# Patient Record
Sex: FEMALE | Race: WHITE | NOT HISPANIC OR LATINO | Employment: OTHER | URBAN - METROPOLITAN AREA
[De-identification: names, ages, dates, MRNs, and addresses within clinical notes are randomized per-mention and may not be internally consistent; named-entity substitution may affect disease eponyms.]

---

## 2017-03-03 ENCOUNTER — HOSPITAL ENCOUNTER (OUTPATIENT)
Dept: GENERAL RADIOLOGY | Age: 62
Discharge: HOME | End: 2017-03-03
Attending: PAIN MEDICINE

## 2017-03-03 ENCOUNTER — HOSPITAL ENCOUNTER (OUTPATIENT)
Dept: PAIN MANAGEMENT | Age: 62
Discharge: HOME | End: 2017-03-03
Attending: PAIN MEDICINE

## 2017-03-03 VITALS
OXYGEN SATURATION: 99 % | RESPIRATION RATE: 16 BRPM | SYSTOLIC BLOOD PRESSURE: 144 MMHG | HEART RATE: 67 BPM | DIASTOLIC BLOOD PRESSURE: 68 MMHG

## 2017-03-03 DIAGNOSIS — M47.812 CERVICAL SPONDYLOSIS WITHOUT MYELOPATHY: Primary | ICD-10-CM

## 2017-03-03 DIAGNOSIS — M54.2 CERVICALGIA: ICD-10-CM

## 2017-03-03 PROCEDURE — 10002805 HB CONTRAST AGENT: Performed by: PAIN MEDICINE

## 2017-03-03 PROCEDURE — 10004187 HB COUNTER-VISIT CENSUS, OUTPATIENT

## 2017-03-03 PROCEDURE — 10005281 FL PACS RECORD NR

## 2017-03-03 PROCEDURE — 10002800 HB RX 250 W HCPCS: Performed by: PAIN MEDICINE

## 2017-03-03 PROCEDURE — 10000258 HB EPIDURAL INJ 1ST LEVEL W/ IMAGING

## 2017-03-03 RX ORDER — IOPAMIDOL 408 MG/ML
3 INJECTION, SOLUTION INTRATHECAL ONCE
Status: COMPLETED | OUTPATIENT
Start: 2017-03-03 | End: 2017-03-03

## 2017-03-03 RX ORDER — DEXAMETHASONE SODIUM PHOSPHATE 10 MG/ML
20 INJECTION, SOLUTION INTRAMUSCULAR; INTRAVENOUS ONCE
Status: COMPLETED | OUTPATIENT
Start: 2017-03-03 | End: 2017-03-03

## 2017-03-03 RX ADMIN — IOPAMIDOL 3 ML: 408 INJECTION, SOLUTION INTRATHECAL at 08:50

## 2017-03-03 RX ADMIN — DEXAMETHASONE SODIUM PHOSPHATE 20 MG: 10 INJECTION, SOLUTION INTRAMUSCULAR; INTRAVENOUS at 08:50

## 2017-03-03 ASSESSMENT — PAIN SCALES - GENERAL
PAIN_LEVEL_WITH_ACTIVITY: 10
PAIN_LEVEL_AT_REST: 10

## 2017-03-27 ENCOUNTER — HOSPITAL ENCOUNTER (OUTPATIENT)
Dept: GENERAL RADIOLOGY | Age: 62
Discharge: HOME | End: 2017-03-27
Attending: PAIN MEDICINE

## 2017-03-27 ENCOUNTER — HOSPITAL ENCOUNTER (OUTPATIENT)
Dept: PAIN MANAGEMENT | Age: 62
Discharge: HOME | End: 2017-03-27
Attending: PAIN MEDICINE

## 2017-03-27 VITALS
RESPIRATION RATE: 16 BRPM | OXYGEN SATURATION: 100 % | SYSTOLIC BLOOD PRESSURE: 112 MMHG | DIASTOLIC BLOOD PRESSURE: 58 MMHG | HEART RATE: 69 BPM

## 2017-03-27 DIAGNOSIS — M47.812 CERVICAL SPONDYLOSIS WITHOUT MYELOPATHY: Primary | ICD-10-CM

## 2017-03-27 DIAGNOSIS — M54.2 CERVICALGIA: ICD-10-CM

## 2017-03-27 PROCEDURE — 10005281 FL PACS RECORD NR

## 2017-03-27 PROCEDURE — 10002800 HB RX 250 W HCPCS: Performed by: PAIN MEDICINE

## 2017-03-27 PROCEDURE — 10004187 HB COUNTER-VISIT CENSUS, OUTPATIENT

## 2017-03-27 PROCEDURE — 10000258 HB EPIDURAL INJ 1ST LEVEL W/ IMAGING

## 2017-03-27 PROCEDURE — 10002805 HB CONTRAST AGENT: Performed by: PAIN MEDICINE

## 2017-03-27 RX ORDER — DEXAMETHASONE SODIUM PHOSPHATE 10 MG/ML
20 INJECTION, SOLUTION INTRAMUSCULAR; INTRAVENOUS ONCE
Status: COMPLETED | OUTPATIENT
Start: 2017-03-27 | End: 2017-03-27

## 2017-03-27 RX ORDER — IOPAMIDOL 408 MG/ML
3 INJECTION, SOLUTION INTRATHECAL ONCE
Status: COMPLETED | OUTPATIENT
Start: 2017-03-27 | End: 2017-03-27

## 2017-03-27 RX ADMIN — DEXAMETHASONE SODIUM PHOSPHATE 20 MG: 10 INJECTION, SOLUTION INTRAMUSCULAR; INTRAVENOUS at 09:38

## 2017-03-27 RX ADMIN — IOPAMIDOL 3 ML: 408 INJECTION, SOLUTION INTRATHECAL at 09:38

## 2017-03-27 ASSESSMENT — PAIN SCALES - GENERAL
PAIN_LEVEL_AT_REST: 4
PAIN_LEVEL_WITH_ACTIVITY: 10

## 2017-04-17 ENCOUNTER — HOSPITAL ENCOUNTER (OUTPATIENT)
Dept: PAIN MANAGEMENT | Age: 62
Discharge: HOME | End: 2017-04-17
Attending: PAIN MEDICINE

## 2017-04-17 ENCOUNTER — HOSPITAL ENCOUNTER (OUTPATIENT)
Dept: GENERAL RADIOLOGY | Age: 62
Discharge: HOME | End: 2017-04-17
Attending: PAIN MEDICINE

## 2017-04-17 VITALS
SYSTOLIC BLOOD PRESSURE: 142 MMHG | DIASTOLIC BLOOD PRESSURE: 59 MMHG | OXYGEN SATURATION: 100 % | HEART RATE: 76 BPM | RESPIRATION RATE: 16 BRPM

## 2017-04-17 DIAGNOSIS — M47.812 CERVICAL SPONDYLOSIS WITHOUT MYELOPATHY: Primary | ICD-10-CM

## 2017-04-17 DIAGNOSIS — M54.12 RADICULOPATHY, CERVICAL: ICD-10-CM

## 2017-04-17 DIAGNOSIS — M54.2 CERVICALGIA: ICD-10-CM

## 2017-04-17 PROCEDURE — 10005281 FL PACS RECORD NR

## 2017-04-17 PROCEDURE — 10002805 HB CONTRAST AGENT: Performed by: PAIN MEDICINE

## 2017-04-17 PROCEDURE — 10004187 HB COUNTER-VISIT CENSUS, OUTPATIENT

## 2017-04-17 PROCEDURE — 10002800 HB RX 250 W HCPCS: Performed by: PAIN MEDICINE

## 2017-04-17 PROCEDURE — 10000258 HB EPIDURAL INJ 1ST LEVEL W/ IMAGING

## 2017-04-17 RX ORDER — DEXAMETHASONE SODIUM PHOSPHATE 10 MG/ML
20 INJECTION, SOLUTION INTRAMUSCULAR; INTRAVENOUS ONCE
Status: COMPLETED | OUTPATIENT
Start: 2017-04-17 | End: 2017-04-17

## 2017-04-17 RX ORDER — IOPAMIDOL 408 MG/ML
3 INJECTION, SOLUTION INTRATHECAL ONCE
Status: COMPLETED | OUTPATIENT
Start: 2017-04-17 | End: 2017-04-17

## 2017-04-17 RX ADMIN — DEXAMETHASONE SODIUM PHOSPHATE 20 MG: 10 INJECTION, SOLUTION INTRAMUSCULAR; INTRAVENOUS at 09:15

## 2017-04-17 RX ADMIN — IOPAMIDOL 3 ML: 408 INJECTION, SOLUTION INTRATHECAL at 09:16

## 2017-04-17 ASSESSMENT — PAIN SCALES - GENERAL
PAIN_LEVEL_WITH_ACTIVITY: 10
PAIN_LEVEL_AT_REST: 10

## 2018-04-24 ENCOUNTER — HOSPITAL ENCOUNTER (OUTPATIENT)
Dept: PAIN MANAGEMENT | Age: 63
Discharge: HOME | End: 2018-04-24
Attending: ANESTHESIOLOGY

## 2018-04-24 ENCOUNTER — HOSPITAL ENCOUNTER (OUTPATIENT)
Dept: GENERAL RADIOLOGY | Age: 63
Discharge: HOME | End: 2018-04-24
Attending: PAIN MEDICINE

## 2018-04-24 VITALS
RESPIRATION RATE: 16 BRPM | DIASTOLIC BLOOD PRESSURE: 58 MMHG | OXYGEN SATURATION: 96 % | HEART RATE: 77 BPM | SYSTOLIC BLOOD PRESSURE: 149 MMHG

## 2018-04-24 DIAGNOSIS — M54.12 RADICULOPATHY, CERVICAL: ICD-10-CM

## 2018-04-24 DIAGNOSIS — M47.812 CERVICAL SPONDYLOSIS WITHOUT MYELOPATHY: Primary | ICD-10-CM

## 2018-04-24 DIAGNOSIS — M54.2 CERVICALGIA: ICD-10-CM

## 2018-04-24 PROCEDURE — 10000258 HB EPIDURAL INJ 1ST LEVEL W/ IMAGING

## 2018-04-24 PROCEDURE — 10002805 HB CONTRAST AGENT: Performed by: PAIN MEDICINE

## 2018-04-24 PROCEDURE — 10002800 HB RX 250 W HCPCS: Performed by: PAIN MEDICINE

## 2018-04-24 PROCEDURE — 10005281 FL PACS RECORD NR

## 2018-04-24 PROCEDURE — 10004187 HB COUNTER-VISIT CENSUS, OUTPATIENT

## 2018-04-24 RX ORDER — DEXAMETHASONE SODIUM PHOSPHATE 10 MG/ML
20 INJECTION, SOLUTION INTRAMUSCULAR; INTRAVENOUS ONCE
Status: COMPLETED | OUTPATIENT
Start: 2018-04-24 | End: 2018-04-24

## 2018-04-24 RX ORDER — IOPAMIDOL 408 MG/ML
3 INJECTION, SOLUTION INTRATHECAL ONCE
Status: COMPLETED | OUTPATIENT
Start: 2018-04-24 | End: 2018-04-24

## 2018-04-24 RX ADMIN — DEXAMETHASONE SODIUM PHOSPHATE 20 MG: 10 INJECTION, SOLUTION INTRAMUSCULAR; INTRAVENOUS at 08:25

## 2018-04-24 RX ADMIN — IOPAMIDOL 3 ML: 408 INJECTION, SOLUTION INTRATHECAL at 08:25

## 2018-04-24 ASSESSMENT — PAIN SCALES - GENERAL
PAIN_LEVEL_WITH_ACTIVITY: 7
PAIN_LEVEL_AT_REST: 3

## 2018-04-27 ENCOUNTER — TELEPHONE (OUTPATIENT)
Dept: PAIN MANAGEMENT | Age: 63
End: 2018-04-27

## 2018-05-11 ENCOUNTER — HOSPITAL ENCOUNTER (OUTPATIENT)
Dept: PAIN MANAGEMENT | Age: 63
Discharge: HOME | End: 2018-05-11
Attending: ANESTHESIOLOGY

## 2018-05-11 ENCOUNTER — HOSPITAL ENCOUNTER (OUTPATIENT)
Dept: GENERAL RADIOLOGY | Age: 63
Discharge: HOME | End: 2018-05-11
Attending: PAIN MEDICINE

## 2018-05-11 VITALS
OXYGEN SATURATION: 100 % | SYSTOLIC BLOOD PRESSURE: 113 MMHG | DIASTOLIC BLOOD PRESSURE: 63 MMHG | HEART RATE: 71 BPM | RESPIRATION RATE: 16 BRPM

## 2018-05-11 DIAGNOSIS — M51.36 DDD (DEGENERATIVE DISC DISEASE), LUMBAR: Primary | ICD-10-CM

## 2018-05-11 DIAGNOSIS — M54.16 LUMBAR RADICULOPATHY: ICD-10-CM

## 2018-05-11 PROBLEM — M51.369 DDD (DEGENERATIVE DISC DISEASE), LUMBAR: Status: ACTIVE | Noted: 2018-05-11

## 2018-05-11 PROCEDURE — 10004187 HB COUNTER-VISIT CENSUS, OUTPATIENT

## 2018-05-11 PROCEDURE — 10002800 HB RX 250 W HCPCS: Performed by: PAIN MEDICINE

## 2018-05-11 PROCEDURE — 10005268 HB INJECT FORAMEN ADDL BILATERAL

## 2018-05-11 PROCEDURE — 10002805 HB CONTRAST AGENT: Performed by: PAIN MEDICINE

## 2018-05-11 PROCEDURE — 10005267 HB INJECT FORAMEN INITIAL BILATERAL

## 2018-05-11 PROCEDURE — 10005281 FL PACS RECORD NR

## 2018-05-11 RX ORDER — IOPAMIDOL 408 MG/ML
3 INJECTION, SOLUTION INTRATHECAL ONCE
Status: COMPLETED | OUTPATIENT
Start: 2018-05-11 | End: 2018-05-11

## 2018-05-11 RX ORDER — BETAMETHASONE SODIUM PHOSPHATE AND BETAMETHASONE ACETATE 3; 3 MG/ML; MG/ML
12 INJECTION, SUSPENSION INTRA-ARTICULAR; INTRALESIONAL; INTRAMUSCULAR; SOFT TISSUE ONCE
Status: COMPLETED | OUTPATIENT
Start: 2018-05-11 | End: 2018-05-11

## 2018-05-11 RX ADMIN — BETAMETHASONE SODIUM PHOSPHATE AND BETAMETHASONE ACETATE 12 MG: 3; 3 INJECTION, SUSPENSION INTRA-ARTICULAR; INTRALESIONAL; INTRAMUSCULAR at 09:08

## 2018-05-11 RX ADMIN — IOPAMIDOL 3 ML: 408 INJECTION, SOLUTION INTRATHECAL at 09:08

## 2018-05-11 ASSESSMENT — PAIN SCALES - GENERAL
PAIN_LEVEL_WITH_ACTIVITY: 10
PAIN_LEVEL_AT_REST: 3

## 2018-05-31 ENCOUNTER — HOSPITAL ENCOUNTER (OUTPATIENT)
Dept: GENERAL RADIOLOGY | Age: 63
Discharge: HOME | End: 2018-05-31
Attending: PAIN MEDICINE

## 2018-05-31 ENCOUNTER — HOSPITAL ENCOUNTER (OUTPATIENT)
Dept: PAIN MANAGEMENT | Age: 63
Discharge: HOME | End: 2018-05-31
Attending: ANESTHESIOLOGY

## 2018-05-31 VITALS
RESPIRATION RATE: 16 BRPM | SYSTOLIC BLOOD PRESSURE: 100 MMHG | HEART RATE: 75 BPM | OXYGEN SATURATION: 100 % | DIASTOLIC BLOOD PRESSURE: 59 MMHG

## 2018-05-31 DIAGNOSIS — M51.36 DDD (DEGENERATIVE DISC DISEASE), LUMBAR: Primary | ICD-10-CM

## 2018-05-31 DIAGNOSIS — M54.16 LUMBAR RADICULOPATHY: ICD-10-CM

## 2018-05-31 PROCEDURE — 10005267 HB INJECT FORAMEN INITIAL BILATERAL

## 2018-05-31 PROCEDURE — 10002805 HB CONTRAST AGENT: Performed by: PAIN MEDICINE

## 2018-05-31 PROCEDURE — 10004187 HB COUNTER-VISIT CENSUS, OUTPATIENT

## 2018-05-31 PROCEDURE — 10002800 HB RX 250 W HCPCS: Performed by: PAIN MEDICINE

## 2018-05-31 PROCEDURE — 10005268 HB INJECT FORAMEN ADDL BILATERAL

## 2018-05-31 PROCEDURE — 10005281 FL PACS RECORD NR

## 2018-05-31 RX ORDER — IOPAMIDOL 408 MG/ML
3 INJECTION, SOLUTION INTRATHECAL ONCE
Status: COMPLETED | OUTPATIENT
Start: 2018-05-31 | End: 2018-05-31

## 2018-05-31 RX ORDER — BETAMETHASONE SODIUM PHOSPHATE AND BETAMETHASONE ACETATE 3; 3 MG/ML; MG/ML
12 INJECTION, SUSPENSION INTRA-ARTICULAR; INTRALESIONAL; INTRAMUSCULAR; SOFT TISSUE ONCE
Status: COMPLETED | OUTPATIENT
Start: 2018-05-31 | End: 2018-05-31

## 2018-05-31 RX ADMIN — BETAMETHASONE SODIUM PHOSPHATE AND BETAMETHASONE ACETATE 12 MG: 3; 3 INJECTION, SUSPENSION INTRA-ARTICULAR; INTRALESIONAL; INTRAMUSCULAR at 11:42

## 2018-05-31 RX ADMIN — IOPAMIDOL 3 ML: 408 INJECTION, SOLUTION INTRATHECAL at 11:42

## 2018-05-31 ASSESSMENT — PAIN SCALES - GENERAL
PAIN_LEVEL_AT_REST: 3
PAIN_LEVEL_WITH_ACTIVITY: 10

## 2018-12-03 ENCOUNTER — HOSPITAL ENCOUNTER (OUTPATIENT)
Dept: MAMMOGRAPHY | Age: 63
Discharge: HOME | End: 2018-12-03
Attending: FAMILY MEDICINE

## 2018-12-03 DIAGNOSIS — Z12.39 SCREENING BREAST EXAMINATION: ICD-10-CM

## 2018-12-03 PROCEDURE — 77063 BREAST TOMOSYNTHESIS BI: CPT | Performed by: RADIOLOGY

## 2018-12-03 PROCEDURE — 3342F MAMMO ASSESS BENGN DOCD: CPT | Performed by: RADIOLOGY

## 2018-12-03 PROCEDURE — 77063 BREAST TOMOSYNTHESIS BI: CPT

## 2018-12-03 PROCEDURE — 77067 SCR MAMMO BI INCL CAD: CPT | Performed by: RADIOLOGY

## 2019-02-28 DIAGNOSIS — M25.551 RIGHT HIP PAIN: Primary | ICD-10-CM

## 2019-03-05 ENCOUNTER — OFFICE VISIT (OUTPATIENT)
Dept: ORTHOPEDICS | Age: 64
End: 2019-03-05

## 2019-03-05 ENCOUNTER — IMAGING SERVICES (OUTPATIENT)
Dept: GENERAL RADIOLOGY | Age: 64
End: 2019-03-05
Attending: ORTHOPAEDIC SURGERY

## 2019-03-05 DIAGNOSIS — M16.11 PRIMARY OSTEOARTHRITIS OF RIGHT HIP: ICD-10-CM

## 2019-03-05 DIAGNOSIS — M16.11 PRIMARY OSTEOARTHRITIS OF RIGHT HIP: Primary | ICD-10-CM

## 2019-03-05 DIAGNOSIS — M25.551 RIGHT HIP PAIN: ICD-10-CM

## 2019-03-05 PROCEDURE — 99214 OFFICE O/P EST MOD 30 MIN: CPT | Performed by: ORTHOPAEDIC SURGERY

## 2019-03-05 PROCEDURE — 73501 X-RAY EXAM HIP UNI 1 VIEW: CPT | Performed by: RADIOLOGY

## 2019-03-06 ENCOUNTER — PREP FOR CASE (OUTPATIENT)
Dept: ORTHOPEDICS | Age: 64
End: 2019-03-06

## 2019-03-06 DIAGNOSIS — M16.11 PRIMARY OSTEOARTHRITIS OF RIGHT HIP: Primary | ICD-10-CM

## 2019-03-14 ENCOUNTER — OFFICE VISIT (OUTPATIENT)
Dept: FAMILY MEDICINE | Age: 64
End: 2019-03-14
Attending: ORTHOPAEDIC SURGERY

## 2019-03-14 ENCOUNTER — LAB SERVICES (OUTPATIENT)
Dept: LAB | Age: 64
End: 2019-03-14

## 2019-03-14 VITALS
DIASTOLIC BLOOD PRESSURE: 52 MMHG | HEART RATE: 74 BPM | SYSTOLIC BLOOD PRESSURE: 98 MMHG | HEIGHT: 64 IN | BODY MASS INDEX: 23.49 KG/M2 | OXYGEN SATURATION: 98 % | WEIGHT: 137.6 LBS

## 2019-03-14 DIAGNOSIS — F51.01 PRIMARY INSOMNIA: ICD-10-CM

## 2019-03-14 DIAGNOSIS — Z13.220 LIPID SCREENING: ICD-10-CM

## 2019-03-14 DIAGNOSIS — Z11.59 NEED FOR HEPATITIS C SCREENING TEST: ICD-10-CM

## 2019-03-14 DIAGNOSIS — R25.1 TREMOR: ICD-10-CM

## 2019-03-14 DIAGNOSIS — I10 ESSENTIAL HYPERTENSION: ICD-10-CM

## 2019-03-14 DIAGNOSIS — E03.8 OTHER SPECIFIED HYPOTHYROIDISM: ICD-10-CM

## 2019-03-14 DIAGNOSIS — Z76.89 ENCOUNTER TO ESTABLISH CARE: ICD-10-CM

## 2019-03-14 DIAGNOSIS — F41.9 ANXIETY AND DEPRESSION: ICD-10-CM

## 2019-03-14 DIAGNOSIS — F32.A ANXIETY AND DEPRESSION: ICD-10-CM

## 2019-03-14 DIAGNOSIS — G57.93 NEUROPATHY INVOLVING BOTH LOWER EXTREMITIES: ICD-10-CM

## 2019-03-14 DIAGNOSIS — F31.73 BIPOLAR DISORDER, IN PARTIAL REMISSION, MOST RECENT EPISODE MANIC (CMD): ICD-10-CM

## 2019-03-14 DIAGNOSIS — Z86.2 HISTORY OF IRON DEFICIENCY ANEMIA: ICD-10-CM

## 2019-03-14 DIAGNOSIS — Z01.812 PRE-PROCEDURAL LABORATORY EXAMINATION: ICD-10-CM

## 2019-03-14 DIAGNOSIS — Z72.820 SLEEP DEPRIVATION: Primary | ICD-10-CM

## 2019-03-14 PROBLEM — M79.89 LEG SWELLING: Status: ACTIVE | Noted: 2019-03-14

## 2019-03-14 PROBLEM — G47.39 OTHER SLEEP APNEA: Status: ACTIVE | Noted: 2019-03-14

## 2019-03-14 PROCEDURE — 36415 COLL VENOUS BLD VENIPUNCTURE: CPT | Performed by: INTERNAL MEDICINE

## 2019-03-14 PROCEDURE — 99204 OFFICE O/P NEW MOD 45 MIN: CPT | Performed by: FAMILY MEDICINE

## 2019-03-14 RX ORDER — PREGABALIN 100 MG/1
100 CAPSULE ORAL 2 TIMES DAILY
COMMUNITY
End: 2020-05-19 | Stop reason: SDUPTHER

## 2019-03-14 RX ORDER — SERTRALINE HYDROCHLORIDE 100 MG/1
100 TABLET, FILM COATED ORAL DAILY
COMMUNITY
End: 2020-05-19 | Stop reason: SDUPTHER

## 2019-03-14 ASSESSMENT — PATIENT HEALTH QUESTIONNAIRE - PHQ9
SUM OF ALL RESPONSES TO PHQ9 QUESTIONS 1 AND 2: 2
2. FEELING DOWN, DEPRESSED OR HOPELESS: SEVERAL DAYS
SUM OF ALL RESPONSES TO PHQ9 QUESTIONS 1 AND 2: 2
1. LITTLE INTEREST OR PLEASURE IN DOING THINGS: SEVERAL DAYS

## 2019-03-15 ENCOUNTER — TELEPHONE (OUTPATIENT)
Dept: FAMILY MEDICINE | Age: 64
End: 2019-03-15

## 2019-03-15 ENCOUNTER — TELEPHONE (OUTPATIENT)
Dept: ORTHOPEDICS | Age: 64
End: 2019-03-15

## 2019-03-15 DIAGNOSIS — D53.9 MACROCYTIC ANEMIA: Primary | ICD-10-CM

## 2019-03-15 LAB
ALBUMIN SERPL-MCNC: 3.8 G/DL (ref 3.6–5.1)
ALBUMIN/GLOB SERPL: 1.4 {RATIO} (ref 1–2.4)
ALP SERPL-CCNC: 94 UNITS/L (ref 45–117)
ALT SERPL-CCNC: 16 UNITS/L
ANION GAP SERPL CALC-SCNC: 10 MMOL/L (ref 10–20)
APPEARANCE UR: CLEAR
AST SERPL-CCNC: 12 UNITS/L
BASOPHILS # BLD AUTO: 0 K/MCL (ref 0–0.3)
BASOPHILS NFR BLD AUTO: 1 %
BILIRUB SERPL-MCNC: 0.3 MG/DL (ref 0.2–1)
BILIRUB UR QL STRIP: NEGATIVE
BUN SERPL-MCNC: 27 MG/DL (ref 6–20)
BUN/CREAT SERPL: 25 (ref 7–25)
CALCIUM SERPL-MCNC: 8.5 MG/DL (ref 8.4–10.2)
CHLORIDE SERPL-SCNC: 109 MMOL/L (ref 98–107)
CHOLEST SERPL-MCNC: 199 MG/DL
CHOLEST/HDLC SERPL: 3 {RATIO}
CO2 SERPL-SCNC: 27 MMOL/L (ref 21–32)
COLOR UR: YELLOW
CREAT SERPL-MCNC: 1.08 MG/DL (ref 0.51–0.95)
DIFFERENTIAL METHOD BLD: ABNORMAL
EOSINOPHIL # BLD AUTO: 0.1 K/MCL (ref 0.1–0.5)
EOSINOPHIL NFR SPEC: 1 %
ERYTHROCYTE [DISTWIDTH] IN BLOOD: 12.6 % (ref 11–15)
FASTING STATUS PATIENT QL REPORTED: 1 HRS
GLOBULIN SER-MCNC: 2.8 G/DL (ref 2–4)
GLUCOSE SERPL-MCNC: 82 MG/DL (ref 65–99)
GLUCOSE UR STRIP-MCNC: NEGATIVE MG/DL
HCT VFR BLD CALC: 38.4 % (ref 36–46.5)
HCV AB SER QL: NEGATIVE
HDLC SERPL-MCNC: 67 MG/DL
HGB BLD-MCNC: 11.6 G/DL (ref 12–15.5)
HGB UR QL STRIP: NEGATIVE
IMM GRANULOCYTES # BLD AUTO: 0 K/MCL (ref 0–0.2)
IMM GRANULOCYTES NFR BLD: 0 %
KETONES UR STRIP-MCNC: NEGATIVE MG/DL
LDLC SERPL-MCNC: 100 MG/DL
LENGTH OF FAST TIME PATIENT: 1 HRS
LEUKOCYTE ESTERASE UR QL STRIP: NEGATIVE
LYMPHOCYTES # BLD MANUAL: 2.1 K/MCL (ref 1–4)
LYMPHOCYTES NFR BLD MANUAL: 50 %
MCH RBC QN AUTO: 32.9 PG (ref 26–34)
MCHC RBC AUTO-ENTMCNC: 30.2 G/DL (ref 32–36.5)
MCV RBC AUTO: 108.8 FL (ref 78–100)
MONOCYTES # BLD MANUAL: 0.3 K/MCL (ref 0.3–0.9)
MONOCYTES NFR BLD MANUAL: 8 %
NEUTROPHILS # BLD: 1.7 K/MCL (ref 1.8–7.7)
NEUTROPHILS NFR BLD AUTO: 40 %
NITRITE UR QL STRIP: NEGATIVE
NONHDLC SERPL-MCNC: 132 MG/DL
NRBC BLD MANUAL-RTO: 0 /100 WBC
PH UR STRIP: 6 UNITS (ref 5–7)
PLATELET # BLD: 243 K/MCL (ref 140–450)
POTASSIUM SERPL-SCNC: 3.8 MMOL/L (ref 3.4–5.1)
PROT SERPL-MCNC: 6.6 G/DL (ref 6.4–8.2)
PROT UR STRIP-MCNC: NEGATIVE MG/DL
RBC # BLD: 3.53 MIL/MCL (ref 4–5.2)
SODIUM SERPL-SCNC: 142 MMOL/L (ref 135–145)
SP GR UR STRIP: 1.01 (ref 1–1.03)
SPECIMEN SOURCE: NORMAL
TRIGL SERPL-MCNC: 162 MG/DL
TSH SERPL-ACNC: 0.98 MCUNITS/ML (ref 0.35–5)
UROBILINOGEN UR STRIP-MCNC: 0.2 MG/DL (ref 0–1)
WBC # BLD: 4.2 K/MCL (ref 4.2–11)

## 2019-03-18 ENCOUNTER — OFFICE VISIT (OUTPATIENT)
Dept: FAMILY MEDICINE | Age: 64
End: 2019-03-18
Attending: ORTHOPAEDIC SURGERY

## 2019-03-18 ENCOUNTER — TELEPHONE (OUTPATIENT)
Dept: TELEHEALTH | Age: 64
End: 2019-03-18

## 2019-03-18 ENCOUNTER — LAB SERVICES (OUTPATIENT)
Dept: LAB | Age: 64
End: 2019-03-18

## 2019-03-18 ENCOUNTER — IMAGING SERVICES (OUTPATIENT)
Dept: GENERAL RADIOLOGY | Age: 64
End: 2019-03-18

## 2019-03-18 VITALS
SYSTOLIC BLOOD PRESSURE: 108 MMHG | BODY MASS INDEX: 23.22 KG/M2 | TEMPERATURE: 98.4 F | OXYGEN SATURATION: 99 % | HEIGHT: 64 IN | DIASTOLIC BLOOD PRESSURE: 58 MMHG | WEIGHT: 136 LBS | HEART RATE: 67 BPM

## 2019-03-18 DIAGNOSIS — Z72.820 SLEEP DEPRIVATION: ICD-10-CM

## 2019-03-18 DIAGNOSIS — D53.9 MACROCYTIC ANEMIA: ICD-10-CM

## 2019-03-18 DIAGNOSIS — Z01.818 PREOP EXAMINATION: Primary | ICD-10-CM

## 2019-03-18 DIAGNOSIS — Z01.818 PREOP EXAMINATION: ICD-10-CM

## 2019-03-18 DIAGNOSIS — I10 ESSENTIAL HYPERTENSION: ICD-10-CM

## 2019-03-18 DIAGNOSIS — M16.11 PRIMARY OSTEOARTHRITIS OF RIGHT HIP: ICD-10-CM

## 2019-03-18 DIAGNOSIS — M79.89 LEG SWELLING: ICD-10-CM

## 2019-03-18 PROBLEM — N18.30 CKD (CHRONIC KIDNEY DISEASE) STAGE 3, GFR 30-59 ML/MIN  (CMD): Status: ACTIVE | Noted: 2019-03-18

## 2019-03-18 LAB
FERRITIN SERPL-MCNC: 129 NG/ML (ref 8–252)
FOLATE SERPL-MCNC: >24 NG/ML
IRON SATN MFR SERPL: 55 % (ref 15–45)
IRON SERPL-MCNC: 148 MCG/DL (ref 50–170)
TIBC SERPL-MCNC: 267 MCG/DL (ref 250–450)
VIT B12 SERPL-MCNC: 422 PG/ML (ref 211–911)

## 2019-03-18 PROCEDURE — 36415 COLL VENOUS BLD VENIPUNCTURE: CPT | Performed by: INTERNAL MEDICINE

## 2019-03-18 PROCEDURE — 99215 OFFICE O/P EST HI 40 MIN: CPT | Performed by: FAMILY MEDICINE

## 2019-03-18 PROCEDURE — 71046 X-RAY EXAM CHEST 2 VIEWS: CPT | Performed by: RADIOLOGY

## 2019-03-18 PROCEDURE — 93000 ELECTROCARDIOGRAM COMPLETE: CPT | Performed by: FAMILY MEDICINE

## 2019-03-18 RX ORDER — MODAFINIL 200 MG/1
200 TABLET ORAL 2 TIMES DAILY
Qty: 60 TABLET | Refills: 0 | Status: SHIPPED | OUTPATIENT
Start: 2019-03-18 | End: 2020-05-19 | Stop reason: SDUPTHER

## 2019-03-18 RX ORDER — FUROSEMIDE 80 MG
80 TABLET ORAL DAILY
Qty: 30 TABLET | Refills: 0 | Status: SHIPPED | COMMUNITY
Start: 2019-03-18 | End: 2020-05-19 | Stop reason: SDUPTHER

## 2019-03-18 RX ORDER — MODAFINIL 200 MG/1
600 TABLET ORAL SEE ADMIN INSTRUCTIONS
Qty: 90 TABLET | Refills: 0 | Status: SHIPPED | COMMUNITY
Start: 2019-03-18 | End: 2019-03-18 | Stop reason: SDUPTHER

## 2019-03-18 RX ORDER — QUINAPRIL 40 MG/1
40 TABLET ORAL DAILY
Qty: 30 TABLET | Refills: 0 | Status: SHIPPED | COMMUNITY
Start: 2019-03-18 | End: 2020-05-19 | Stop reason: SDUPTHER

## 2019-03-19 ENCOUNTER — TELEPHONE (OUTPATIENT)
Dept: FAMILY MEDICINE | Age: 64
End: 2019-03-19

## 2019-03-19 DIAGNOSIS — Z86.39 HISTORY OF HYPOKALEMIA: Primary | ICD-10-CM

## 2019-03-19 LAB
MRSA DNA SPEC QL NAA+PROBE: NOT DETECTED
S AUREUS DNA SPEC QL NAA+PROBE: NOT DETECTED
SPECIMEN SOURCE: NORMAL

## 2019-03-20 ENCOUNTER — TELEPHONE (OUTPATIENT)
Dept: FAMILY MEDICINE | Age: 64
End: 2019-03-20

## 2019-03-20 RX ORDER — POTASSIUM CHLORIDE 1500 MG/1
20 TABLET, EXTENDED RELEASE ORAL DAILY
Qty: 30 TABLET | Refills: 0 | Status: SHIPPED | COMMUNITY
Start: 2019-03-20 | End: 2020-05-19 | Stop reason: SDUPTHER

## 2019-04-12 ENCOUNTER — APPOINTMENT (OUTPATIENT)
Dept: FAMILY MEDICINE | Age: 64
End: 2019-04-12

## 2019-04-15 ENCOUNTER — OFFICE VISIT (OUTPATIENT)
Dept: ORTHOPEDICS | Age: 64
End: 2019-04-15

## 2019-04-15 ENCOUNTER — OFFICE VISIT (OUTPATIENT)
Dept: FAMILY MEDICINE | Age: 64
End: 2019-04-15

## 2019-04-15 VITALS
TEMPERATURE: 99.2 F | RESPIRATION RATE: 16 BRPM | HEIGHT: 64 IN | HEART RATE: 81 BPM | OXYGEN SATURATION: 98 % | BODY MASS INDEX: 23.23 KG/M2 | WEIGHT: 136.1 LBS | SYSTOLIC BLOOD PRESSURE: 98 MMHG | DIASTOLIC BLOOD PRESSURE: 60 MMHG

## 2019-04-15 DIAGNOSIS — M16.11 PRIMARY OSTEOARTHRITIS OF RIGHT HIP: Primary | ICD-10-CM

## 2019-04-15 DIAGNOSIS — G47.39 OTHER SLEEP APNEA: ICD-10-CM

## 2019-04-15 DIAGNOSIS — I10 ESSENTIAL HYPERTENSION: Primary | ICD-10-CM

## 2019-04-15 DIAGNOSIS — M16.10 HIP ARTHRITIS: ICD-10-CM

## 2019-04-15 PROCEDURE — 99214 OFFICE O/P EST MOD 30 MIN: CPT | Performed by: FAMILY MEDICINE

## 2019-04-15 PROCEDURE — 99213 OFFICE O/P EST LOW 20 MIN: CPT | Performed by: PHYSICIAN ASSISTANT

## 2019-04-15 RX ORDER — VITAMIN E 268 MG
400 CAPSULE ORAL DAILY
COMMUNITY

## 2019-04-15 RX ORDER — IBUPROFEN 800 MG/1
2400 TABLET ORAL EVERY 6 HOURS PRN
Status: ON HOLD | COMMUNITY
End: 2019-04-20 | Stop reason: HOSPADM

## 2019-04-15 ASSESSMENT — COGNITIVE AND FUNCTIONAL STATUS - GENERAL
ARE YOU DEAF OR DO YOU HAVE SERIOUS DIFFICULTY  HEARING: NO
ARE YOU BLIND OR DO YOU HAVE SERIOUS DIFFICULTY SEEING, EVEN WHEN WEARING GLASSES: NO

## 2019-04-15 ASSESSMENT — ACTIVITIES OF DAILY LIVING (ADL)
MOBILITY_ASSIST_DEVICES: CANE;FRONT-WHEELED WALKER
SENSORY_SUPPORT_DEVICES: DENTURES
ADL_SHORT_OF_BREATH: NO
ADL_SCORE: 12
RECENT_DECLINE_ADL: NO
HISTORY OF FALLING IN THE LAST YEAR (PRIOR TO ADMISSION): NO
CHRONIC_PAIN_PRESENT: YES, CHRONIC
ADL_BEFORE_ADMISSION: INDEPENDENT
NEEDS_ASSIST: NO

## 2019-04-16 ENCOUNTER — HOSPITAL ENCOUNTER (OUTPATIENT)
Dept: REHABILITATION | Age: 64
Discharge: STILL ADMITTED OR ADMINISTRATIVE | End: 2019-04-16
Attending: ORTHOPAEDIC SURGERY

## 2019-04-16 ENCOUNTER — HOSPITAL ENCOUNTER (OUTPATIENT)
Dept: ORTHOPEDICS | Age: 64
Discharge: HOME | DRG: 470 | End: 2019-04-16

## 2019-04-16 ENCOUNTER — ANESTHESIA EVENT (OUTPATIENT)
Dept: SURGERY | Age: 64
DRG: 470 | End: 2019-04-16

## 2019-04-16 DIAGNOSIS — M16.11 PRIMARY OSTEOARTHRITIS OF RIGHT HIP: ICD-10-CM

## 2019-04-16 PROCEDURE — 97530 THERAPEUTIC ACTIVITIES: CPT | Performed by: PHYSICAL THERAPIST

## 2019-04-16 PROCEDURE — 10004173 HB COUNTER-THERAPY VISIT PT: Performed by: PHYSICAL THERAPIST

## 2019-04-16 PROCEDURE — 97110 THERAPEUTIC EXERCISES: CPT | Performed by: PHYSICAL THERAPIST

## 2019-04-16 PROCEDURE — 10004172 HB COUNTER-THERAPY VISIT OT: Performed by: OCCUPATIONAL THERAPIST

## 2019-04-16 PROCEDURE — 97530 THERAPEUTIC ACTIVITIES: CPT | Performed by: OCCUPATIONAL THERAPIST

## 2019-04-16 PROCEDURE — 97110 THERAPEUTIC EXERCISES: CPT | Performed by: OCCUPATIONAL THERAPIST

## 2019-04-16 PROCEDURE — 97165 OT EVAL LOW COMPLEX 30 MIN: CPT | Performed by: PHYSICAL THERAPIST

## 2019-04-16 PROCEDURE — 97165 OT EVAL LOW COMPLEX 30 MIN: CPT | Performed by: OCCUPATIONAL THERAPIST

## 2019-04-16 PROCEDURE — 97116 GAIT TRAINING THERAPY: CPT | Performed by: PHYSICAL THERAPIST

## 2019-04-16 PROCEDURE — 97161 PT EVAL LOW COMPLEX 20 MIN: CPT | Performed by: PHYSICAL THERAPIST

## 2019-04-17 ENCOUNTER — APPOINTMENT (OUTPATIENT)
Dept: GENERAL RADIOLOGY | Age: 64
DRG: 470 | End: 2019-04-17
Attending: ORTHOPAEDIC SURGERY

## 2019-04-17 ENCOUNTER — ANESTHESIA (OUTPATIENT)
Dept: SURGERY | Age: 64
DRG: 470 | End: 2019-04-17

## 2019-04-17 ENCOUNTER — HOSPITAL ENCOUNTER (INPATIENT)
Age: 64
LOS: 3 days | Discharge: NURSING, REHAB OR LONG-TERM CARE | DRG: 470 | End: 2019-04-20
Attending: ORTHOPAEDIC SURGERY | Admitting: ORTHOPAEDIC SURGERY

## 2019-04-17 DIAGNOSIS — M16.11 OSTEOARTHRITIS OF RIGHT HIP, UNSPECIFIED OSTEOARTHRITIS TYPE: ICD-10-CM

## 2019-04-17 DIAGNOSIS — Z51.89 ENCOUNTER FOR THERAPEUTIC PROCEDURE: Primary | ICD-10-CM

## 2019-04-17 LAB
ABO + RH BLD: NORMAL
ANION GAP SERPL CALC-SCNC: 11 MMOL/L (ref 10–20)
BLD GP AB SCN SERPL QL: NEGATIVE
BLOOD BANK CMNT PATIENT-IMP: NORMAL
BUN SERPL-MCNC: 24 MG/DL (ref 6–20)
BUN/CREAT SERPL: 17 (ref 7–25)
CALCIUM SERPL-MCNC: 8.9 MG/DL (ref 8.4–10.2)
CHLORIDE SERPL-SCNC: 105 MMOL/L (ref 98–107)
CO2 SERPL-SCNC: 26 MMOL/L (ref 21–32)
CREAT SERPL-MCNC: 1.38 MG/DL (ref 0.51–0.95)
CROSSMATCH EXPIRE: NORMAL
ERYTHROCYTE [DISTWIDTH] IN BLOOD: 12.9 % (ref 11–15)
GLUCOSE SERPL-MCNC: 67 MG/DL (ref 65–99)
HCT VFR BLD CALC: 36.8 % (ref 36–46.5)
HGB BLD-MCNC: 11.4 G/DL (ref 12–15.5)
MCH RBC QN AUTO: 32.8 PG (ref 26–34)
MCHC RBC AUTO-ENTMCNC: 31 G/DL (ref 32–36.5)
MCV RBC AUTO: 105.7 FL (ref 78–100)
NRBC BLD MANUAL-RTO: 0 /100 WBC
PLATELET # BLD: 238 K/MCL (ref 140–450)
POTASSIUM SERPL-SCNC: 4.2 MMOL/L (ref 3.4–5.1)
RBC # BLD: 3.48 MIL/MCL (ref 4–5.2)
SODIUM SERPL-SCNC: 138 MMOL/L (ref 135–145)
WBC # BLD: 4.8 K/MCL (ref 4.2–11)

## 2019-04-17 PROCEDURE — 10002803 HB RX 637: Performed by: ANESTHESIOLOGY

## 2019-04-17 PROCEDURE — 10004316 HB COUNTER-PREP

## 2019-04-17 PROCEDURE — 10002803 HB RX 637: Performed by: ORTHOPAEDIC SURGERY

## 2019-04-17 PROCEDURE — C1776 JOINT DEVICE (IMPLANTABLE): HCPCS | Performed by: ORTHOPAEDIC SURGERY

## 2019-04-17 PROCEDURE — 10004173 HB COUNTER-THERAPY VISIT PT

## 2019-04-17 PROCEDURE — 10002117 HB ADDITIONAL SURGERY TIME/30 MIN: Performed by: ORTHOPAEDIC SURGERY

## 2019-04-17 PROCEDURE — 10002359 HB ANESTH GENERAL ADD'L 1/2 HR: Performed by: ORTHOPAEDIC SURGERY

## 2019-04-17 PROCEDURE — 10002807 HB RX 258: Performed by: ANESTHESIOLOGY

## 2019-04-17 PROCEDURE — 10002801 HB RX 250 W/O HCPCS: Performed by: ANESTHESIOLOGY

## 2019-04-17 PROCEDURE — 97530 THERAPEUTIC ACTIVITIES: CPT

## 2019-04-17 PROCEDURE — 10002800 HB RX 250 W HCPCS: Performed by: ORTHOPAEDIC SURGERY

## 2019-04-17 PROCEDURE — 10000002 HB ROOM CHARGE MED SURG

## 2019-04-17 PROCEDURE — 10002800 HB RX 250 W HCPCS: Performed by: ANESTHESIOLOGY

## 2019-04-17 PROCEDURE — 10004451 HB PACU RECOVERY 1ST 30 MINUTES: Performed by: ORTHOPAEDIC SURGERY

## 2019-04-17 PROCEDURE — 85027 COMPLETE CBC AUTOMATED: CPT

## 2019-04-17 PROCEDURE — 10002803 HB RX 637: Performed by: PHYSICIAN ASSISTANT

## 2019-04-17 PROCEDURE — 10004651 HB RX, NO CHARGE ITEM: Performed by: PHYSICIAN ASSISTANT

## 2019-04-17 PROCEDURE — 10002800 HB RX 250 W HCPCS: Performed by: PHYSICIAN ASSISTANT

## 2019-04-17 PROCEDURE — 10002801 HB RX 250 W/O HCPCS: Performed by: ORTHOPAEDIC SURGERY

## 2019-04-17 PROCEDURE — 97161 PT EVAL LOW COMPLEX 20 MIN: CPT

## 2019-04-17 PROCEDURE — 27130 TOTAL HIP ARTHROPLASTY: CPT | Performed by: ORTHOPAEDIC SURGERY

## 2019-04-17 PROCEDURE — 10004452 HB PACU ADDL 30 MINUTES: Performed by: ORTHOPAEDIC SURGERY

## 2019-04-17 PROCEDURE — 27130 TOTAL HIP ARTHROPLASTY: CPT | Performed by: PHYSICIAN ASSISTANT

## 2019-04-17 PROCEDURE — 10004571 HB COUNTER-HOLDING 30 MIN: Performed by: ANESTHESIOLOGY

## 2019-04-17 PROCEDURE — 86900 BLOOD TYPING SEROLOGIC ABO: CPT

## 2019-04-17 PROCEDURE — 27130 TOTAL HIP ARTHROPLASTY: CPT | Performed by: ANESTHESIOLOGY

## 2019-04-17 PROCEDURE — 10003056 HB DISPOSABLE INSTRUMENT/SUPPLY 1: Performed by: ORTHOPAEDIC SURGERY

## 2019-04-17 PROCEDURE — 97116 GAIT TRAINING THERAPY: CPT

## 2019-04-17 PROCEDURE — 10005281 FL PACS RECORD REPORTABLE

## 2019-04-17 PROCEDURE — 0SR90JA REPLACEMENT OF RIGHT HIP JOINT WITH SYNTHETIC SUBSTITUTE, UNCEMENTED, OPEN APPROACH: ICD-10-PCS | Performed by: ORTHOPAEDIC SURGERY

## 2019-04-17 PROCEDURE — 10002222 HB HIP PROCEDURES 2: Performed by: ORTHOPAEDIC SURGERY

## 2019-04-17 PROCEDURE — 80048 BASIC METABOLIC PNL TOTAL CA: CPT

## 2019-04-17 PROCEDURE — 10002807 HB RX 258: Performed by: PHYSICIAN ASSISTANT

## 2019-04-17 PROCEDURE — 10002358 HB ANESTH GENERAL 1ST 1/2 HR: Performed by: ORTHOPAEDIC SURGERY

## 2019-04-17 DEVICE — PINNACLE HIP SOLUTIONS ALTRX POLYETHYLENE ACETABULAR LINER NEUTRAL 36MM ID 54MM OD
Type: IMPLANTABLE DEVICE | Site: HIP | Status: FUNCTIONAL
Brand: PINNACLE ALTRX

## 2019-04-17 DEVICE — MMIS - STEM FEM 105MM HIP 5 12/14 STD OFFSET TPR COLLAR: Type: IMPLANTABLE DEVICE | Site: HIP | Status: FUNCTIONAL

## 2019-04-17 DEVICE — BIOLOX DELTA CERAMIC FEMORAL HEAD +8.5 36MM DIA 12/14 TAPER
Type: IMPLANTABLE DEVICE | Site: HIP | Status: FUNCTIONAL
Brand: BIOLOX DELTA

## 2019-04-17 DEVICE — PINNACLE GRIPTION ACETABULAR SHELL SECTOR 54MM OD
Type: IMPLANTABLE DEVICE | Site: HIP | Status: FUNCTIONAL
Brand: PINNACLE GRIPTION

## 2019-04-17 RX ORDER — ONDANSETRON 2 MG/ML
4 INJECTION INTRAMUSCULAR; INTRAVENOUS 2 TIMES DAILY PRN
Status: DISCONTINUED | OUTPATIENT
Start: 2019-04-17 | End: 2019-04-17 | Stop reason: HOSPADM

## 2019-04-17 RX ORDER — TRAZODONE HYDROCHLORIDE 100 MG/1
200 TABLET ORAL NIGHTLY
Status: DISCONTINUED | OUTPATIENT
Start: 2019-04-17 | End: 2019-04-20 | Stop reason: HOSPADM

## 2019-04-17 RX ORDER — MIDAZOLAM HYDROCHLORIDE 1 MG/ML
2 INJECTION, SOLUTION INTRAMUSCULAR; INTRAVENOUS
Status: COMPLETED | OUTPATIENT
Start: 2019-04-17 | End: 2019-04-17

## 2019-04-17 RX ORDER — ALBUTEROL SULFATE 2.5 MG/3ML
2.5 SOLUTION RESPIRATORY (INHALATION)
Status: DISCONTINUED | OUTPATIENT
Start: 2019-04-17 | End: 2019-04-17 | Stop reason: HOSPADM

## 2019-04-17 RX ORDER — SODIUM CHLORIDE, SODIUM LACTATE, POTASSIUM CHLORIDE, CALCIUM CHLORIDE 600; 310; 30; 20 MG/100ML; MG/100ML; MG/100ML; MG/100ML
INJECTION, SOLUTION INTRAVENOUS CONTINUOUS
Status: DISCONTINUED | OUTPATIENT
Start: 2019-04-17 | End: 2019-04-17 | Stop reason: ALTCHOICE

## 2019-04-17 RX ORDER — LIDOCAINE AND PRILOCAINE 25; 25 MG/G; MG/G
1 CREAM TOPICAL PRN
Status: DISCONTINUED | OUTPATIENT
Start: 2019-04-17 | End: 2019-04-17 | Stop reason: HOSPADM

## 2019-04-17 RX ORDER — CEFAZOLIN SODIUM/WATER 2 G/20 ML
2000 SYRINGE (ML) INTRAVENOUS EVERY 8 HOURS SCHEDULED
Status: DISPENSED | OUTPATIENT
Start: 2019-04-17 | End: 2019-04-18

## 2019-04-17 RX ORDER — DIPHENHYDRAMINE HYDROCHLORIDE 50 MG/ML
25 INJECTION INTRAMUSCULAR; INTRAVENOUS
Status: DISCONTINUED | OUTPATIENT
Start: 2019-04-17 | End: 2019-04-17 | Stop reason: HOSPADM

## 2019-04-17 RX ORDER — TOPIRAMATE 100 MG/1
200 TABLET, FILM COATED ORAL EVERY 12 HOURS SCHEDULED
Status: DISCONTINUED | OUTPATIENT
Start: 2019-04-17 | End: 2019-04-17

## 2019-04-17 RX ORDER — OXYCODONE HYDROCHLORIDE 5 MG/1
5-15 TABLET ORAL
Status: DISCONTINUED | OUTPATIENT
Start: 2019-04-17 | End: 2019-04-20 | Stop reason: HOSPADM

## 2019-04-17 RX ORDER — FAMOTIDINE 20 MG/1
20 TABLET, FILM COATED ORAL
Status: COMPLETED | OUTPATIENT
Start: 2019-04-17 | End: 2019-04-17

## 2019-04-17 RX ORDER — DEXAMETHASONE SODIUM PHOSPHATE 4 MG/ML
4 INJECTION, SOLUTION INTRA-ARTICULAR; INTRALESIONAL; INTRAMUSCULAR; INTRAVENOUS; SOFT TISSUE
Status: DISCONTINUED | OUTPATIENT
Start: 2019-04-17 | End: 2019-04-17 | Stop reason: HOSPADM

## 2019-04-17 RX ORDER — BUPIVACAINE HYDROCHLORIDE AND EPINEPHRINE 2.5; 5 MG/ML; UG/ML
INJECTION, SOLUTION EPIDURAL; INFILTRATION; INTRACAUDAL; PERINEURAL PRN
Status: DISCONTINUED | OUTPATIENT
Start: 2019-04-17 | End: 2019-04-17 | Stop reason: HOSPADM

## 2019-04-17 RX ORDER — HYDRALAZINE HYDROCHLORIDE 20 MG/ML
5 INJECTION INTRAMUSCULAR; INTRAVENOUS EVERY 10 MIN PRN
Status: DISCONTINUED | OUTPATIENT
Start: 2019-04-17 | End: 2019-04-17 | Stop reason: HOSPADM

## 2019-04-17 RX ORDER — SODIUM CHLORIDE, SODIUM LACTATE, POTASSIUM CHLORIDE, CALCIUM CHLORIDE 600; 310; 30; 20 MG/100ML; MG/100ML; MG/100ML; MG/100ML
INJECTION, SOLUTION INTRAVENOUS CONTINUOUS PRN
Status: DISCONTINUED | OUTPATIENT
Start: 2019-04-17 | End: 2019-04-17

## 2019-04-17 RX ORDER — TOPIRAMATE 100 MG/1
400 TABLET, FILM COATED ORAL NIGHTLY
Status: DISCONTINUED | OUTPATIENT
Start: 2019-04-17 | End: 2019-04-20 | Stop reason: HOSPADM

## 2019-04-17 RX ORDER — NICOTINE POLACRILEX 4 MG
30 LOZENGE BUCCAL
Status: DISCONTINUED | OUTPATIENT
Start: 2019-04-17 | End: 2019-04-17 | Stop reason: HOSPADM

## 2019-04-17 RX ORDER — ACETAMINOPHEN 500 MG
1000 TABLET ORAL EVERY 8 HOURS SCHEDULED
Status: DISCONTINUED | OUTPATIENT
Start: 2019-04-17 | End: 2019-04-20 | Stop reason: HOSPADM

## 2019-04-17 RX ORDER — DIPHENHYDRAMINE HYDROCHLORIDE 50 MG/ML
25 INJECTION INTRAMUSCULAR; INTRAVENOUS EVERY 4 HOURS PRN
Status: DISCONTINUED | OUTPATIENT
Start: 2019-04-17 | End: 2019-04-17 | Stop reason: HOSPADM

## 2019-04-17 RX ORDER — 0.9 % SODIUM CHLORIDE 0.9 %
2 VIAL (ML) INJECTION PRN
Status: DISCONTINUED | OUTPATIENT
Start: 2019-04-17 | End: 2019-04-17 | Stop reason: HOSPADM

## 2019-04-17 RX ORDER — 0.9 % SODIUM CHLORIDE 0.9 %
2 VIAL (ML) INJECTION EVERY 12 HOURS SCHEDULED
Status: DISCONTINUED | OUTPATIENT
Start: 2019-04-17 | End: 2019-04-17 | Stop reason: HOSPADM

## 2019-04-17 RX ORDER — CELECOXIB 200 MG/1
200 CAPSULE ORAL ONCE
Status: COMPLETED | OUTPATIENT
Start: 2019-04-17 | End: 2019-04-17

## 2019-04-17 RX ORDER — CEFAZOLIN SODIUM/WATER 2 G/20 ML
2000 SYRINGE (ML) INTRAVENOUS
Status: DISCONTINUED | OUTPATIENT
Start: 2019-04-17 | End: 2019-04-17 | Stop reason: HOSPADM

## 2019-04-17 RX ORDER — DEXTROSE MONOHYDRATE 25 G/50ML
25 INJECTION, SOLUTION INTRAVENOUS PRN
Status: DISCONTINUED | OUTPATIENT
Start: 2019-04-17 | End: 2019-04-17 | Stop reason: HOSPADM

## 2019-04-17 RX ORDER — HUMAN INSULIN 100 [IU]/ML
INJECTION, SOLUTION SUBCUTANEOUS
Status: DISCONTINUED | OUTPATIENT
Start: 2019-04-17 | End: 2019-04-17 | Stop reason: HOSPADM

## 2019-04-17 RX ORDER — SODIUM CHLORIDE, SODIUM LACTATE, POTASSIUM CHLORIDE, CALCIUM CHLORIDE 600; 310; 30; 20 MG/100ML; MG/100ML; MG/100ML; MG/100ML
INJECTION, SOLUTION INTRAVENOUS CONTINUOUS
Status: DISCONTINUED | OUTPATIENT
Start: 2019-04-17 | End: 2019-04-20 | Stop reason: HOSPADM

## 2019-04-17 RX ORDER — SERTRALINE HYDROCHLORIDE 100 MG/1
100 TABLET, FILM COATED ORAL DAILY
Status: DISCONTINUED | OUTPATIENT
Start: 2019-04-18 | End: 2019-04-20 | Stop reason: HOSPADM

## 2019-04-17 RX ORDER — DIPHENHYDRAMINE HYDROCHLORIDE 50 MG/ML
12.5 INJECTION INTRAMUSCULAR; INTRAVENOUS EVERY 4 HOURS PRN
Status: DISCONTINUED | OUTPATIENT
Start: 2019-04-17 | End: 2019-04-17 | Stop reason: HOSPADM

## 2019-04-17 RX ORDER — DEXAMETHASONE SODIUM PHOSPHATE 4 MG/ML
INJECTION, SOLUTION INTRA-ARTICULAR; INTRALESIONAL; INTRAMUSCULAR; INTRAVENOUS; SOFT TISSUE PRN
Status: DISCONTINUED | OUTPATIENT
Start: 2019-04-17 | End: 2019-04-17

## 2019-04-17 RX ORDER — ACETAMINOPHEN 500 MG
1000 TABLET ORAL ONCE
Status: DISCONTINUED | OUTPATIENT
Start: 2019-04-17 | End: 2019-04-17 | Stop reason: HOSPADM

## 2019-04-17 RX ORDER — ROCURONIUM BROMIDE 10 MG/ML
INJECTION, SOLUTION INTRAVENOUS PRN
Status: DISCONTINUED | OUTPATIENT
Start: 2019-04-17 | End: 2019-04-17

## 2019-04-17 RX ORDER — SODIUM CHLORIDE 9 MG/ML
INJECTION, SOLUTION INTRAVENOUS CONTINUOUS
Status: DISCONTINUED | OUTPATIENT
Start: 2019-04-17 | End: 2019-04-17 | Stop reason: ALTCHOICE

## 2019-04-17 RX ORDER — DULOXETIN HYDROCHLORIDE 60 MG/1
60 CAPSULE, DELAYED RELEASE ORAL 2 TIMES DAILY
Status: DISCONTINUED | OUTPATIENT
Start: 2019-04-17 | End: 2019-04-20 | Stop reason: HOSPADM

## 2019-04-17 RX ORDER — METOCLOPRAMIDE HYDROCHLORIDE 5 MG/ML
5 INJECTION INTRAMUSCULAR; INTRAVENOUS EVERY 6 HOURS PRN
Status: DISCONTINUED | OUTPATIENT
Start: 2019-04-17 | End: 2019-04-17 | Stop reason: HOSPADM

## 2019-04-17 RX ORDER — DEXTROSE MONOHYDRATE 50 MG/ML
INJECTION, SOLUTION INTRAVENOUS CONTINUOUS PRN
Status: DISCONTINUED | OUTPATIENT
Start: 2019-04-17 | End: 2019-04-17

## 2019-04-17 RX ORDER — PREGABALIN 100 MG/1
100 CAPSULE ORAL 2 TIMES DAILY
Status: DISCONTINUED | OUTPATIENT
Start: 2019-04-17 | End: 2019-04-20 | Stop reason: HOSPADM

## 2019-04-17 RX ORDER — CEFAZOLIN SODIUM 1 G/3ML
INJECTION, POWDER, FOR SOLUTION INTRAMUSCULAR; INTRAVENOUS PRN
Status: DISCONTINUED | OUTPATIENT
Start: 2019-04-17 | End: 2019-04-17

## 2019-04-17 RX ORDER — LEVOTHYROXINE SODIUM 0.05 MG/1
50 TABLET ORAL
Status: DISCONTINUED | OUTPATIENT
Start: 2019-04-18 | End: 2019-04-20 | Stop reason: HOSPADM

## 2019-04-17 RX ORDER — ONDANSETRON 2 MG/ML
4 INJECTION INTRAMUSCULAR; INTRAVENOUS 2 TIMES DAILY PRN
Status: DISCONTINUED | OUTPATIENT
Start: 2019-04-17 | End: 2019-04-20 | Stop reason: HOSPADM

## 2019-04-17 RX ORDER — BUPROPION HYDROCHLORIDE 150 MG/1
450 TABLET ORAL DAILY
Status: DISCONTINUED | OUTPATIENT
Start: 2019-04-18 | End: 2019-04-20 | Stop reason: HOSPADM

## 2019-04-17 RX ORDER — ENOXAPARIN SODIUM 100 MG/ML
40 INJECTION SUBCUTANEOUS DAILY
Status: DISCONTINUED | OUTPATIENT
Start: 2019-04-18 | End: 2019-04-20 | Stop reason: HOSPADM

## 2019-04-17 RX ORDER — LIDOCAINE HYDROCHLORIDE 10 MG/ML
5-10 INJECTION, SOLUTION INFILTRATION; PERINEURAL PRN
Status: DISCONTINUED | OUTPATIENT
Start: 2019-04-17 | End: 2019-04-17 | Stop reason: HOSPADM

## 2019-04-17 RX ORDER — MUPIROCIN 20 MG/G
1 OINTMENT TOPICAL EVERY 12 HOURS SCHEDULED
Status: DISCONTINUED | OUTPATIENT
Start: 2019-04-17 | End: 2019-04-19 | Stop reason: CLARIF

## 2019-04-17 RX ORDER — MUPIROCIN 20 MG/G
1 OINTMENT TOPICAL
Status: DISCONTINUED | OUTPATIENT
Start: 2019-04-17 | End: 2019-04-17 | Stop reason: HOSPADM

## 2019-04-17 RX ORDER — SODIUM CHLORIDE, SODIUM LACTATE, POTASSIUM CHLORIDE, CALCIUM CHLORIDE 600; 310; 30; 20 MG/100ML; MG/100ML; MG/100ML; MG/100ML
INJECTION, SOLUTION INTRAVENOUS CONTINUOUS
Status: DISCONTINUED | OUTPATIENT
Start: 2019-04-17 | End: 2019-04-17

## 2019-04-17 RX ORDER — CYCLOBENZAPRINE HCL 10 MG
5 TABLET ORAL 3 TIMES DAILY PRN
Status: DISCONTINUED | OUTPATIENT
Start: 2019-04-17 | End: 2019-04-20 | Stop reason: HOSPADM

## 2019-04-17 RX ORDER — PREGABALIN 50 MG/1
50 CAPSULE ORAL ONCE
Status: DISCONTINUED | OUTPATIENT
Start: 2019-04-17 | End: 2019-04-17 | Stop reason: HOSPADM

## 2019-04-17 RX ORDER — FAMOTIDINE 10 MG/ML
20 INJECTION, SOLUTION INTRAVENOUS
Status: DISCONTINUED | OUTPATIENT
Start: 2019-04-17 | End: 2019-04-17 | Stop reason: HOSPADM

## 2019-04-17 RX ORDER — VANCOMYCIN HYDROCHLORIDE 1 G/20ML
INJECTION, POWDER, LYOPHILIZED, FOR SOLUTION INTRAVENOUS PRN
Status: DISCONTINUED | OUTPATIENT
Start: 2019-04-17 | End: 2019-04-17 | Stop reason: HOSPADM

## 2019-04-17 RX ORDER — ONDANSETRON 4 MG/1
4 TABLET, ORALLY DISINTEGRATING ORAL
Status: COMPLETED | OUTPATIENT
Start: 2019-04-17 | End: 2019-04-17

## 2019-04-17 RX ORDER — FERROUS SULFATE 325(65) MG
325 TABLET ORAL 3 TIMES DAILY
Status: DISCONTINUED | OUTPATIENT
Start: 2019-04-17 | End: 2019-04-20 | Stop reason: HOSPADM

## 2019-04-17 RX ORDER — DIPHENHYDRAMINE HCL 12.5MG/5ML
12.5 LIQUID (ML) ORAL EVERY 6 HOURS PRN
Status: DISCONTINUED | OUTPATIENT
Start: 2019-04-17 | End: 2019-04-20 | Stop reason: HOSPADM

## 2019-04-17 RX ORDER — SENNOSIDES A AND B 8.6 MG/1
1 TABLET, FILM COATED ORAL NIGHTLY
Status: DISCONTINUED | OUTPATIENT
Start: 2019-04-17 | End: 2019-04-20 | Stop reason: HOSPADM

## 2019-04-17 RX ORDER — MIDAZOLAM HYDROCHLORIDE 1 MG/ML
2 INJECTION, SOLUTION INTRAMUSCULAR; INTRAVENOUS
Status: DISCONTINUED | OUTPATIENT
Start: 2019-04-17 | End: 2019-04-17 | Stop reason: HOSPADM

## 2019-04-17 RX ORDER — BISACODYL 10 MG
10 SUPPOSITORY, RECTAL RECTAL DAILY PRN
Status: DISCONTINUED | OUTPATIENT
Start: 2019-04-17 | End: 2019-04-20 | Stop reason: HOSPADM

## 2019-04-17 RX ORDER — CALCIUM CARBONATE 500 MG/1
500-1000 TABLET, CHEWABLE ORAL EVERY 4 HOURS PRN
Status: DISCONTINUED | OUTPATIENT
Start: 2019-04-17 | End: 2019-04-20 | Stop reason: HOSPADM

## 2019-04-17 RX ORDER — PANTOPRAZOLE SODIUM 40 MG/1
40 TABLET, DELAYED RELEASE ORAL
Status: DISCONTINUED | OUTPATIENT
Start: 2019-04-18 | End: 2019-04-20 | Stop reason: HOSPADM

## 2019-04-17 RX ORDER — MODAFINIL 100 MG/1
200 TABLET ORAL 2 TIMES DAILY
Status: DISCONTINUED | OUTPATIENT
Start: 2019-04-18 | End: 2019-04-20 | Stop reason: HOSPADM

## 2019-04-17 RX ORDER — PROPOFOL 10 MG/ML
INJECTION, EMULSION INTRAVENOUS PRN
Status: DISCONTINUED | OUTPATIENT
Start: 2019-04-17 | End: 2019-04-17

## 2019-04-17 RX ORDER — LIDOCAINE HYDROCHLORIDE 20 MG/ML
INJECTION, SOLUTION INFILTRATION; PERINEURAL PRN
Status: DISCONTINUED | OUTPATIENT
Start: 2019-04-17 | End: 2019-04-17

## 2019-04-17 RX ORDER — DEXAMETHASONE SODIUM PHOSPHATE 10 MG/ML
10 INJECTION, SOLUTION INTRAMUSCULAR; INTRAVENOUS ONCE
Status: DISCONTINUED | OUTPATIENT
Start: 2019-04-18 | End: 2019-04-20 | Stop reason: HOSPADM

## 2019-04-17 RX ORDER — TOPIRAMATE 100 MG/1
200 TABLET, FILM COATED ORAL DAILY
Status: DISCONTINUED | OUTPATIENT
Start: 2019-04-18 | End: 2019-04-20 | Stop reason: HOSPADM

## 2019-04-17 RX ADMIN — TRANEXAMIC ACID 1000 MG: 100 INJECTION, SOLUTION INTRAVENOUS at 11:30

## 2019-04-17 RX ADMIN — WATER 2000 MG: 100 INJECTION, SOLUTION INTRAVENOUS at 21:34

## 2019-04-17 RX ADMIN — ONDANSETRON 4 MG: 4 TABLET, ORALLY DISINTEGRATING ORAL at 10:01

## 2019-04-17 RX ADMIN — PROPOFOL 100 MG: 10 INJECTION, EMULSION INTRAVENOUS at 10:56

## 2019-04-17 RX ADMIN — FENTANYL CITRATE 100 MCG: 50 INJECTION INTRAMUSCULAR; INTRAVENOUS at 11:47

## 2019-04-17 RX ADMIN — FENTANYL CITRATE 50 MCG: 50 INJECTION INTRAMUSCULAR; INTRAVENOUS at 10:56

## 2019-04-17 RX ADMIN — SODIUM CHLORIDE, POTASSIUM CHLORIDE, SODIUM LACTATE AND CALCIUM CHLORIDE: 600; 310; 30; 20 INJECTION, SOLUTION INTRAVENOUS at 12:45

## 2019-04-17 RX ADMIN — PREGABALIN 100 MG: 100 CAPSULE ORAL at 18:32

## 2019-04-17 RX ADMIN — HYDROMORPHONE HYDROCHLORIDE 0.2 MG: 1 INJECTION, SOLUTION INTRAMUSCULAR; INTRAVENOUS; SUBCUTANEOUS at 15:13

## 2019-04-17 RX ADMIN — SODIUM CHLORIDE, POTASSIUM CHLORIDE, SODIUM LACTATE AND CALCIUM CHLORIDE: 600; 310; 30; 20 INJECTION, SOLUTION INTRAVENOUS at 10:50

## 2019-04-17 RX ADMIN — LIDOCAINE HYDROCHLORIDE 50 MG: 20 INJECTION, SOLUTION INFILTRATION; PERINEURAL at 10:56

## 2019-04-17 RX ADMIN — DEXAMETHASONE SODIUM PHOSPHATE 10 MG: 4 INJECTION, SOLUTION INTRAMUSCULAR; INTRAVENOUS at 11:01

## 2019-04-17 RX ADMIN — ACETAMINOPHEN 1000 MG: 500 TABLET ORAL at 21:33

## 2019-04-17 RX ADMIN — OXYCODONE HYDROCHLORIDE 10 MG: 5 TABLET ORAL at 21:52

## 2019-04-17 RX ADMIN — HYDROMORPHONE HYDROCHLORIDE 0.2 MG: 1 INJECTION, SOLUTION INTRAMUSCULAR; INTRAVENOUS; SUBCUTANEOUS at 14:58

## 2019-04-17 RX ADMIN — Medication 325 MG: at 21:34

## 2019-04-17 RX ADMIN — DULOXETINE HYDROCHLORIDE 60 MG: 60 CAPSULE, DELAYED RELEASE ORAL at 21:35

## 2019-04-17 RX ADMIN — FENTANYL CITRATE 50 MCG: 50 INJECTION INTRAMUSCULAR; INTRAVENOUS at 11:23

## 2019-04-17 RX ADMIN — OXYCODONE HYDROCHLORIDE 5 MG: 5 TABLET ORAL at 18:33

## 2019-04-17 RX ADMIN — TOPIRAMATE 400 MG: 100 TABLET, FILM COATED ORAL at 21:35

## 2019-04-17 RX ADMIN — ROCURONIUM BROMIDE 30 MG: 10 INJECTION INTRAVENOUS at 10:56

## 2019-04-17 RX ADMIN — SUGAMMADEX 200 MG: 100 INJECTION, SOLUTION INTRAVENOUS at 12:53

## 2019-04-17 RX ADMIN — FAMOTIDINE 20 MG: 20 TABLET, FILM COATED ORAL at 10:00

## 2019-04-17 RX ADMIN — MUPIROCIN 1 APPLICATION.: 20 OINTMENT TOPICAL at 21:33

## 2019-04-17 RX ADMIN — CELECOXIB 200 MG: 200 CAPSULE ORAL at 10:01

## 2019-04-17 RX ADMIN — CEFAZOLIN SODIUM 2 G: 1 INJECTION, POWDER, FOR SOLUTION INTRAMUSCULAR; INTRAVENOUS at 11:02

## 2019-04-17 RX ADMIN — MIDAZOLAM HYDROCHLORIDE 2 MG: 1 INJECTION, SOLUTION INTRAMUSCULAR; INTRAVENOUS at 10:40

## 2019-04-17 RX ADMIN — HYDROMORPHONE HYDROCHLORIDE 0.2 MG: 1 INJECTION, SOLUTION INTRAMUSCULAR; INTRAVENOUS; SUBCUTANEOUS at 15:27

## 2019-04-17 RX ADMIN — SENNOSIDES 8.6 MG: 8.6 TABLET ORAL at 21:33

## 2019-04-17 RX ADMIN — SODIUM CHLORIDE, POTASSIUM CHLORIDE, SODIUM LACTATE AND CALCIUM CHLORIDE: 600; 310; 30; 20 INJECTION, SOLUTION INTRAVENOUS at 09:40

## 2019-04-17 ASSESSMENT — PAIN SCALES - GENERAL
PAIN_LEVEL_AT_REST: 2
PAIN_LEVEL_AT_REST: 5
PAIN_LEVEL_WITH_ACTIVITY: 8
PAIN_LEVEL_AT_REST: 5
PAIN_LEVEL_AT_REST: 8
PAIN_LEVEL_AT_REST: 10
PAIN_LEVEL_WITH_ACTIVITY: 8
PAIN_LEVEL_AT_REST: 6
PAIN_LEVEL_AT_REST: 5
PAIN_LEVEL_AT_REST: 5
PAIN_LEVEL_AT_REST: 10

## 2019-04-17 ASSESSMENT — ACTIVITIES OF DAILY LIVING (ADL)
GROOMING: INDEPENDENT
EATING: INDEPENDENT
PRIOR_ADL: INDEPENDENT
PRIOR_ADL_BATHING: INDEPENDENT
PRIOR_ADL_TOILETING: INDEPENDENT

## 2019-04-17 ASSESSMENT — COGNITIVE AND FUNCTIONAL STATUS - GENERAL
BECAUSE OF A PHYSICAL, MENTAL, OR EMOTIONAL CONDITION, DO YOU HAVE DIFFICULTY DOING ERRANDS ALONE: YES
DO YOU HAVE SERIOUS DIFFICULTY WALKING OR CLIMBING STAIRS: YES
DO YOU HAVE DIFFICULTY DRESSING OR BATHING: YES
BECAUSE OF A PHYSICAL, MENTAL, OR EMOTIONAL CONDITION, DO YOU HAVE SERIOUS DIFFICULTY CONCENTRATING, REMEMBERING OR MAKING DECISIONS: NO

## 2019-04-18 ENCOUNTER — APPOINTMENT (OUTPATIENT)
Dept: REHABILITATION | Age: 64
End: 2019-04-18

## 2019-04-18 LAB
ANION GAP SERPL CALC-SCNC: 11 MMOL/L (ref 10–20)
BASOPHILS # BLD AUTO: 0 K/MCL (ref 0–0.3)
BASOPHILS NFR BLD AUTO: 1 %
BUN SERPL-MCNC: 25 MG/DL (ref 6–20)
BUN/CREAT SERPL: 19 (ref 7–25)
CALCIUM SERPL-MCNC: 8.4 MG/DL (ref 8.4–10.2)
CHLORIDE SERPL-SCNC: 108 MMOL/L (ref 98–107)
CO2 SERPL-SCNC: 23 MMOL/L (ref 21–32)
CREAT SERPL-MCNC: 1.32 MG/DL (ref 0.51–0.95)
DIFFERENTIAL METHOD BLD: ABNORMAL
EOSINOPHIL # BLD AUTO: 0 K/MCL (ref 0.1–0.5)
EOSINOPHIL NFR SPEC: 1 %
ERYTHROCYTE [DISTWIDTH] IN BLOOD: 12.5 % (ref 11–15)
GLUCOSE SERPL-MCNC: 94 MG/DL (ref 65–99)
HCT VFR BLD CALC: 25.1 % (ref 36–46.5)
HGB BLD-MCNC: 8 G/DL (ref 12–15.5)
IMM GRANULOCYTES # BLD AUTO: 0 K/MCL (ref 0–0.2)
IMM GRANULOCYTES NFR BLD: 0 %
LYMPHOCYTES # BLD MANUAL: 1.5 K/MCL (ref 1–4)
LYMPHOCYTES NFR BLD MANUAL: 29 %
MCH RBC QN AUTO: 33.5 PG (ref 26–34)
MCHC RBC AUTO-ENTMCNC: 31.9 G/DL (ref 32–36.5)
MCV RBC AUTO: 105 FL (ref 78–100)
MONOCYTES # BLD MANUAL: 0.6 K/MCL (ref 0.3–0.9)
MONOCYTES NFR BLD MANUAL: 11 %
NEUTROPHILS # BLD: 3.1 K/MCL (ref 1.8–7.7)
NEUTROPHILS NFR BLD AUTO: 58 %
NRBC BLD MANUAL-RTO: 0 /100 WBC
PLATELET # BLD: 162 K/MCL (ref 140–450)
POTASSIUM SERPL-SCNC: 4 MMOL/L (ref 3.4–5.1)
RBC # BLD: 2.39 MIL/MCL (ref 4–5.2)
SODIUM SERPL-SCNC: 138 MMOL/L (ref 135–145)
WBC # BLD: 5.3 K/MCL (ref 4.2–11)

## 2019-04-18 PROCEDURE — 97535 SELF CARE MNGMENT TRAINING: CPT

## 2019-04-18 PROCEDURE — 10002800 HB RX 250 W HCPCS: Performed by: PHYSICIAN ASSISTANT

## 2019-04-18 PROCEDURE — 10002803 HB RX 637: Performed by: PHYSICIAN ASSISTANT

## 2019-04-18 PROCEDURE — 10000002 HB ROOM CHARGE MED SURG

## 2019-04-18 PROCEDURE — 10002803 HB RX 637: Performed by: ORTHOPAEDIC SURGERY

## 2019-04-18 PROCEDURE — 97167 OT EVAL HIGH COMPLEX 60 MIN: CPT

## 2019-04-18 PROCEDURE — 97530 THERAPEUTIC ACTIVITIES: CPT

## 2019-04-18 PROCEDURE — 80048 BASIC METABOLIC PNL TOTAL CA: CPT

## 2019-04-18 PROCEDURE — 10004173 HB COUNTER-THERAPY VISIT PT

## 2019-04-18 PROCEDURE — 85025 COMPLETE CBC W/AUTO DIFF WBC: CPT

## 2019-04-18 PROCEDURE — 97116 GAIT TRAINING THERAPY: CPT

## 2019-04-18 PROCEDURE — 10004651 HB RX, NO CHARGE ITEM: Performed by: PHYSICIAN ASSISTANT

## 2019-04-18 PROCEDURE — 10004172 HB COUNTER-THERAPY VISIT OT

## 2019-04-18 RX ORDER — ASPIRIN 325 MG
325 TABLET ORAL DAILY
Qty: 40 TABLET | Refills: 0 | Status: SHIPPED | OUTPATIENT
Start: 2019-04-18 | End: 2019-04-19 | Stop reason: SDUPTHER

## 2019-04-18 RX ORDER — AMOXICILLIN 250 MG
1-2 CAPSULE ORAL DAILY PRN
Qty: 30 TABLET | Refills: 0 | Status: SHIPPED | OUTPATIENT
Start: 2019-04-18 | End: 2019-04-19 | Stop reason: SDUPTHER

## 2019-04-18 RX ORDER — OXYCODONE HYDROCHLORIDE 5 MG/1
5-10 TABLET ORAL EVERY 4 HOURS PRN
Qty: 50 TABLET | Refills: 0 | Status: SHIPPED | OUTPATIENT
Start: 2019-04-18 | End: 2019-04-19

## 2019-04-18 RX ADMIN — OXYCODONE HYDROCHLORIDE 10 MG: 5 TABLET ORAL at 19:30

## 2019-04-18 RX ADMIN — OXYCODONE HYDROCHLORIDE 10 MG: 5 TABLET ORAL at 13:09

## 2019-04-18 RX ADMIN — TOPIRAMATE 200 MG: 100 TABLET, FILM COATED ORAL at 08:20

## 2019-04-18 RX ADMIN — VERAPAMIL HYDROCHLORIDE 360 MG: 180 TABLET, FILM COATED, EXTENDED RELEASE ORAL at 08:27

## 2019-04-18 RX ADMIN — PREGABALIN 100 MG: 100 CAPSULE ORAL at 18:30

## 2019-04-18 RX ADMIN — ACETAMINOPHEN 1000 MG: 500 TABLET ORAL at 13:09

## 2019-04-18 RX ADMIN — SERTRALINE HYDROCHLORIDE 100 MG: 100 TABLET, FILM COATED ORAL at 08:20

## 2019-04-18 RX ADMIN — Medication 325 MG: at 08:21

## 2019-04-18 RX ADMIN — TOPIRAMATE 400 MG: 100 TABLET, FILM COATED ORAL at 22:47

## 2019-04-18 RX ADMIN — PANTOPRAZOLE SODIUM 40 MG: 40 TABLET, DELAYED RELEASE ORAL at 06:18

## 2019-04-18 RX ADMIN — OXYCODONE HYDROCHLORIDE 10 MG: 5 TABLET ORAL at 02:34

## 2019-04-18 RX ADMIN — OXYCODONE HYDROCHLORIDE 10 MG: 5 TABLET ORAL at 10:14

## 2019-04-18 RX ADMIN — DULOXETINE HYDROCHLORIDE 60 MG: 60 CAPSULE, DELAYED RELEASE ORAL at 08:21

## 2019-04-18 RX ADMIN — MODAFINIL 200 MG: 100 TABLET ORAL at 08:25

## 2019-04-18 RX ADMIN — LEVOTHYROXINE SODIUM 50 MCG: 0.05 TABLET ORAL at 06:18

## 2019-04-18 RX ADMIN — SENNOSIDES 8.6 MG: 8.6 TABLET ORAL at 22:47

## 2019-04-18 RX ADMIN — OXYCODONE HYDROCHLORIDE 10 MG: 5 TABLET ORAL at 16:13

## 2019-04-18 RX ADMIN — MODAFINIL 200 MG: 100 TABLET ORAL at 13:10

## 2019-04-18 RX ADMIN — PREGABALIN 100 MG: 100 CAPSULE ORAL at 08:21

## 2019-04-18 RX ADMIN — OXYCODONE HYDROCHLORIDE 15 MG: 5 TABLET ORAL at 22:47

## 2019-04-18 RX ADMIN — BUPROPION HYDROCHLORIDE 450 MG: 150 TABLET, FILM COATED, EXTENDED RELEASE ORAL at 08:21

## 2019-04-18 RX ADMIN — CYCLOBENZAPRINE HYDROCHLORIDE 5 MG: 10 TABLET, FILM COATED ORAL at 13:10

## 2019-04-18 RX ADMIN — Medication 325 MG: at 22:47

## 2019-04-18 RX ADMIN — OXYCODONE HYDROCHLORIDE 10 MG: 5 TABLET ORAL at 06:18

## 2019-04-18 RX ADMIN — ACETAMINOPHEN 1000 MG: 500 TABLET ORAL at 22:47

## 2019-04-18 RX ADMIN — ENOXAPARIN SODIUM 40 MG: 40 INJECTION SUBCUTANEOUS at 06:17

## 2019-04-18 RX ADMIN — Medication 325 MG: at 13:10

## 2019-04-18 RX ADMIN — ACETAMINOPHEN 1000 MG: 500 TABLET ORAL at 06:18

## 2019-04-18 RX ADMIN — CYCLOBENZAPRINE HYDROCHLORIDE 5 MG: 10 TABLET, FILM COATED ORAL at 08:20

## 2019-04-18 RX ADMIN — DULOXETINE HYDROCHLORIDE 60 MG: 60 CAPSULE, DELAYED RELEASE ORAL at 18:30

## 2019-04-18 ASSESSMENT — PAIN SCALES - GENERAL
PAIN_LEVEL_AT_REST: 8
PAIN_LEVEL_AT_REST: 8
PAIN_LEVEL_AT_REST: 10
PAIN_LEVEL_WITH_ACTIVITY: 8
PAIN_LEVEL_AT_REST: 8
PAIN_LEVEL_AT_REST: 10
PAIN_LEVEL_AT_REST: 10
PAIN_LEVEL_AT_REST: 3
PAIN_LEVEL_AT_REST: 7
PAIN_LEVEL_AT_REST: 10
PAIN_LEVEL_AT_REST: 8

## 2019-04-18 ASSESSMENT — ACTIVITIES OF DAILY LIVING (ADL)
TOILETING: NEEDS ASSISTANCE
ADL_SCORE: 8
BATHING: NEEDS ASSISTANCE
DRESSING: NEEDS ASSISTANCE
DRESSING: NEEDS ASSISTANCE
TOILETING: NEEDS ASSISTANCE
FEEDING: INDEPENDENT
ADL_SCORE: 8
BATHING: NEEDS ASSISTANCE
FEEDING: INDEPENDENT

## 2019-04-19 ENCOUNTER — APPOINTMENT (OUTPATIENT)
Dept: REHABILITATION | Age: 64
End: 2019-04-19

## 2019-04-19 LAB
ANION GAP SERPL CALC-SCNC: 11 MMOL/L (ref 10–20)
BASOPHILS # BLD AUTO: 0 K/MCL (ref 0–0.3)
BASOPHILS NFR BLD AUTO: 1 %
BUN SERPL-MCNC: 18 MG/DL (ref 6–20)
BUN/CREAT SERPL: 17 (ref 7–25)
CALCIUM SERPL-MCNC: 8.2 MG/DL (ref 8.4–10.2)
CHLORIDE SERPL-SCNC: 108 MMOL/L (ref 98–107)
CO2 SERPL-SCNC: 23 MMOL/L (ref 21–32)
CREAT SERPL-MCNC: 1.04 MG/DL (ref 0.51–0.95)
DIFFERENTIAL METHOD BLD: ABNORMAL
EOSINOPHIL # BLD AUTO: 0 K/MCL (ref 0.1–0.5)
EOSINOPHIL NFR SPEC: 1 %
ERYTHROCYTE [DISTWIDTH] IN BLOOD: 12.4 % (ref 11–15)
GLUCOSE SERPL-MCNC: 117 MG/DL (ref 65–99)
HCT VFR BLD CALC: 25.5 % (ref 36–46.5)
HGB BLD-MCNC: 8 G/DL (ref 12–15.5)
IMM GRANULOCYTES # BLD AUTO: 0 K/MCL (ref 0–0.2)
IMM GRANULOCYTES NFR BLD: 0 %
LYMPHOCYTES # BLD MANUAL: 1 K/MCL (ref 1–4)
LYMPHOCYTES NFR BLD MANUAL: 19 %
MCH RBC QN AUTO: 32.9 PG (ref 26–34)
MCHC RBC AUTO-ENTMCNC: 31.4 G/DL (ref 32–36.5)
MCV RBC AUTO: 104.9 FL (ref 78–100)
MONOCYTES # BLD MANUAL: 0.5 K/MCL (ref 0.3–0.9)
MONOCYTES NFR BLD MANUAL: 10 %
NEUTROPHILS # BLD: 3.7 K/MCL (ref 1.8–7.7)
NEUTROPHILS NFR BLD AUTO: 69 %
NRBC BLD MANUAL-RTO: 0 /100 WBC
PLATELET # BLD: 150 K/MCL (ref 140–450)
POTASSIUM SERPL-SCNC: 3.9 MMOL/L (ref 3.4–5.1)
RBC # BLD: 2.43 MIL/MCL (ref 4–5.2)
SODIUM SERPL-SCNC: 138 MMOL/L (ref 135–145)
WBC # BLD: 5.3 K/MCL (ref 4.2–11)

## 2019-04-19 PROCEDURE — 10004651 HB RX, NO CHARGE ITEM: Performed by: PHYSICIAN ASSISTANT

## 2019-04-19 PROCEDURE — 97116 GAIT TRAINING THERAPY: CPT

## 2019-04-19 PROCEDURE — 97530 THERAPEUTIC ACTIVITIES: CPT

## 2019-04-19 PROCEDURE — 10004173 HB COUNTER-THERAPY VISIT PT

## 2019-04-19 PROCEDURE — 97535 SELF CARE MNGMENT TRAINING: CPT

## 2019-04-19 PROCEDURE — 10000002 HB ROOM CHARGE MED SURG

## 2019-04-19 PROCEDURE — 10002803 HB RX 637: Performed by: PHYSICIAN ASSISTANT

## 2019-04-19 PROCEDURE — 36415 COLL VENOUS BLD VENIPUNCTURE: CPT

## 2019-04-19 PROCEDURE — 80048 BASIC METABOLIC PNL TOTAL CA: CPT

## 2019-04-19 PROCEDURE — 10002800 HB RX 250 W HCPCS: Performed by: PHYSICIAN ASSISTANT

## 2019-04-19 PROCEDURE — 10004172 HB COUNTER-THERAPY VISIT OT

## 2019-04-19 PROCEDURE — 10004325 HB COUNTER ASSESSMENT EA 15 MIN: Performed by: DIETITIAN, REGISTERED

## 2019-04-19 PROCEDURE — 85025 COMPLETE CBC W/AUTO DIFF WBC: CPT

## 2019-04-19 PROCEDURE — 10002803 HB RX 637: Performed by: ORTHOPAEDIC SURGERY

## 2019-04-19 RX ORDER — MULTIVITAMIN,THER AND MINERALS
1 TABLET ORAL DAILY
Status: DISCONTINUED | OUTPATIENT
Start: 2019-04-19 | End: 2019-04-20 | Stop reason: HOSPADM

## 2019-04-19 RX ORDER — OXYCODONE HYDROCHLORIDE 5 MG/1
5-10 TABLET ORAL EVERY 4 HOURS PRN
Qty: 50 TABLET | Refills: 0 | Status: SHIPPED | OUTPATIENT
Start: 2019-04-19 | End: 2019-04-30 | Stop reason: SDUPTHER

## 2019-04-19 RX ORDER — AMOXICILLIN 250 MG
1-2 CAPSULE ORAL DAILY PRN
Qty: 30 TABLET | Refills: 0 | Status: SHIPPED
Start: 2019-04-19 | End: 2019-10-14 | Stop reason: ALTCHOICE

## 2019-04-19 RX ORDER — POTASSIUM CHLORIDE 20 MEQ/1
20 TABLET, EXTENDED RELEASE ORAL
Status: DISCONTINUED | OUTPATIENT
Start: 2019-04-19 | End: 2019-04-20 | Stop reason: HOSPADM

## 2019-04-19 RX ORDER — FUROSEMIDE 80 MG
80 TABLET ORAL DAILY
Status: DISCONTINUED | OUTPATIENT
Start: 2019-04-19 | End: 2019-04-20 | Stop reason: HOSPADM

## 2019-04-19 RX ORDER — ASPIRIN 325 MG
325 TABLET ORAL DAILY
Qty: 40 TABLET | Refills: 0 | Status: SHIPPED
Start: 2019-04-19 | End: 2019-05-28 | Stop reason: ALTCHOICE

## 2019-04-19 RX ADMIN — MODAFINIL 200 MG: 100 TABLET ORAL at 08:10

## 2019-04-19 RX ADMIN — OXYCODONE HYDROCHLORIDE 15 MG: 5 TABLET ORAL at 15:37

## 2019-04-19 RX ADMIN — PANTOPRAZOLE SODIUM 40 MG: 40 TABLET, DELAYED RELEASE ORAL at 04:46

## 2019-04-19 RX ADMIN — Medication 325 MG: at 08:11

## 2019-04-19 RX ADMIN — Medication 325 MG: at 21:04

## 2019-04-19 RX ADMIN — DULOXETINE HYDROCHLORIDE 60 MG: 60 CAPSULE, DELAYED RELEASE ORAL at 08:12

## 2019-04-19 RX ADMIN — MODAFINIL 200 MG: 100 TABLET ORAL at 11:54

## 2019-04-19 RX ADMIN — BUPROPION HYDROCHLORIDE 450 MG: 150 TABLET, FILM COATED, EXTENDED RELEASE ORAL at 08:12

## 2019-04-19 RX ADMIN — CYCLOBENZAPRINE HYDROCHLORIDE 5 MG: 10 TABLET, FILM COATED ORAL at 08:11

## 2019-04-19 RX ADMIN — POTASSIUM CHLORIDE 20 MEQ: 1500 TABLET, EXTENDED RELEASE ORAL at 14:08

## 2019-04-19 RX ADMIN — ACETAMINOPHEN 1000 MG: 500 TABLET ORAL at 21:03

## 2019-04-19 RX ADMIN — ACETAMINOPHEN 1000 MG: 500 TABLET ORAL at 04:46

## 2019-04-19 RX ADMIN — TOPIRAMATE 200 MG: 100 TABLET, FILM COATED ORAL at 08:10

## 2019-04-19 RX ADMIN — Medication 1 TABLET: at 18:04

## 2019-04-19 RX ADMIN — ENOXAPARIN SODIUM 40 MG: 40 INJECTION SUBCUTANEOUS at 08:11

## 2019-04-19 RX ADMIN — CYCLOBENZAPRINE HYDROCHLORIDE 5 MG: 10 TABLET, FILM COATED ORAL at 21:03

## 2019-04-19 RX ADMIN — OXYCODONE HYDROCHLORIDE 15 MG: 5 TABLET ORAL at 11:54

## 2019-04-19 RX ADMIN — OXYCODONE HYDROCHLORIDE 10 MG: 5 TABLET ORAL at 08:12

## 2019-04-19 RX ADMIN — TOPIRAMATE 400 MG: 100 TABLET, FILM COATED ORAL at 21:04

## 2019-04-19 RX ADMIN — ONDANSETRON 4 MG: 2 INJECTION INTRAMUSCULAR; INTRAVENOUS at 16:48

## 2019-04-19 RX ADMIN — LEVOTHYROXINE SODIUM 50 MCG: 0.05 TABLET ORAL at 04:46

## 2019-04-19 RX ADMIN — ACETAMINOPHEN 1000 MG: 500 TABLET ORAL at 14:08

## 2019-04-19 RX ADMIN — DULOXETINE HYDROCHLORIDE 60 MG: 60 CAPSULE, DELAYED RELEASE ORAL at 18:04

## 2019-04-19 RX ADMIN — VERAPAMIL HYDROCHLORIDE 360 MG: 180 TABLET, FILM COATED, EXTENDED RELEASE ORAL at 08:10

## 2019-04-19 RX ADMIN — SENNOSIDES 8.6 MG: 8.6 TABLET ORAL at 21:04

## 2019-04-19 RX ADMIN — OXYCODONE HYDROCHLORIDE 10 MG: 5 TABLET ORAL at 04:46

## 2019-04-19 RX ADMIN — SERTRALINE HYDROCHLORIDE 100 MG: 100 TABLET, FILM COATED ORAL at 08:11

## 2019-04-19 RX ADMIN — PREGABALIN 100 MG: 100 CAPSULE ORAL at 18:07

## 2019-04-19 RX ADMIN — Medication 325 MG: at 14:08

## 2019-04-19 RX ADMIN — PREGABALIN 100 MG: 100 CAPSULE ORAL at 08:11

## 2019-04-19 RX ADMIN — FUROSEMIDE 80 MG: 80 TABLET ORAL at 14:08

## 2019-04-19 RX ADMIN — OXYCODONE HYDROCHLORIDE 15 MG: 5 TABLET ORAL at 18:43

## 2019-04-19 ASSESSMENT — ACTIVITIES OF DAILY LIVING (ADL)
BATHING: NEEDS ASSISTANCE
ADL_SCORE: 8
TOILETING: NEEDS ASSISTANCE
FEEDING: INDEPENDENT
FEEDING: INDEPENDENT
DRESSING: NEEDS ASSISTANCE
BATHING: NEEDS ASSISTANCE
ADL_SCORE: 8
DRESSING: NEEDS ASSISTANCE
TOILETING: NEEDS ASSISTANCE

## 2019-04-19 ASSESSMENT — PAIN SCALES - GENERAL
PAIN_LEVEL_AT_REST: 7
PAIN_LEVEL_AT_REST: 10
PAIN_LEVEL_AT_REST: 6
PAIN_LEVEL_AT_REST: 10
PAIN_LEVEL_AT_REST: 8
PAIN_LEVEL_AT_REST: 7
PAIN_LEVEL_AT_REST: 10
PAIN_LEVEL_AT_REST: 6
PAIN_LEVEL_AT_REST: 4
PAIN_LEVEL_WITH_ACTIVITY: 5
PAIN_LEVEL_AT_REST: 8
PAIN_LEVEL_AT_REST: 8

## 2019-04-20 VITALS
DIASTOLIC BLOOD PRESSURE: 56 MMHG | OXYGEN SATURATION: 100 % | RESPIRATION RATE: 19 BRPM | HEART RATE: 67 BPM | HEIGHT: 64 IN | SYSTOLIC BLOOD PRESSURE: 120 MMHG | BODY MASS INDEX: 22.58 KG/M2 | TEMPERATURE: 98.8 F | WEIGHT: 132.28 LBS

## 2019-04-20 PROCEDURE — 10004651 HB RX, NO CHARGE ITEM: Performed by: PHYSICIAN ASSISTANT

## 2019-04-20 PROCEDURE — 10002803 HB RX 637: Performed by: PHYSICIAN ASSISTANT

## 2019-04-20 PROCEDURE — 10002803 HB RX 637: Performed by: ORTHOPAEDIC SURGERY

## 2019-04-20 PROCEDURE — 99283 EMERGENCY DEPT VISIT LOW MDM: CPT

## 2019-04-20 PROCEDURE — 10002800 HB RX 250 W HCPCS: Performed by: PHYSICIAN ASSISTANT

## 2019-04-20 RX ADMIN — ACETAMINOPHEN 1000 MG: 500 TABLET ORAL at 06:18

## 2019-04-20 RX ADMIN — BUPROPION HYDROCHLORIDE 450 MG: 150 TABLET, FILM COATED, EXTENDED RELEASE ORAL at 08:29

## 2019-04-20 RX ADMIN — OXYCODONE HYDROCHLORIDE 15 MG: 5 TABLET ORAL at 06:18

## 2019-04-20 RX ADMIN — POTASSIUM CHLORIDE 20 MEQ: 1500 TABLET, EXTENDED RELEASE ORAL at 08:29

## 2019-04-20 RX ADMIN — FUROSEMIDE 80 MG: 80 TABLET ORAL at 08:28

## 2019-04-20 RX ADMIN — Medication 1 TABLET: at 08:28

## 2019-04-20 RX ADMIN — VERAPAMIL HYDROCHLORIDE 360 MG: 180 TABLET, FILM COATED, EXTENDED RELEASE ORAL at 08:29

## 2019-04-20 RX ADMIN — PANTOPRAZOLE SODIUM 40 MG: 40 TABLET, DELAYED RELEASE ORAL at 06:18

## 2019-04-20 RX ADMIN — DULOXETINE HYDROCHLORIDE 60 MG: 60 CAPSULE, DELAYED RELEASE ORAL at 08:29

## 2019-04-20 RX ADMIN — TOPIRAMATE 200 MG: 100 TABLET, FILM COATED ORAL at 08:29

## 2019-04-20 RX ADMIN — OXYCODONE HYDROCHLORIDE 15 MG: 5 TABLET ORAL at 12:04

## 2019-04-20 RX ADMIN — MODAFINIL 200 MG: 100 TABLET ORAL at 11:35

## 2019-04-20 RX ADMIN — LEVOTHYROXINE SODIUM 50 MCG: 0.05 TABLET ORAL at 06:18

## 2019-04-20 RX ADMIN — ENOXAPARIN SODIUM 40 MG: 40 INJECTION SUBCUTANEOUS at 08:29

## 2019-04-20 RX ADMIN — OXYCODONE HYDROCHLORIDE 15 MG: 5 TABLET ORAL at 03:02

## 2019-04-20 RX ADMIN — SERTRALINE HYDROCHLORIDE 100 MG: 100 TABLET, FILM COATED ORAL at 08:29

## 2019-04-20 RX ADMIN — MODAFINIL 200 MG: 100 TABLET ORAL at 08:28

## 2019-04-20 RX ADMIN — Medication 325 MG: at 08:29

## 2019-04-20 RX ADMIN — PREGABALIN 100 MG: 100 CAPSULE ORAL at 08:29

## 2019-04-20 ASSESSMENT — ACTIVITIES OF DAILY LIVING (ADL)
FEEDING: INDEPENDENT
DRESSING: INDEPENDENT
ADL_SCORE: 11
TOILETING: INDEPENDENT
BATHING: INDEPENDENT

## 2019-04-20 ASSESSMENT — PAIN SCALES - GENERAL
PAIN_LEVEL_AT_REST: 8
PAIN_LEVEL_AT_REST: 5
PAINLEVEL_OUTOF10: 10
PAIN_LEVEL_AT_REST: 7
PAIN_LEVEL_AT_REST: 10

## 2019-04-21 ENCOUNTER — HOSPITAL ENCOUNTER (EMERGENCY)
Age: 64
Discharge: NURSING, REHAB OR LONG-TERM CARE | End: 2019-04-21
Attending: EMERGENCY MEDICINE

## 2019-04-21 VITALS
SYSTOLIC BLOOD PRESSURE: 112 MMHG | DIASTOLIC BLOOD PRESSURE: 56 MMHG | HEART RATE: 68 BPM | RESPIRATION RATE: 16 BRPM | WEIGHT: 132.28 LBS | HEIGHT: 64 IN | TEMPERATURE: 98.5 F | OXYGEN SATURATION: 100 % | BODY MASS INDEX: 22.58 KG/M2

## 2019-04-21 DIAGNOSIS — G89.18 POST-OP PAIN: Primary | ICD-10-CM

## 2019-04-21 PROCEDURE — 10002803 HB RX 637: Performed by: EMERGENCY MEDICINE

## 2019-04-21 RX ORDER — OXYCODONE HYDROCHLORIDE 5 MG/1
10 TABLET ORAL ONCE
Status: COMPLETED | OUTPATIENT
Start: 2019-04-21 | End: 2019-04-21

## 2019-04-21 RX ADMIN — OXYCODONE HYDROCHLORIDE 10 MG: 5 TABLET ORAL at 03:38

## 2019-04-21 ASSESSMENT — ENCOUNTER SYMPTOMS
SORE THROAT: 0
LIGHT-HEADEDNESS: 0
DIARRHEA: 0
NAUSEA: 0
CHILLS: 0
FEVER: 0
WEAKNESS: 0
DIZZINESS: 0
COUGH: 0
EYES NEGATIVE: 1
SHORTNESS OF BREATH: 0
CHEST TIGHTNESS: 0
BACK PAIN: 0
VOMITING: 0
ABDOMINAL PAIN: 0

## 2019-04-21 ASSESSMENT — PAIN SCALES - GENERAL: PAINLEVEL_OUTOF10: 0

## 2019-04-22 DIAGNOSIS — Z72.820 SLEEP DEPRIVATION: ICD-10-CM

## 2019-04-23 ENCOUNTER — NURSING HOME VISIT (OUTPATIENT)
Dept: POST ACUTE CARE | Age: 64
End: 2019-04-23

## 2019-04-23 DIAGNOSIS — I50.32 CHRONIC DIASTOLIC HEART FAILURE (CMD): ICD-10-CM

## 2019-04-23 DIAGNOSIS — S82.401D CLOSED FRACTURE OF SHAFT OF RIGHT TIBIA AND FIBULA WITH ROUTINE HEALING, SUBSEQUENT ENCOUNTER: ICD-10-CM

## 2019-04-23 DIAGNOSIS — Z86.59 HISTORY OF MAJOR DEPRESSION: ICD-10-CM

## 2019-04-23 DIAGNOSIS — S82.201D CLOSED FRACTURE OF SHAFT OF RIGHT TIBIA AND FIBULA WITH ROUTINE HEALING, SUBSEQUENT ENCOUNTER: ICD-10-CM

## 2019-04-23 DIAGNOSIS — R53.1 GENERALIZED WEAKNESS: Primary | ICD-10-CM

## 2019-04-23 PROCEDURE — 99310 SBSQ NF CARE HIGH MDM 45: CPT | Performed by: NURSE PRACTITIONER

## 2019-04-23 PROCEDURE — 1157F ADVNC CARE PLAN IN RCRD: CPT | Performed by: NURSE PRACTITIONER

## 2019-04-23 PROCEDURE — 1126F AMNT PAIN NOTED NONE PRSNT: CPT | Performed by: NURSE PRACTITIONER

## 2019-04-23 PROCEDURE — 1170F FXNL STATUS ASSESSED: CPT | Performed by: NURSE PRACTITIONER

## 2019-04-23 PROCEDURE — 1158F ADVNC CARE PLAN TLK DOCD: CPT | Performed by: NURSE PRACTITIONER

## 2019-04-23 PROCEDURE — 1160F RVW MEDS BY RX/DR IN RCRD: CPT | Performed by: NURSE PRACTITIONER

## 2019-04-24 ENCOUNTER — DOCUMENTATION (OUTPATIENT)
Dept: FAMILY MEDICINE | Age: 64
End: 2019-04-24

## 2019-04-29 DIAGNOSIS — Z98.890 STATUS POST SURGERY: Primary | ICD-10-CM

## 2019-04-30 ENCOUNTER — IMAGING SERVICES (OUTPATIENT)
Dept: GENERAL RADIOLOGY | Age: 64
End: 2019-04-30
Attending: PHYSICIAN ASSISTANT

## 2019-04-30 ENCOUNTER — OFFICE VISIT (OUTPATIENT)
Dept: ORTHOPEDICS | Age: 64
End: 2019-04-30

## 2019-04-30 DIAGNOSIS — Z98.890 STATUS POST SURGERY: ICD-10-CM

## 2019-04-30 DIAGNOSIS — Z96.641 STATUS POST TOTAL REPLACEMENT OF RIGHT HIP: Primary | ICD-10-CM

## 2019-04-30 PROCEDURE — 73501 X-RAY EXAM HIP UNI 1 VIEW: CPT | Performed by: RADIOLOGY

## 2019-04-30 PROCEDURE — 99024 POSTOP FOLLOW-UP VISIT: CPT | Performed by: PHYSICIAN ASSISTANT

## 2019-04-30 RX ORDER — OXYCODONE HYDROCHLORIDE 5 MG/1
5-10 TABLET ORAL EVERY 6 HOURS PRN
Qty: 50 TABLET | Refills: 0 | Status: SHIPPED | OUTPATIENT
Start: 2019-04-30 | End: 2019-05-13 | Stop reason: SDUPTHER

## 2019-05-01 ENCOUNTER — TELEPHONE (OUTPATIENT)
Dept: PULMONOLOGY | Age: 64
End: 2019-05-01

## 2019-05-01 ENCOUNTER — NURSE TRIAGE (OUTPATIENT)
Dept: TELEHEALTH | Age: 64
End: 2019-05-01

## 2019-05-01 ENCOUNTER — OFFICE VISIT (OUTPATIENT)
Dept: PULMONOLOGY | Age: 64
End: 2019-05-01

## 2019-05-01 VITALS
DIASTOLIC BLOOD PRESSURE: 50 MMHG | HEART RATE: 89 BPM | OXYGEN SATURATION: 100 % | RESPIRATION RATE: 18 BRPM | HEIGHT: 65 IN | WEIGHT: 139.22 LBS | SYSTOLIC BLOOD PRESSURE: 110 MMHG | BODY MASS INDEX: 23.2 KG/M2

## 2019-05-01 DIAGNOSIS — G47.00 INSOMNIA, UNSPECIFIED TYPE: ICD-10-CM

## 2019-05-01 DIAGNOSIS — G47.39 OTHER SLEEP APNEA: ICD-10-CM

## 2019-05-01 DIAGNOSIS — Z72.820 SLEEP DEPRIVATION: Primary | ICD-10-CM

## 2019-05-01 PROCEDURE — 99204 OFFICE O/P NEW MOD 45 MIN: CPT | Performed by: INTERNAL MEDICINE

## 2019-05-08 ENCOUNTER — APPOINTMENT (OUTPATIENT)
Dept: FAMILY MEDICINE | Age: 64
End: 2019-05-08

## 2019-05-13 DIAGNOSIS — Z96.641 STATUS POST TOTAL REPLACEMENT OF RIGHT HIP: Primary | ICD-10-CM

## 2019-05-13 RX ORDER — OXYCODONE HYDROCHLORIDE 5 MG/1
5-10 TABLET ORAL EVERY 8 HOURS PRN
Qty: 40 TABLET | Refills: 0 | Status: SHIPPED | OUTPATIENT
Start: 2019-05-13 | End: 2019-05-28 | Stop reason: ALTCHOICE

## 2019-05-28 ENCOUNTER — TELEPHONE (OUTPATIENT)
Dept: ORTHOPEDICS | Age: 64
End: 2019-05-28

## 2019-05-28 ENCOUNTER — OFFICE VISIT (OUTPATIENT)
Dept: ORTHOPEDICS | Age: 64
End: 2019-05-28

## 2019-05-28 ENCOUNTER — IMAGING SERVICES (OUTPATIENT)
Dept: GENERAL RADIOLOGY | Age: 64
End: 2019-05-28
Attending: PHYSICIAN ASSISTANT

## 2019-05-28 DIAGNOSIS — Z96.641 STATUS POST TOTAL REPLACEMENT OF RIGHT HIP: ICD-10-CM

## 2019-05-28 DIAGNOSIS — Z96.641 STATUS POST TOTAL REPLACEMENT OF RIGHT HIP: Primary | ICD-10-CM

## 2019-05-28 PROCEDURE — 73502 X-RAY EXAM HIP UNI 2-3 VIEWS: CPT | Performed by: RADIOLOGY

## 2019-05-28 PROCEDURE — 99024 POSTOP FOLLOW-UP VISIT: CPT | Performed by: ORTHOPAEDIC SURGERY

## 2019-05-28 RX ORDER — HYDROCODONE BITARTRATE AND ACETAMINOPHEN 5; 325 MG/1; MG/1
1 TABLET ORAL EVERY 6 HOURS PRN
Qty: 40 TABLET | Refills: 0 | Status: SHIPPED | OUTPATIENT
Start: 2019-05-28 | End: 2019-09-12 | Stop reason: ALTCHOICE

## 2019-08-28 ENCOUNTER — APPOINTMENT (OUTPATIENT)
Dept: GENERAL RADIOLOGY | Facility: CLINIC | Age: 64
End: 2019-08-28
Attending: EMERGENCY MEDICINE
Payer: MEDICARE

## 2019-08-28 ENCOUNTER — APPOINTMENT (OUTPATIENT)
Dept: CT IMAGING | Facility: CLINIC | Age: 64
End: 2019-08-28
Attending: EMERGENCY MEDICINE
Payer: MEDICARE

## 2019-08-28 ENCOUNTER — HOSPITAL ENCOUNTER (EMERGENCY)
Facility: CLINIC | Age: 64
Discharge: HOME | End: 2019-08-28
Attending: EMERGENCY MEDICINE | Admitting: EMERGENCY MEDICINE
Payer: MEDICARE

## 2019-08-28 VITALS
SYSTOLIC BLOOD PRESSURE: 123 MMHG | RESPIRATION RATE: 16 BRPM | OXYGEN SATURATION: 98 % | HEART RATE: 74 BPM | TEMPERATURE: 98 F | DIASTOLIC BLOOD PRESSURE: 64 MMHG

## 2019-08-28 DIAGNOSIS — S43.102A AC SEPARATION, LEFT, INITIAL ENCOUNTER: ICD-10-CM

## 2019-08-28 LAB
ABO + RH BLD: NORMAL
ABO + RH BLD: NORMAL
ANION GAP SERPL CALCULATED.3IONS-SCNC: 6 MMOL/L (ref 3–14)
APTT PPP: 33 SEC (ref 22–37)
BASOPHILS # BLD AUTO: 0 10E9/L (ref 0–0.2)
BASOPHILS NFR BLD AUTO: 0.4 %
BLD GP AB SCN SERPL QL: NORMAL
BLOOD BANK CMNT PATIENT-IMP: NORMAL
BUN SERPL-MCNC: 15 MG/DL (ref 7–30)
CALCIUM SERPL-MCNC: 8 MG/DL (ref 8.5–10.1)
CHLORIDE SERPL-SCNC: 116 MMOL/L (ref 94–109)
CO2 SERPL-SCNC: 23 MMOL/L (ref 20–32)
CREAT SERPL-MCNC: 0.87 MG/DL (ref 0.52–1.04)
DIFFERENTIAL METHOD BLD: ABNORMAL
EOSINOPHIL # BLD AUTO: 0.1 10E9/L (ref 0–0.7)
EOSINOPHIL NFR BLD AUTO: 0.8 %
ERYTHROCYTE [DISTWIDTH] IN BLOOD BY AUTOMATED COUNT: 13.1 % (ref 10–15)
ETHANOL SERPL-MCNC: <0.01 G/DL
GFR SERPL CREATININE-BSD FRML MDRD: 70 ML/MIN/{1.73_M2}
GLUCOSE SERPL-MCNC: 81 MG/DL (ref 70–99)
HCT VFR BLD AUTO: 33.4 % (ref 35–47)
HGB BLD-MCNC: 10.2 G/DL (ref 11.7–15.7)
IMM GRANULOCYTES # BLD: 0 10E9/L (ref 0–0.4)
IMM GRANULOCYTES NFR BLD: 0.4 %
INR PPP: 1.01 (ref 0.86–1.14)
LYMPHOCYTES # BLD AUTO: 1.5 10E9/L (ref 0.8–5.3)
LYMPHOCYTES NFR BLD AUTO: 21.5 %
MCH RBC QN AUTO: 31.9 PG (ref 26.5–33)
MCHC RBC AUTO-ENTMCNC: 30.5 G/DL (ref 31.5–36.5)
MCV RBC AUTO: 104 FL (ref 78–100)
MONOCYTES # BLD AUTO: 0.4 10E9/L (ref 0–1.3)
MONOCYTES NFR BLD AUTO: 5.2 %
NEUTROPHILS # BLD AUTO: 5.1 10E9/L (ref 1.6–8.3)
NEUTROPHILS NFR BLD AUTO: 71.7 %
NRBC # BLD AUTO: 0 10*3/UL
NRBC BLD AUTO-RTO: 0 /100
PLATELET # BLD AUTO: 316 10E9/L (ref 150–450)
POTASSIUM SERPL-SCNC: 3.7 MMOL/L (ref 3.4–5.3)
RBC # BLD AUTO: 3.2 10E12/L (ref 3.8–5.2)
SODIUM SERPL-SCNC: 145 MMOL/L (ref 133–144)
SPECIMEN EXP DATE BLD: NORMAL
WBC # BLD AUTO: 7.2 10E9/L (ref 4–11)

## 2019-08-28 PROCEDURE — 25000128 H RX IP 250 OP 636: Performed by: EMERGENCY MEDICINE

## 2019-08-28 PROCEDURE — 80320 DRUG SCREEN QUANTALCOHOLS: CPT | Performed by: EMERGENCY MEDICINE

## 2019-08-28 PROCEDURE — 70450 CT HEAD/BRAIN W/O DYE: CPT

## 2019-08-28 PROCEDURE — 80048 BASIC METABOLIC PNL TOTAL CA: CPT | Performed by: EMERGENCY MEDICINE

## 2019-08-28 PROCEDURE — 70486 CT MAXILLOFACIAL W/O DYE: CPT

## 2019-08-28 PROCEDURE — 90715 TDAP VACCINE 7 YRS/> IM: CPT | Performed by: EMERGENCY MEDICINE

## 2019-08-28 PROCEDURE — 71046 X-RAY EXAM CHEST 2 VIEWS: CPT

## 2019-08-28 PROCEDURE — 90471 IMMUNIZATION ADMIN: CPT | Performed by: EMERGENCY MEDICINE

## 2019-08-28 PROCEDURE — 73030 X-RAY EXAM OF SHOULDER: CPT | Mod: LT

## 2019-08-28 PROCEDURE — 85610 PROTHROMBIN TIME: CPT | Performed by: EMERGENCY MEDICINE

## 2019-08-28 PROCEDURE — 86901 BLOOD TYPING SEROLOGIC RH(D): CPT | Performed by: EMERGENCY MEDICINE

## 2019-08-28 PROCEDURE — 73050 X-RAY EXAM OF SHOULDERS: CPT

## 2019-08-28 PROCEDURE — 85025 COMPLETE CBC W/AUTO DIFF WBC: CPT | Performed by: EMERGENCY MEDICINE

## 2019-08-28 PROCEDURE — 96376 TX/PRO/DX INJ SAME DRUG ADON: CPT | Performed by: EMERGENCY MEDICINE

## 2019-08-28 PROCEDURE — 72125 CT NECK SPINE W/O DYE: CPT

## 2019-08-28 PROCEDURE — 99284 EMERGENCY DEPT VISIT MOD MDM: CPT | Mod: Z6 | Performed by: EMERGENCY MEDICINE

## 2019-08-28 PROCEDURE — 85730 THROMBOPLASTIN TIME PARTIAL: CPT | Performed by: EMERGENCY MEDICINE

## 2019-08-28 PROCEDURE — 86900 BLOOD TYPING SEROLOGIC ABO: CPT | Performed by: EMERGENCY MEDICINE

## 2019-08-28 PROCEDURE — 96374 THER/PROPH/DIAG INJ IV PUSH: CPT | Performed by: EMERGENCY MEDICINE

## 2019-08-28 PROCEDURE — 99285 EMERGENCY DEPT VISIT HI MDM: CPT | Mod: 25 | Performed by: EMERGENCY MEDICINE

## 2019-08-28 PROCEDURE — 86850 RBC ANTIBODY SCREEN: CPT | Performed by: EMERGENCY MEDICINE

## 2019-08-28 RX ORDER — NAPROXEN 500 MG/1
500 TABLET ORAL 2 TIMES DAILY WITH MEALS
Qty: 10 TABLET | Refills: 0 | Status: SHIPPED | OUTPATIENT
Start: 2019-08-28 | End: 2019-09-02

## 2019-08-28 RX ORDER — ACETAMINOPHEN 500 MG
1000 TABLET ORAL 3 TIMES DAILY
Qty: 42 TABLET | Refills: 0 | Status: SHIPPED | OUTPATIENT
Start: 2019-08-28 | End: 2019-09-04

## 2019-08-28 RX ORDER — MORPHINE SULFATE 2 MG/ML
2 INJECTION, SOLUTION INTRAMUSCULAR; INTRAVENOUS EVERY 30 MIN PRN
Status: DISCONTINUED | OUTPATIENT
Start: 2019-08-28 | End: 2019-08-28 | Stop reason: HOSPADM

## 2019-08-28 RX ORDER — OXYCODONE HYDROCHLORIDE 5 MG/1
5 TABLET ORAL EVERY 6 HOURS PRN
Qty: 12 TABLET | Refills: 0 | Status: SHIPPED | OUTPATIENT
Start: 2019-08-28

## 2019-08-28 RX ADMIN — MORPHINE SULFATE 2 MG: 2 INJECTION, SOLUTION INTRAMUSCULAR; INTRAVENOUS at 16:38

## 2019-08-28 RX ADMIN — MORPHINE SULFATE 2 MG: 2 INJECTION, SOLUTION INTRAMUSCULAR; INTRAVENOUS at 17:54

## 2019-08-28 RX ADMIN — CLOSTRIDIUM TETANI TOXOID ANTIGEN (FORMALDEHYDE INACTIVATED), CORYNEBACTERIUM DIPHTHERIAE TOXOID ANTIGEN (FORMALDEHYDE INACTIVATED), BORDETELLA PERTUSSIS TOXOID ANTIGEN (GLUTARALDEHYDE INACTIVATED), BORDETELLA PERTUSSIS FILAMENTOUS HEMAGGLUTININ ANTIGEN (FORMALDEHYDE INACTIVATED), BORDETELLA PERTUSSIS PERTACTIN ANTIGEN, AND BORDETELLA PERTUSSIS FIMBRIAE 2/3 ANTIGEN 0.5 ML: 5; 2; 2.5; 5; 3; 5 INJECTION, SUSPENSION INTRAMUSCULAR at 19:59

## 2019-08-28 ASSESSMENT — ENCOUNTER SYMPTOMS
VOMITING: 0
NAUSEA: 0
HEADACHES: 1
NECK PAIN: 0
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
## 2019-08-28 NOTE — ED AVS SNAPSHOT
[facility], [place], Emergency Department  [address]  [facility] [address]  Phone:  [phone]  Fax:  [phone]                                    Kamila Schwabe   MRN: [id number]    Department:  [facility], Emergency Department   Date of Visit:  8/28/2019           After Visit Summary Signature Page    I have received my discharge instructions, and my questions have been answered. I have discussed any challenges I see with this plan with the nurse or doctor.    ..........................................................................................................................................  Patient/Patient Representative Signature      ..........................................................................................................................................  Patient Representative Print Name and Relationship to Patient    ..................................................               ................................................  Date                                   Time    ..........................................................................................................................................  Reviewed by Signature/Title    ...................................................              ..............................................  Date                                               Time          22EPIC Rev 08/18

## 2019-08-29 ENCOUNTER — TELEPHONE (OUTPATIENT)
Dept: ORTHOPEDICS | Facility: CLINIC | Age: 64
End: 2019-08-29
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
## 2019-08-29 NOTE — TELEPHONE ENCOUNTER
Called pt to schedule an appt and left clinic number to schedule.      ----- Message from Javed Castro ATC sent at 8/29/2019  9:47 AM CDT -----      ----- Message -----  From: Sukhdev Sebastian RN  Sent: 8/29/2019   9:40 AM  To: [facility]    Please schedule patient to see Sports Medicine for non-surgical left AC separation Grade 3 per Dr. Baker.    Thank you    DIOGO Sandoval

## 2019-08-31 ENCOUNTER — OFFICE VISIT (OUTPATIENT)
Dept: ORTHOPEDICS | Facility: CLINIC | Age: 64
End: 2019-08-31
Payer: MEDICAID

## 2019-08-31 VITALS
SYSTOLIC BLOOD PRESSURE: 123 MMHG | HEART RATE: 74 BPM | BODY MASS INDEX: 23.32 KG/M2 | WEIGHT: 140 LBS | HEIGHT: 65 IN | DIASTOLIC BLOOD PRESSURE: 64 MMHG

## 2019-08-31 DIAGNOSIS — S43.52XA SPRAIN OF ACROMIOCLAVICULAR JOINT, LEFT, INITIAL ENCOUNTER: Primary | ICD-10-CM

## 2019-08-31 PROBLEM — G62.9 NEUROPATHY: Status: ACTIVE | Noted: 2019-08-31

## 2019-08-31 PROBLEM — M16.11 OSTEOARTHRITIS OF RIGHT HIP: Status: ACTIVE | Noted: 2019-04-16

## 2019-08-31 PROBLEM — I10 HYPERTENSION: Status: ACTIVE | Noted: 2019-08-31

## 2019-08-31 PROBLEM — G47.39 OTHER SLEEP APNEA: Status: ACTIVE | Noted: 2019-03-14

## 2019-08-31 PROBLEM — R25.1 OCCASIONAL TREMORS: Status: ACTIVE | Noted: 2019-08-31

## 2019-08-31 PROBLEM — F31.9 BIPOLAR AFFECTIVE DISORDER (H): Status: ACTIVE | Noted: 2019-08-31

## 2019-08-31 PROBLEM — F32.A ANXIETY AND DEPRESSION: Status: ACTIVE | Noted: 2019-03-14

## 2019-08-31 PROBLEM — F41.9 ANXIETY AND DEPRESSION: Status: ACTIVE | Noted: 2019-03-14

## 2019-08-31 PROBLEM — M51.369 DDD (DEGENERATIVE DISC DISEASE), LUMBAR: Status: ACTIVE | Noted: 2018-05-11

## 2019-08-31 PROBLEM — D53.9 MACROCYTIC ANEMIA: Status: ACTIVE | Noted: 2019-03-15

## 2019-08-31 PROBLEM — G47.00 INSOMNIA: Status: ACTIVE | Noted: 2019-08-31

## 2019-08-31 PROBLEM — K21.9 GERD (GASTROESOPHAGEAL REFLUX DISEASE): Status: ACTIVE | Noted: 2019-08-31

## 2019-08-31 PROBLEM — Z72.820 SLEEP DEPRIVATION: Status: ACTIVE | Noted: 2019-03-14

## 2019-08-31 PROBLEM — M54.16 LUMBAR RADICULOPATHY: Status: ACTIVE | Noted: 2018-05-11

## 2019-08-31 RX ORDER — FLUTICASONE PROPIONATE AND SALMETEROL XINAFOATE 115; 21 UG/1; UG/1
2 AEROSOL, METERED RESPIRATORY (INHALATION) 2 TIMES DAILY
COMMUNITY
Start: 2019-05-04

## 2019-08-31 RX ORDER — VERAPAMIL HYDROCHLORIDE 360 MG/1
360 CAPSULE, DELAYED RELEASE PELLETS ORAL DAILY
COMMUNITY
Start: 2016-10-20

## 2019-08-31 RX ORDER — TRAZODONE HYDROCHLORIDE 100 MG/1
200 TABLET ORAL AT BEDTIME
COMMUNITY

## 2019-08-31 RX ORDER — FUROSEMIDE 80 MG
80 TABLET ORAL DAILY
COMMUNITY
Start: 2019-03-18

## 2019-08-31 RX ORDER — ESOMEPRAZOLE MAGNESIUM 40 MG/1
40 CAPSULE, DELAYED RELEASE ORAL DAILY
COMMUNITY

## 2019-08-31 RX ORDER — VITAMIN E 268 MG
1200 CAPSULE ORAL DAILY
COMMUNITY

## 2019-08-31 RX ORDER — DULOXETIN HYDROCHLORIDE 60 MG/1
60 CAPSULE, DELAYED RELEASE ORAL 2 TIMES DAILY
COMMUNITY
Start: 2013-10-17

## 2019-08-31 RX ORDER — IBUPROFEN 800 MG/1
800 TABLET, FILM COATED ORAL EVERY 8 HOURS PRN
Qty: 30 TABLET | Refills: 0 | Status: SHIPPED | OUTPATIENT
Start: 2019-08-31

## 2019-08-31 RX ORDER — MODAFINIL 200 MG/1
200 TABLET ORAL DAILY
COMMUNITY
Start: 2019-03-18

## 2019-08-31 RX ORDER — HYDROCODONE BITARTRATE AND ACETAMINOPHEN 5; 325 MG/1; MG/1
1 TABLET ORAL EVERY 6 HOURS PRN
Qty: 15 TABLET | Refills: 0 | Status: SHIPPED | OUTPATIENT
Start: 2019-08-31

## 2019-08-31 RX ORDER — LEVOTHYROXINE SODIUM 50 UG/1
50 TABLET ORAL DAILY
COMMUNITY

## 2019-08-31 RX ORDER — BUPROPION HYDROCHLORIDE 150 MG/1
450 TABLET ORAL DAILY
COMMUNITY

## 2019-08-31 RX ORDER — FERROUS SULFATE 325(65) MG
325 TABLET ORAL 3 TIMES DAILY
COMMUNITY
Start: 2013-10-17

## 2019-08-31 ASSESSMENT — MIFFLIN-ST. JEOR: SCORE: 1180.92

## 2019-08-31 NOTE — LETTER
Date:September 3, 2019      Patient was self referred, no letter generated. Do not send.        [facility] Health Information
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
## 2019-08-31 NOTE — PROGRESS NOTES
SPORTS & ORTHOPEDIC WALK-IN VISIT 8/31/2019    Primary Care Physician:  ***    Reason for visit:     What part of your body is injured / painful?  {LOCATION:448793}    What caused the injury /pain? {DS INJURY CAUSE:219085}    How long ago did your injury occur or pain begin? {DOI:802348}    What are your most bothersome symptoms? {SYMPTOMS:629104}    How would you characterize your symptom?  {PAIN ASSESSMENT:329821}    What makes your symptoms better? {SX BETTER:174690}    What makes your symptoms worse? {SX WORSE:637238}    Have you been previously seen for this problem? {YES/NO:938952}    Medical History:    Any recent changes to your medical history? {YES/NO:497324}    Any new medication prescribed since last visit? {YES/NO:102312}    Have you had surgery on this body part before? {YES/NO SX:874668}    Social History:    Occupation: ***    Handedness: {HANDDOMINANCE:874985}    Exercise: {EXERCISE TYPE:268390}    Review of Systems:    Do you have fever, chills, weight loss? {YES/NO:760130}    Do you have any vision problems? {YES/NO:458487}    Do you have any chest pain or edema? {YES/NO:999421}    Do you have any shortness of breath or wheezing?  {YES/NO:257241}    Do you have stomach problems? {YES/NO:641803}    Do you have any numbness or focal weakness? {YES/NO:497956}    Do you have diabetes? {YES/NO:927840}    Do you have problems with bleeding or clotting? {YES/NO:046377}    Do you have an rashes or other skin lesions? {YES/NO:653208}
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
## 2019-08-31 NOTE — PATIENT INSTRUCTIONS
Sling for two weeks  Ibuprofen TID  Norco for breakthrough pain  Ice 15-20 minutes every 2-3 hours  Start basic exercises as tolerated in 2 weeks  Follow up with practitioner in 4 weeks  Begin therapy in 4 weeks (below)    Wand exercise, flexion: Stand upright and hold a stick in both hands, palms down. Stretch your arms by lifting them over your head, keeping your arms straight. Hold for 5 seconds and return to the starting position. Repeat 10 times.  Wand exercise, extension: Stand upright and hold a stick in both hands behind your back. Move the stick away from your back. Hold this position for 5 seconds. Relax and return to the starting position. Repeat 10 times.    Wand exercise, external rotation: Lie on your back and hold a stick in both hands, palms up. Your upper arms should be resting on the floor with your elbows at your sides and bent 90 degrees. Use your uninjured arm to push your injured arm out away from your body. Keep the elbow of your injured arm at your side while it is being pushed. Hold the stretch for 5 seconds. Repeat 10 times.    Wand exercise, internal rotation: Stand with your uninjured arm behind your head holding the end of a stick. Put your injured arm behind your back at your waist and grab the stick. Pull the stick up behind your back by straightening the elbow of your uninjured arm and bending the elbow of your injured arm. Hold this position for 5 seconds and then go back to the starting position. Repeat 10 times.    Wand exercise, shoulder abduction and adduction: Stand and hold a stick with both hands, palms facing away from your body. Rest the stick against the front of your thighs. Use your uninjured arm to push your injured arm out to the side and up as high as possible. Keep your arms straight. Hold for 5 seconds. Repeat 10 times.    Wand exercise, horizontal abduction and adduction: Stand and hold a stick in both hands. Stretch your arms straight out in front of you at  shoulder height. Keeping your arms straight, swing the stick to one side. Feel the stretch and hold for 5 seconds. Then swing the stick to the other side, feel the stretch, and hold for 5 seconds. Repeat 10 times.    Shoulder flexion: Stand with your arms hanging down at your sides. Keep your arms straight and lift them in front of you and up over your head as far as you can reach. Hold this position for 5 seconds and then bring your arms back down in front of you and to your sides. Do 2 sets of 15.    Shoulder abduction: Stand with your arms at your sides. Bring your arms up, out to the side, and toward the ceiling. Hold for 5 seconds. Return to the starting position. Repeat 10 times.    Horizontal shoulder abduction: Stand with your arms held straight out in front of you at shoulder height. Pull your arms apart and out to the sides as far as possible. Hold your arms back for 5 seconds, then bring them back together in front of you. Repeat 10 times. Remember to keep your arms at shoulder height throughout the exercise.  Shoulder extension: Stand with your arms at your sides. Move the arm on your injured side back, keeping the arm straight. Hold this position for 5 seconds. Return to the starting position and repeat 10 times.    Scapular active range of motion: Stand and shrug your shoulders up and hold for 5 seconds. Then squeeze your shoulder blades back and together and hold 5 seconds. Next, pull your shoulder blades downward as if putting them in your back pocket. Relax. Repeat this sequence 10 times.    Side-lying horizontal abduction: Lie on your uninjured side with the arm on your injured side relaxed across your chest. Slowly bring this arm up off the floor so that your hand is pointing toward the ceiling. Keep your arm straight as you do this. Do 2 sets of 15. Hold a weight in your hand as the exercise becomes easier.    Prone shoulder extension: Lie on your stomach on a table or the edge of a bed with the  arm on your injured side hanging down over the edge. Slowly lift your arm straight back and toward the ceiling. Do not bend your elbow. Return to the starting position. Do 2 sets of 15. As this becomes easier, hold a weight in your hand.    Single-arm shoulder abduction: Stand with your arms at your sides, your palms resting against your sides. Lift the arm on your injured side out to the side and toward the ceiling. Keep your arm straight. Hold the position for 5 seconds and then bring your arm back to your side. Repeat 10 times. Add a weight to your hand as the exercise gets easier.    Resisted shoulder internal rotation: Stand sideways next to a door with your injured arm closest to the door. Tie a knot in the end of the tubing and shut the knot in the door at waist level. Hold the other end of the tubing with the hand of your injured arm. Bend the elbow of your injured arm 90 degrees. Keeping your elbow in at your side, rotate your forearm across your body and then slowly back to the starting position. Make sure you keep your forearm parallel to the floor. Do 2 sets of 8 to 12.    Resisted shoulder adduction: Stand sideways next to a door with your injured side closer to the door. Stand about a shoulder's length from the door. Tie a knot in the end of the tubing and shut the knot in the door at shoulder height. Hold the tubing with the hand on your injured side at shoulder height. Slowly pull the tubing down toward your side. Do 2 sets of 15.    Resisted shoulder flexion: Holding tubing connected to a door knob at waist level, face away from the door, keep your elbow straight and pull your arm forward. Do 2 sets of 15.  Resisted shoulder extension: Stand facing a door. Tie a knot in the end of the tubing and shut the knot in the door at shoulder height. Use the hand on your injured side to hold the tubing at shoulder height. Pull your arm back, keeping your arm straight. Do 2 sets of 15.    Resisted shoulder  external rotation: Stand sideways next to a door with your injured arm farther from the door. Tie a knot in the end of the tubing and shut the knot in the door at waist level. Hold the other end of the tubing with the hand of your injured arm. Rest the hand of your injured arm across your stomach. Keeping your elbow in at your side, rotate your arm outward and away from your waist. Slowly return your arm to the starting position. Make sure you keep your elbow bent 90 degrees and your forearm parallel to the floor. Repeat 10 times. Build up to 2 sets of 15.    Developed by [employer].  Published by [employer].  Copyright  2014 [employer] and/or one of its subsidiaries. All rights reserved.

## 2019-09-11 ENCOUNTER — TELEPHONE (OUTPATIENT)
Dept: ORTHOPEDICS | Age: 64
End: 2019-09-11

## 2019-09-12 ENCOUNTER — IMAGING SERVICES (OUTPATIENT)
Dept: GENERAL RADIOLOGY | Age: 64
End: 2019-09-12
Attending: ORTHOPAEDIC SURGERY

## 2019-09-12 ENCOUNTER — OFFICE VISIT (OUTPATIENT)
Dept: ORTHOPEDICS | Age: 64
End: 2019-09-12

## 2019-09-12 VITALS — HEIGHT: 65 IN | TEMPERATURE: 98 F | BODY MASS INDEX: 23.53 KG/M2 | WEIGHT: 141.2 LBS

## 2019-09-12 DIAGNOSIS — S43.102A CLOSED DISLOCATION OF LEFT ACROMIOCLAVICULAR JOINT, INITIAL ENCOUNTER: Primary | ICD-10-CM

## 2019-09-12 DIAGNOSIS — S43.102A CLOSED DISLOCATION OF LEFT ACROMIOCLAVICULAR JOINT, INITIAL ENCOUNTER: ICD-10-CM

## 2019-09-12 DIAGNOSIS — S43.005D SHOULDER DISLOCATION, LEFT, SUBSEQUENT ENCOUNTER: ICD-10-CM

## 2019-09-12 DIAGNOSIS — S43.005D SHOULDER DISLOCATION, LEFT, SUBSEQUENT ENCOUNTER: Primary | ICD-10-CM

## 2019-09-12 PROCEDURE — 73050 X-RAY EXAM OF SHOULDERS: CPT | Performed by: RADIOLOGY

## 2019-09-12 PROCEDURE — 73030 X-RAY EXAM OF SHOULDER: CPT | Performed by: RADIOLOGY

## 2019-09-12 PROCEDURE — 99214 OFFICE O/P EST MOD 30 MIN: CPT | Performed by: ORTHOPAEDIC SURGERY

## 2019-09-12 RX ORDER — HYDROCODONE BITARTRATE AND ACETAMINOPHEN 5; 325 MG/1; MG/1
1 TABLET ORAL EVERY 6 HOURS PRN
Qty: 40 TABLET | Refills: 0 | Status: SHIPPED | OUTPATIENT
Start: 2019-09-12 | End: 2019-10-07 | Stop reason: SDUPTHER

## 2019-10-07 RX ORDER — HYDROCODONE BITARTRATE AND ACETAMINOPHEN 5; 325 MG/1; MG/1
1 TABLET ORAL EVERY 8 HOURS PRN
Qty: 30 TABLET | Refills: 0 | Status: SHIPPED | OUTPATIENT
Start: 2019-10-07 | End: 2020-03-03 | Stop reason: ALTCHOICE

## 2019-10-10 DIAGNOSIS — Z96.641 STATUS POST TOTAL REPLACEMENT OF RIGHT HIP: Primary | ICD-10-CM

## 2019-10-14 ENCOUNTER — IMAGING SERVICES (OUTPATIENT)
Dept: GENERAL RADIOLOGY | Age: 64
End: 2019-10-14
Attending: ORTHOPAEDIC SURGERY

## 2019-10-14 ENCOUNTER — OFFICE VISIT (OUTPATIENT)
Dept: ORTHOPEDICS | Age: 64
End: 2019-10-14

## 2019-10-14 DIAGNOSIS — S43.102D CLOSED DISLOCATION OF LEFT ACROMIOCLAVICULAR JOINT, SUBSEQUENT ENCOUNTER: ICD-10-CM

## 2019-10-14 DIAGNOSIS — Z96.641 STATUS POST TOTAL REPLACEMENT OF RIGHT HIP: ICD-10-CM

## 2019-10-14 DIAGNOSIS — Z96.641 STATUS POST TOTAL REPLACEMENT OF RIGHT HIP: Primary | ICD-10-CM

## 2019-10-14 PROCEDURE — 99213 OFFICE O/P EST LOW 20 MIN: CPT | Performed by: ORTHOPAEDIC SURGERY

## 2019-10-14 PROCEDURE — 73502 X-RAY EXAM HIP UNI 2-3 VIEWS: CPT | Performed by: RADIOLOGY

## 2019-11-05 ENCOUNTER — OFFICE VISIT (OUTPATIENT)
Dept: ORTHOPEDICS | Age: 64
End: 2019-11-05

## 2019-11-05 ENCOUNTER — IMAGING SERVICES (OUTPATIENT)
Dept: GENERAL RADIOLOGY | Age: 64
End: 2019-11-05
Attending: ORTHOPAEDIC SURGERY

## 2019-11-05 DIAGNOSIS — M16.12 PRIMARY OSTEOARTHRITIS OF LEFT HIP: Primary | ICD-10-CM

## 2019-11-05 DIAGNOSIS — M25.552 LEFT HIP PAIN: ICD-10-CM

## 2019-11-05 DIAGNOSIS — M25.552 LEFT HIP PAIN: Primary | ICD-10-CM

## 2019-11-05 PROCEDURE — 73502 X-RAY EXAM HIP UNI 2-3 VIEWS: CPT | Performed by: RADIOLOGY

## 2019-11-05 PROCEDURE — 99213 OFFICE O/P EST LOW 20 MIN: CPT | Performed by: PHYSICIAN ASSISTANT

## 2019-11-05 SDOH — HEALTH STABILITY: MENTAL HEALTH: HOW MANY STANDARD DRINKS CONTAINING ALCOHOL DO YOU HAVE ON A TYPICAL DAY?: 1 OR 2

## 2019-11-05 SDOH — HEALTH STABILITY: MENTAL HEALTH: HOW OFTEN DO YOU HAVE 6 OR MORE DRINKS ON ONE OCCASION?: NEVER

## 2019-11-05 SDOH — HEALTH STABILITY: MENTAL HEALTH: HOW OFTEN DO YOU HAVE A DRINK CONTAINING ALCOHOL?: MONTHLY OR LESS

## 2019-11-15 ENCOUNTER — HOSPITAL ENCOUNTER (OUTPATIENT)
Dept: GENERAL RADIOLOGY | Age: 64
Discharge: HOME | End: 2019-11-15
Attending: PHYSICIAN ASSISTANT

## 2019-11-15 DIAGNOSIS — M16.12 PRIMARY OSTEOARTHRITIS OF LEFT HIP: ICD-10-CM

## 2019-11-15 PROCEDURE — 20610 DRAIN/INJ JOINT/BURSA W/O US: CPT | Performed by: PHYSICIAN ASSISTANT

## 2019-11-15 PROCEDURE — 20610 DRAIN/INJ JOINT/BURSA W/O US: CPT

## 2019-11-15 PROCEDURE — 77002 NEEDLE LOCALIZATION BY XRAY: CPT | Performed by: PHYSICIAN ASSISTANT

## 2019-11-15 PROCEDURE — 10002805 HB CONTRAST AGENT: Performed by: PHYSICIAN ASSISTANT

## 2019-11-15 PROCEDURE — 10002801 HB RX 250 W/O HCPCS: Performed by: PHYSICIAN ASSISTANT

## 2019-11-15 RX ORDER — LIDOCAINE HYDROCHLORIDE 10 MG/ML
5 INJECTION, SOLUTION EPIDURAL; INFILTRATION; INTRACAUDAL; PERINEURAL ONCE
Status: COMPLETED | OUTPATIENT
Start: 2019-11-15 | End: 2019-11-15

## 2019-11-15 RX ORDER — METHYLPREDNISOLONE ACETATE 40 MG/ML
40 INJECTION, SUSPENSION INTRA-ARTICULAR; INTRALESIONAL; INTRAMUSCULAR; SOFT TISSUE ONCE
Status: COMPLETED | OUTPATIENT
Start: 2019-11-15 | End: 2019-11-15

## 2019-11-15 RX ORDER — LIDOCAINE HYDROCHLORIDE 10 MG/ML
20 INJECTION, SOLUTION INFILTRATION; PERINEURAL ONCE
Status: COMPLETED | OUTPATIENT
Start: 2019-11-15 | End: 2019-11-15

## 2019-11-15 RX ADMIN — METHYLPREDNISOLONE ACETATE 40 MG: 40 INJECTION, SUSPENSION INTRA-ARTICULAR; INTRALESIONAL; INTRAMUSCULAR; SOFT TISSUE at 15:10

## 2019-11-15 RX ADMIN — LIDOCAINE HYDROCHLORIDE 4 ML: 10 INJECTION, SOLUTION INFILTRATION; PERINEURAL at 15:10

## 2019-11-15 RX ADMIN — LIDOCAINE HYDROCHLORIDE 50 MG: 10 INJECTION, SOLUTION EPIDURAL; INFILTRATION; INTRACAUDAL; PERINEURAL at 15:08

## 2019-11-15 RX ADMIN — IOPAMIDOL 2 ML: 612 INJECTION, SOLUTION INTRAVENOUS at 15:09

## 2020-03-03 ENCOUNTER — PREP FOR CASE (OUTPATIENT)
Dept: ORTHOPEDICS | Age: 65
End: 2020-03-03

## 2020-03-03 ENCOUNTER — OFFICE VISIT (OUTPATIENT)
Dept: ORTHOPEDICS | Age: 65
End: 2020-03-03

## 2020-03-03 DIAGNOSIS — M16.12 PRIMARY OSTEOARTHRITIS OF LEFT HIP: Primary | ICD-10-CM

## 2020-03-03 PROCEDURE — 99213 OFFICE O/P EST LOW 20 MIN: CPT | Performed by: PHYSICIAN ASSISTANT

## 2020-03-03 SDOH — HEALTH STABILITY: MENTAL HEALTH: HOW MANY STANDARD DRINKS CONTAINING ALCOHOL DO YOU HAVE ON A TYPICAL DAY?: 1 OR 2

## 2020-03-03 SDOH — HEALTH STABILITY: MENTAL HEALTH: HOW OFTEN DO YOU HAVE A DRINK CONTAINING ALCOHOL?: MONTHLY OR LESS

## 2020-03-03 SDOH — HEALTH STABILITY: MENTAL HEALTH: HOW OFTEN DO YOU HAVE 6 OR MORE DRINKS ON ONE OCCASION?: NEVER

## 2020-03-13 ENCOUNTER — HOSPITAL ENCOUNTER (OUTPATIENT)
Age: 65
Setting detail: SURGERY ADMIT
End: 2020-03-13
Attending: ORTHOPAEDIC SURGERY | Admitting: ORTHOPAEDIC SURGERY

## 2020-03-13 DIAGNOSIS — Z01.812 PRE-PROCEDURE LAB EXAM: ICD-10-CM

## 2020-03-13 DIAGNOSIS — M16.12 PRIMARY OSTEOARTHRITIS OF LEFT HIP: Primary | ICD-10-CM

## 2020-04-13 ENCOUNTER — APPOINTMENT (OUTPATIENT)
Dept: ORTHOPEDICS | Age: 65
End: 2020-04-13

## 2020-04-14 ENCOUNTER — APPOINTMENT (OUTPATIENT)
Dept: REHABILITATION | Age: 65
End: 2020-04-14
Attending: ORTHOPAEDIC SURGERY

## 2020-05-05 ENCOUNTER — APPOINTMENT (OUTPATIENT)
Dept: ORTHOPEDICS | Age: 65
End: 2020-05-05

## 2020-05-15 ENCOUNTER — TELEPHONE (OUTPATIENT)
Dept: FAMILY MEDICINE | Age: 65
End: 2020-05-15

## 2020-05-15 DIAGNOSIS — Z01.812 PRE-PROCEDURAL LABORATORY EXAMINATION: Primary | ICD-10-CM

## 2020-05-18 ENCOUNTER — TELEPHONE (OUTPATIENT)
Dept: ORTHOPEDICS | Age: 65
End: 2020-05-18

## 2020-05-19 ENCOUNTER — LAB SERVICES (OUTPATIENT)
Dept: LAB | Age: 65
End: 2020-05-19

## 2020-05-19 ENCOUNTER — OFFICE VISIT (OUTPATIENT)
Dept: FAMILY MEDICINE | Age: 65
End: 2020-05-19

## 2020-05-19 VITALS
HEART RATE: 59 BPM | HEIGHT: 65 IN | SYSTOLIC BLOOD PRESSURE: 103 MMHG | DIASTOLIC BLOOD PRESSURE: 60 MMHG | BODY MASS INDEX: 24.49 KG/M2 | WEIGHT: 147 LBS

## 2020-05-19 DIAGNOSIS — Z76.89 ENCOUNTER TO ESTABLISH CARE: Primary | ICD-10-CM

## 2020-05-19 DIAGNOSIS — I10 ESSENTIAL HYPERTENSION: ICD-10-CM

## 2020-05-19 DIAGNOSIS — F32.A ANXIETY AND DEPRESSION: ICD-10-CM

## 2020-05-19 DIAGNOSIS — Z72.820 SLEEP DEPRIVATION: ICD-10-CM

## 2020-05-19 DIAGNOSIS — Z86.39 HISTORY OF HYPOKALEMIA: ICD-10-CM

## 2020-05-19 DIAGNOSIS — M79.89 LEG SWELLING: ICD-10-CM

## 2020-05-19 DIAGNOSIS — Z01.818 PRE-OPERATIVE CLEARANCE: ICD-10-CM

## 2020-05-19 DIAGNOSIS — F41.9 ANXIETY AND DEPRESSION: ICD-10-CM

## 2020-05-19 DIAGNOSIS — G47.39 OTHER SLEEP APNEA: ICD-10-CM

## 2020-05-19 LAB
ALBUMIN SERPL-MCNC: 3.8 G/DL (ref 3.6–5.1)
ALBUMIN/GLOB SERPL: 1.5 {RATIO} (ref 1–2.4)
ALP SERPL-CCNC: 97 UNITS/L (ref 45–117)
ALT SERPL-CCNC: 18 UNITS/L
ANION GAP SERPL CALC-SCNC: 12 MMOL/L (ref 10–20)
AST SERPL-CCNC: 10 UNITS/L
BASOPHILS # BLD: 0 K/MCL (ref 0–0.3)
BASOPHILS NFR BLD: 1 %
BILIRUB SERPL-MCNC: 0.3 MG/DL (ref 0.2–1)
BUN SERPL-MCNC: 39 MG/DL (ref 6–20)
BUN/CREAT SERPL: 29 (ref 7–25)
CALCIUM SERPL-MCNC: 8.4 MG/DL (ref 8.4–10.2)
CHLORIDE SERPL-SCNC: 114 MMOL/L (ref 98–107)
CO2 SERPL-SCNC: 22 MMOL/L (ref 21–32)
CREAT SERPL-MCNC: 1.35 MG/DL (ref 0.51–0.95)
DEPRECATED RDW RBC: 48.7 FL (ref 39–50)
EOSINOPHIL # BLD: 0.1 K/MCL (ref 0.1–0.5)
EOSINOPHIL NFR BLD: 2 %
ERYTHROCYTE [DISTWIDTH] IN BLOOD: 12.8 % (ref 11–15)
FASTING DURATION TIME PATIENT: ABNORMAL H
GFR SERPLBLD BASED ON 1.73 SQ M-ARVRAT: 41 ML/MIN
GLOBULIN SER-MCNC: 2.6 G/DL (ref 2–4)
GLUCOSE SERPL-MCNC: 82 MG/DL (ref 65–99)
HCT VFR BLD CALC: 38.7 % (ref 36–46.5)
HGB BLD-MCNC: 12.3 G/DL (ref 12–15.5)
IMM GRANULOCYTES # BLD AUTO: 0 K/MCL (ref 0–0.2)
IMM GRANULOCYTES # BLD: 0 %
LYMPHOCYTES # BLD: 2.2 K/MCL (ref 1–4)
LYMPHOCYTES NFR BLD: 45 %
MCH RBC QN AUTO: 33.2 PG (ref 26–34)
MCHC RBC AUTO-ENTMCNC: 31.8 G/DL (ref 32–36.5)
MCV RBC AUTO: 104.3 FL (ref 78–100)
MONOCYTES # BLD: 0.4 K/MCL (ref 0.3–0.9)
MONOCYTES NFR BLD: 7 %
NEUTROPHILS # BLD: 2.2 K/MCL (ref 1.8–7.7)
NEUTROPHILS NFR BLD: 45 %
NRBC BLD MANUAL-RTO: 0 /100 WBC
PLATELET # BLD AUTO: 182 K/MCL (ref 140–450)
POTASSIUM SERPL-SCNC: 4 MMOL/L (ref 3.4–5.1)
PROT SERPL-MCNC: 6.4 G/DL (ref 6.4–8.2)
RBC # BLD: 3.71 MIL/MCL (ref 4–5.2)
SODIUM SERPL-SCNC: 144 MMOL/L (ref 135–145)
WBC # BLD: 4.9 K/MCL (ref 4.2–11)

## 2020-05-19 PROCEDURE — 99214 OFFICE O/P EST MOD 30 MIN: CPT | Performed by: FAMILY MEDICINE

## 2020-05-19 PROCEDURE — 87641 MR-STAPH DNA AMP PROBE: CPT | Performed by: CLINICAL MEDICAL LABORATORY

## 2020-05-19 PROCEDURE — 93000 ELECTROCARDIOGRAM COMPLETE: CPT | Performed by: FAMILY MEDICINE

## 2020-05-19 PROCEDURE — 80053 COMPREHEN METABOLIC PANEL: CPT | Performed by: CLINICAL MEDICAL LABORATORY

## 2020-05-19 PROCEDURE — 87640 STAPH A DNA AMP PROBE: CPT | Performed by: CLINICAL MEDICAL LABORATORY

## 2020-05-19 PROCEDURE — 85025 COMPLETE CBC W/AUTO DIFF WBC: CPT | Performed by: CLINICAL MEDICAL LABORATORY

## 2020-05-19 RX ORDER — MODAFINIL 200 MG/1
200 TABLET ORAL 2 TIMES DAILY
Qty: 60 TABLET | Refills: 2 | Status: SHIPPED | OUTPATIENT
Start: 2020-05-19 | End: 2020-08-12 | Stop reason: SDUPTHER

## 2020-05-19 RX ORDER — FERROUS SULFATE 325(65) MG
325 TABLET ORAL 3 TIMES DAILY
Qty: 270 TABLET | Refills: 1 | Status: ON HOLD | OUTPATIENT
Start: 2020-05-19 | End: 2023-11-09

## 2020-05-19 RX ORDER — ESOMEPRAZOLE MAGNESIUM 40 MG/1
40 CAPSULE, DELAYED RELEASE ORAL
Qty: 90 CAPSULE | Refills: 1 | Status: SHIPPED | OUTPATIENT
Start: 2020-05-19 | End: 2021-04-05 | Stop reason: SDUPTHER

## 2020-05-19 RX ORDER — FUROSEMIDE 80 MG
80 TABLET ORAL DAILY
Qty: 90 TABLET | Refills: 1 | Status: SHIPPED | OUTPATIENT
Start: 2020-05-19 | End: 2021-04-05 | Stop reason: SDUPTHER

## 2020-05-19 RX ORDER — QUINAPRIL 40 MG/1
40 TABLET ORAL DAILY
Qty: 90 TABLET | Refills: 1 | Status: SHIPPED | OUTPATIENT
Start: 2020-05-19 | End: 2020-09-15 | Stop reason: DRUGHIGH

## 2020-05-19 RX ORDER — BUPROPION HYDROCHLORIDE 150 MG/1
450 TABLET ORAL DAILY
Qty: 90 TABLET | Refills: 1 | Status: SHIPPED | OUTPATIENT
Start: 2020-05-19 | End: 2020-11-11

## 2020-05-19 RX ORDER — POTASSIUM CHLORIDE 1500 MG/1
20 TABLET, EXTENDED RELEASE ORAL DAILY
Qty: 90 TABLET | Refills: 1 | Status: SHIPPED | OUTPATIENT
Start: 2020-05-19 | End: 2021-08-18 | Stop reason: SDUPTHER

## 2020-05-19 RX ORDER — LEVOTHYROXINE SODIUM 0.05 MG/1
50 TABLET ORAL DAILY
Qty: 90 TABLET | Refills: 1 | Status: SHIPPED | OUTPATIENT
Start: 2020-05-19 | End: 2021-01-14

## 2020-05-19 RX ORDER — VERAPAMIL HYDROCHLORIDE 360 MG/1
360 CAPSULE, DELAYED RELEASE PELLETS ORAL DAILY
Qty: 90 CAPSULE | Refills: 1 | Status: SHIPPED | OUTPATIENT
Start: 2020-05-19 | End: 2020-05-29 | Stop reason: ALTCHOICE

## 2020-05-19 RX ORDER — TRAZODONE HYDROCHLORIDE 100 MG/1
200 TABLET ORAL NIGHTLY
Qty: 180 TABLET | Refills: 1 | Status: SHIPPED | OUTPATIENT
Start: 2020-05-19 | End: 2021-08-18 | Stop reason: ALTCHOICE

## 2020-05-19 RX ORDER — DULOXETIN HYDROCHLORIDE 60 MG/1
60 CAPSULE, DELAYED RELEASE ORAL 2 TIMES DAILY
Qty: 180 CAPSULE | Refills: 1 | Status: ON HOLD | OUTPATIENT
Start: 2020-05-19 | End: 2020-09-03

## 2020-05-19 RX ORDER — PREGABALIN 100 MG/1
100 CAPSULE ORAL 2 TIMES DAILY
Qty: 60 CAPSULE | Refills: 2 | Status: SHIPPED | OUTPATIENT
Start: 2020-05-19 | End: 2020-07-13 | Stop reason: ALTCHOICE

## 2020-05-19 RX ORDER — SERTRALINE HYDROCHLORIDE 100 MG/1
100 TABLET, FILM COATED ORAL DAILY
Qty: 90 TABLET | Refills: 1 | Status: SHIPPED | OUTPATIENT
Start: 2020-05-19 | End: 2021-04-02 | Stop reason: SDUPTHER

## 2020-05-20 ENCOUNTER — TELEPHONE (OUTPATIENT)
Dept: FAMILY MEDICINE | Age: 65
End: 2020-05-20

## 2020-05-20 DIAGNOSIS — R79.89 ELEVATED SERUM CREATININE: Primary | ICD-10-CM

## 2020-05-20 LAB
MRSA DNA SPEC QL NAA+PROBE: NOT DETECTED
S AUREUS DNA SPEC QL NAA+PROBE: NOT DETECTED

## 2020-05-22 ENCOUNTER — TELEPHONE (OUTPATIENT)
Dept: FAMILY MEDICINE | Age: 65
End: 2020-05-22

## 2020-05-22 ENCOUNTER — TELEPHONE (OUTPATIENT)
Dept: TELEHEALTH | Age: 65
End: 2020-05-22

## 2020-05-26 RX ORDER — ACETAMINOPHEN 500 MG
1000 TABLET ORAL ONCE
Status: CANCELLED | OUTPATIENT
Start: 2020-05-26 | End: 2020-05-26

## 2020-05-26 RX ORDER — SODIUM CHLORIDE, SODIUM LACTATE, POTASSIUM CHLORIDE, CALCIUM CHLORIDE 600; 310; 30; 20 MG/100ML; MG/100ML; MG/100ML; MG/100ML
INJECTION, SOLUTION INTRAVENOUS CONTINUOUS
Status: CANCELLED | OUTPATIENT
Start: 2020-05-26

## 2020-05-26 RX ORDER — 0.9 % SODIUM CHLORIDE 0.9 %
2 VIAL (ML) INJECTION EVERY 12 HOURS SCHEDULED
Status: CANCELLED | OUTPATIENT
Start: 2020-05-26

## 2020-05-26 RX ORDER — GABAPENTIN 300 MG/1
300 CAPSULE ORAL ONCE
Status: CANCELLED | OUTPATIENT
Start: 2020-05-26 | End: 2020-05-26

## 2020-05-27 ENCOUNTER — TELEPHONE (OUTPATIENT)
Dept: FAMILY MEDICINE | Age: 65
End: 2020-05-27

## 2020-05-28 ENCOUNTER — LAB SERVICES (OUTPATIENT)
Dept: LAB | Age: 65
End: 2020-05-28

## 2020-05-28 DIAGNOSIS — R79.89 ELEVATED SERUM CREATININE: ICD-10-CM

## 2020-05-28 PROCEDURE — 80048 BASIC METABOLIC PNL TOTAL CA: CPT | Performed by: CLINICAL MEDICAL LABORATORY

## 2020-05-29 ENCOUNTER — TELEPHONE (OUTPATIENT)
Dept: FAMILY MEDICINE | Age: 65
End: 2020-05-29

## 2020-05-29 DIAGNOSIS — R79.89 ELEVATED SERUM CREATININE: Primary | ICD-10-CM

## 2020-05-29 LAB
ANION GAP SERPL CALC-SCNC: 13 MMOL/L (ref 10–20)
BUN SERPL-MCNC: 24 MG/DL (ref 6–20)
BUN/CREAT SERPL: 16 (ref 7–25)
CALCIUM SERPL-MCNC: 8.3 MG/DL (ref 8.4–10.2)
CHLORIDE SERPL-SCNC: 113 MMOL/L (ref 98–107)
CO2 SERPL-SCNC: 21 MMOL/L (ref 21–32)
CREAT SERPL-MCNC: 1.47 MG/DL (ref 0.51–0.95)
FASTING DURATION TIME PATIENT: 2 HOURS
GFR SERPLBLD BASED ON 1.73 SQ M-ARVRAT: 37 ML/MIN
GLUCOSE SERPL-MCNC: 102 MG/DL (ref 65–99)
POTASSIUM SERPL-SCNC: 4.8 MMOL/L (ref 3.4–5.1)
SODIUM SERPL-SCNC: 142 MMOL/L (ref 135–145)

## 2020-05-29 RX ORDER — CARIPRAZINE 3 MG/1
3 CAPSULE, GELATIN COATED ORAL DAILY
COMMUNITY
Start: 2020-03-07 | End: 2020-08-27 | Stop reason: ALTCHOICE

## 2020-05-29 RX ORDER — FLUTICASONE PROPIONATE AND SALMETEROL XINAFOATE 115; 21 UG/1; UG/1
2 AEROSOL, METERED RESPIRATORY (INHALATION)
COMMUNITY
Start: 2019-05-04 | End: 2022-08-23 | Stop reason: SDUPTHER

## 2020-05-29 RX ORDER — ALBUTEROL SULFATE 90 UG/1
AEROSOL, METERED RESPIRATORY (INHALATION)
COMMUNITY
Start: 2020-04-03 | End: 2020-09-15 | Stop reason: SDUPTHER

## 2020-05-29 SDOH — HEALTH STABILITY: MENTAL HEALTH: HOW OFTEN DO YOU HAVE A DRINK CONTAINING ALCOHOL?: MONTHLY OR LESS

## 2020-05-29 SDOH — HEALTH STABILITY: MENTAL HEALTH: HOW OFTEN DO YOU HAVE 6 OR MORE DRINKS ON ONE OCCASION?: NEVER

## 2020-05-29 SDOH — HEALTH STABILITY: MENTAL HEALTH: HOW MANY STANDARD DRINKS CONTAINING ALCOHOL DO YOU HAVE ON A TYPICAL DAY?: 1 OR 2

## 2020-05-29 ASSESSMENT — COGNITIVE AND FUNCTIONAL STATUS - GENERAL
ARE YOU BLIND OR DO YOU HAVE SERIOUS DIFFICULTY SEEING, EVEN WHEN WEARING GLASSES: NO
ARE YOU DEAF OR DO YOU HAVE SERIOUS DIFFICULTY  HEARING: NO

## 2020-05-29 ASSESSMENT — ACTIVITIES OF DAILY LIVING (ADL)
SENSORY_SUPPORT_DEVICES: DENTURES
RECENT_DECLINE_ADL: NO
ADL_SCORE: 12
ADL_SHORT_OF_BREATH: NO
ADL_BEFORE_ADMISSION: INDEPENDENT
CHRONIC_PAIN_PRESENT: YES, CHRONIC
DESCRIBE HOW PAIN IMPACTS YOUR LIFE: MOBILITY;PHYSICAL ACTIVITY
HISTORY OF FALLING IN THE LAST YEAR (PRIOR TO ADMISSION): YES
NEEDS_ASSIST: NO

## 2020-06-01 ENCOUNTER — LAB SERVICES (OUTPATIENT)
Dept: LAB | Age: 65
End: 2020-06-01

## 2020-06-01 ENCOUNTER — HOSPITAL ENCOUNTER (OUTPATIENT)
Dept: PREADMISSION TESTING | Age: 65
Discharge: HOME | End: 2020-06-01
Attending: ORTHOPAEDIC SURGERY

## 2020-06-01 ENCOUNTER — TELEPHONE (OUTPATIENT)
Dept: ORTHOPEDICS | Age: 65
End: 2020-06-01

## 2020-06-01 DIAGNOSIS — Z01.812 PRE-PROCEDURAL LABORATORY EXAMINATION: ICD-10-CM

## 2020-06-01 DIAGNOSIS — Z01.818 PREOP TESTING: Primary | ICD-10-CM

## 2020-06-01 DIAGNOSIS — M16.11 OSTEOARTHRITIS OF RIGHT HIP, UNSPECIFIED OSTEOARTHRITIS TYPE: ICD-10-CM

## 2020-06-01 LAB
ABO + RH BLD: NORMAL
BLD GP AB SCN SERPL QL GEL: NEGATIVE
SARS-COV-2 RNA RESP QL NAA+PROBE: NOT DETECTED
TYPE AND SCREEN EXPIRATION DATE: NORMAL

## 2020-06-01 PROCEDURE — 86900 BLOOD TYPING SEROLOGIC ABO: CPT | Performed by: PHYSICIAN ASSISTANT

## 2020-06-01 PROCEDURE — U0003 INFECTIOUS AGENT DETECTION BY NUCLEIC ACID (DNA OR RNA); SEVERE ACUTE RESPIRATORY SYNDROME CORONAVIRUS 2 (SARS-COV-2) (CORONAVIRUS DISEASE [COVID-19]), AMPLIFIED PROBE TECHNIQUE, MAKING USE OF HIGH THROUGHPUT TECHNOLOGIES AS DESCRIBED BY CMS-2020-01-R: HCPCS | Performed by: CLINICAL MEDICAL LABORATORY

## 2020-06-02 ENCOUNTER — APPOINTMENT (OUTPATIENT)
Dept: ORTHOPEDICS | Age: 65
End: 2020-06-02

## 2020-06-09 ENCOUNTER — APPOINTMENT (OUTPATIENT)
Dept: ORTHOPEDICS | Age: 65
End: 2020-06-09

## 2020-06-15 ENCOUNTER — APPOINTMENT (OUTPATIENT)
Dept: ORTHOPEDICS | Age: 65
End: 2020-06-15

## 2020-07-07 ENCOUNTER — OFFICE VISIT (OUTPATIENT)
Dept: NEPHROLOGY | Age: 65
End: 2020-07-07

## 2020-07-07 VITALS
WEIGHT: 147 LBS | SYSTOLIC BLOOD PRESSURE: 95 MMHG | HEART RATE: 67 BPM | DIASTOLIC BLOOD PRESSURE: 60 MMHG | OXYGEN SATURATION: 97 % | RESPIRATION RATE: 18 BRPM | BODY MASS INDEX: 24.49 KG/M2 | HEIGHT: 65 IN | TEMPERATURE: 98.9 F

## 2020-07-07 DIAGNOSIS — N17.9 ACUTE RENAL FAILURE, UNSPECIFIED ACUTE RENAL FAILURE TYPE (CMD): Primary | ICD-10-CM

## 2020-07-07 PROCEDURE — 99204 OFFICE O/P NEW MOD 45 MIN: CPT | Performed by: INTERNAL MEDICINE

## 2020-07-10 ENCOUNTER — TELEPHONE (OUTPATIENT)
Dept: FAMILY MEDICINE | Age: 65
End: 2020-07-10

## 2020-07-10 DIAGNOSIS — M16.10 HIP ARTHRITIS: Primary | ICD-10-CM

## 2020-07-13 ENCOUNTER — APPOINTMENT (OUTPATIENT)
Dept: ORTHOPEDICS | Age: 65
End: 2020-07-13

## 2020-07-13 ENCOUNTER — OFFICE VISIT (OUTPATIENT)
Dept: FAMILY MEDICINE | Age: 65
End: 2020-07-13

## 2020-07-13 VITALS
HEIGHT: 65 IN | HEART RATE: 65 BPM | SYSTOLIC BLOOD PRESSURE: 96 MMHG | DIASTOLIC BLOOD PRESSURE: 54 MMHG | BODY MASS INDEX: 25.33 KG/M2 | WEIGHT: 152 LBS

## 2020-07-13 DIAGNOSIS — M79.604 BILATERAL LEG PAIN: Primary | ICD-10-CM

## 2020-07-13 DIAGNOSIS — M79.605 BILATERAL LEG PAIN: Primary | ICD-10-CM

## 2020-07-13 PROCEDURE — 99214 OFFICE O/P EST MOD 30 MIN: CPT | Performed by: FAMILY MEDICINE

## 2020-07-13 RX ORDER — GABAPENTIN 300 MG/1
300 CAPSULE ORAL DAILY
Qty: 30 CAPSULE | Refills: 1 | Status: SHIPPED | OUTPATIENT
Start: 2020-07-13 | End: 2020-09-09

## 2020-07-14 ENCOUNTER — TELEPHONE (OUTPATIENT)
Dept: FAMILY MEDICINE | Age: 65
End: 2020-07-14

## 2020-07-14 DIAGNOSIS — M51.36 DDD (DEGENERATIVE DISC DISEASE), LUMBAR: ICD-10-CM

## 2020-07-14 DIAGNOSIS — M54.16 LUMBAR RADICULOPATHY: ICD-10-CM

## 2020-07-14 DIAGNOSIS — M47.812 CERVICAL SPONDYLOSIS WITHOUT MYELOPATHY: Primary | ICD-10-CM

## 2020-07-16 ENCOUNTER — OFFICE VISIT (OUTPATIENT)
Dept: PULMONOLOGY | Age: 65
End: 2020-07-16
Attending: FAMILY MEDICINE

## 2020-07-16 VITALS
DIASTOLIC BLOOD PRESSURE: 76 MMHG | HEART RATE: 71 BPM | BODY MASS INDEX: 24.76 KG/M2 | HEIGHT: 65 IN | OXYGEN SATURATION: 99 % | WEIGHT: 148.6 LBS | SYSTOLIC BLOOD PRESSURE: 119 MMHG | RESPIRATION RATE: 18 BRPM

## 2020-07-16 DIAGNOSIS — G47.39 OTHER SLEEP APNEA: ICD-10-CM

## 2020-07-16 PROCEDURE — 99211 OFF/OP EST MAY X REQ PHY/QHP: CPT

## 2020-07-28 ENCOUNTER — TELEPHONE (OUTPATIENT)
Dept: SLEEP MEDICINE | Age: 65
End: 2020-07-28

## 2020-07-28 DIAGNOSIS — G47.33 OBSTRUCTIVE SLEEP APNEA (ADULT) (PEDIATRIC): Primary | ICD-10-CM

## 2020-08-06 ENCOUNTER — HOSPITAL ENCOUNTER (OUTPATIENT)
Dept: SLEEP MEDICINE | Age: 65
Discharge: STILL ADMITTED OR ADMINISTRATIVE | End: 2020-08-06
Attending: INTERNAL MEDICINE

## 2020-08-06 DIAGNOSIS — G47.39 OTHER SLEEP APNEA: ICD-10-CM

## 2020-08-06 PROCEDURE — 95810 POLYSOM 6/> YRS 4/> PARAM: CPT

## 2020-08-06 ASSESSMENT — SLEEP AND FATIGUE QUESTIONNAIRES
HOW LIKELY ARE YOU TO NOD OFF OR FALL ASLEEP WHILE SITTING INACTIVE IN A PUBLIC PLACE: HIGH CHANCE OF DOZING
HOW LIKELY ARE YOU TO NOD OFF OR FALL ASLEEP WHILE SITTING QUIETLY AFTER LUNCH WITHOUT ALCOHOL: MODERATE CHANCE OF DOZING
HOW LIKELY ARE YOU TO NOD OFF OR FALL ASLEEP IN A CAR, WHILE STOPPED FOR A FEW MINUTES IN TRAFFIC: MODERATE CHANCE OF DOZING
HOW LIKELY ARE YOU TO NOD OFF OR FALL ASLEEP WHILE SITTING AND TALKING TO SOMEONE: SLIGHT CHANCE OF DOZING
HOW LIKELY ARE YOU TO NOD OFF OR FALL ASLEEP WHILE SITTING AND READING: HIGH CHANCE OF DOZING
HOW LIKELY ARE YOU TO NOD OFF OR FALL ASLEEP WHILE LYING DOWN TO REST IN THE AFTERNOON WHEN CIRCUMSTANCES PERMIT: HIGH CHANCE OF DOZING
HOW LIKELY ARE YOU TO NOD OFF OR FALL ASLEEP WHEN YOU ARE A PASSENGER IN A CAR FOR AN HOUR WITHOUT A BREAK: HIGH CHANCE OF DOZING
ESS TOTAL SCORE: 20
HOW LIKELY ARE YOU TO NOD OFF OR FALL ASLEEP WHILE WATCHING TV: HIGH CHANCE OF DOZING

## 2020-08-06 ASSESSMENT — ACTIVITIES OF DAILY LIVING (ADL)
ADL_BEFORE_ADMISSION: INDEPENDENT
ADL_SCORE: 12

## 2020-08-07 VITALS — WEIGHT: 145 LBS | BODY MASS INDEX: 26.68 KG/M2 | HEIGHT: 62 IN

## 2020-08-12 ENCOUNTER — LAB SERVICES (OUTPATIENT)
Dept: LAB | Age: 65
End: 2020-08-12

## 2020-08-12 ENCOUNTER — OFFICE VISIT (OUTPATIENT)
Dept: FAMILY MEDICINE | Age: 65
End: 2020-08-12

## 2020-08-12 VITALS
HEART RATE: 74 BPM | HEIGHT: 65 IN | WEIGHT: 150 LBS | DIASTOLIC BLOOD PRESSURE: 69 MMHG | BODY MASS INDEX: 24.99 KG/M2 | SYSTOLIC BLOOD PRESSURE: 114 MMHG

## 2020-08-12 DIAGNOSIS — Z00.00 MEDICARE ANNUAL WELLNESS VISIT, SUBSEQUENT: Primary | ICD-10-CM

## 2020-08-12 DIAGNOSIS — N17.9 ACUTE RENAL FAILURE, UNSPECIFIED ACUTE RENAL FAILURE TYPE (CMD): ICD-10-CM

## 2020-08-12 DIAGNOSIS — Z72.820 SLEEP DEPRIVATION: ICD-10-CM

## 2020-08-12 DIAGNOSIS — F31.60 BIPOLAR AFFECTIVE DISORDER, CURRENT EPISODE MIXED, CURRENT EPISODE SEVERITY UNSPECIFIED (CMD): ICD-10-CM

## 2020-08-12 DIAGNOSIS — N18.30 CHRONIC KIDNEY DISEASE (CKD), STAGE III (MODERATE) (CMD): ICD-10-CM

## 2020-08-12 LAB
ANION GAP SERPL CALC-SCNC: 11 MMOL/L (ref 10–20)
BUN SERPL-MCNC: 17 MG/DL (ref 6–20)
BUN/CREAT SERPL: 17 (ref 7–25)
CALCIUM SERPL-MCNC: 8.9 MG/DL (ref 8.4–10.2)
CHLORIDE SERPL-SCNC: 112 MMOL/L (ref 98–107)
CO2 SERPL-SCNC: 24 MMOL/L (ref 21–32)
CREAT SERPL-MCNC: 1.03 MG/DL (ref 0.51–0.95)
FASTING DURATION TIME PATIENT: 13 HOURS
GFR SERPLBLD BASED ON 1.73 SQ M-ARVRAT: 57 ML/MIN/1.73M2
GLUCOSE SERPL-MCNC: 89 MG/DL (ref 65–99)
PHOSPHATE SERPL-MCNC: 3 MG/DL (ref 2.4–4.7)
POTASSIUM SERPL-SCNC: 4.5 MMOL/L (ref 3.4–5.1)
PTH-INTACT SERPL-MCNC: 75 PG/ML (ref 19–88)
SODIUM SERPL-SCNC: 142 MMOL/L (ref 135–145)

## 2020-08-12 PROCEDURE — 80048 BASIC METABOLIC PNL TOTAL CA: CPT | Performed by: CLINICAL MEDICAL LABORATORY

## 2020-08-12 PROCEDURE — 99214 OFFICE O/P EST MOD 30 MIN: CPT | Performed by: FAMILY MEDICINE

## 2020-08-12 PROCEDURE — 36415 COLL VENOUS BLD VENIPUNCTURE: CPT | Performed by: INTERNAL MEDICINE

## 2020-08-12 PROCEDURE — 84100 ASSAY OF PHOSPHORUS: CPT | Performed by: CLINICAL MEDICAL LABORATORY

## 2020-08-12 PROCEDURE — 82043 UR ALBUMIN QUANTITATIVE: CPT | Performed by: CLINICAL MEDICAL LABORATORY

## 2020-08-12 PROCEDURE — 83970 ASSAY OF PARATHORMONE: CPT | Performed by: CLINICAL MEDICAL LABORATORY

## 2020-08-12 PROCEDURE — G0438 PPPS, INITIAL VISIT: HCPCS | Performed by: FAMILY MEDICINE

## 2020-08-12 PROCEDURE — 82570 ASSAY OF URINE CREATININE: CPT | Performed by: CLINICAL MEDICAL LABORATORY

## 2020-08-12 RX ORDER — MODAFINIL 200 MG/1
200 TABLET ORAL 2 TIMES DAILY
Qty: 60 TABLET | Refills: 0 | Status: SHIPPED | OUTPATIENT
Start: 2020-08-12 | End: 2020-09-06 | Stop reason: SDUPTHER

## 2020-08-12 ASSESSMENT — PATIENT HEALTH QUESTIONNAIRE - PHQ9
7. TROUBLE CONCENTRATING ON THINGS, SUCH AS READING THE NEWSPAPER OR WATCHING TELEVISION: NEARLY EVERY DAY
6. FEELING BAD ABOUT YOURSELF - OR THAT YOU ARE A FAILURE OR HAVE LET YOURSELF OR YOUR FAMILY DOWN: MORE THAN HALF THE DAYS
5. POOR APPETITE, WEIGHT LOSS, OR OVEREATING: MORE THAN HALF THE DAYS
2. FEELING DOWN, DEPRESSED OR HOPELESS: NEARLY EVERY DAY
10. IF YOU CHECKED OFF ANY PROBLEMS, HOW DIFFICULT HAVE THESE PROBLEMS MADE IT FOR YOU TO DO YOUR WORK, TAKE CARE OF THINGS AT HOME, OR GET ALONG WITH OTHER PEOPLE: VERY DIFFICULT
CLINICAL INTERPRETATION OF PHQ9 SCORE: FURTHER SCREENING NEEDED
CLINICAL INTERPRETATION OF PHQ2 SCORE: SEVERE DEPRESSION
SUM OF ALL RESPONSES TO PHQ9 QUESTIONS 1 AND 2: 6
SUM OF ALL RESPONSES TO PHQ9 QUESTIONS 1 TO 9: 20
8. MOVING OR SPEAKING SO SLOWLY THAT OTHER PEOPLE COULD HAVE NOTICED. OR THE OPPOSITE, BEING SO FIGETY OR RESTLESS THAT YOU HAVE BEEN MOVING AROUND A LOT MORE THAN USUAL: NEARLY EVERY DAY
4. FEELING TIRED OR HAVING LITTLE ENERGY: NEARLY EVERY DAY
CLINICAL INTERPRETATION OF PHQ2 SCORE: FURTHER SCREENING NEEDED
1. LITTLE INTEREST OR PLEASURE IN DOING THINGS: NEARLY EVERY DAY
SUM OF ALL RESPONSES TO PHQ QUESTIONS 1-9: 20
3. TROUBLE FALLING OR STAYING ASLEEP OR SLEEPING TOO MUCH: SEVERAL DAYS
9. THOUGHTS THAT YOU WOULD BE BETTER OFF DEAD, OR OF HURTING YOURSELF: NOT AT ALL
SUM OF ALL RESPONSES TO PHQ9 QUESTIONS 1 AND 2: 6
CLINICAL INTERPRETATION OF PHQ9 SCORE: SEVERE DEPRESSION

## 2020-08-13 ENCOUNTER — TELEPHONE (OUTPATIENT)
Dept: NEPHROLOGY | Age: 65
End: 2020-08-13

## 2020-08-13 LAB
CREAT UR-MCNC: 29.9 MG/DL
MICROALBUMIN UR-MCNC: <0.5 MG/DL
MICROALBUMIN/CREAT UR: NORMAL MG/G{CREAT}

## 2020-08-18 ENCOUNTER — TELEPHONE (OUTPATIENT)
Dept: TELEHEALTH | Age: 65
End: 2020-08-18

## 2020-08-19 DIAGNOSIS — Z01.812 PRE-PROCEDURAL LABORATORY EXAMINATION: Primary | ICD-10-CM

## 2020-08-25 ENCOUNTER — OFFICE VISIT (OUTPATIENT)
Dept: FAMILY MEDICINE | Age: 65
End: 2020-08-25

## 2020-08-25 ENCOUNTER — LAB SERVICES (OUTPATIENT)
Dept: LAB | Age: 65
End: 2020-08-25

## 2020-08-25 VITALS
DIASTOLIC BLOOD PRESSURE: 99 MMHG | WEIGHT: 149 LBS | SYSTOLIC BLOOD PRESSURE: 118 MMHG | BODY MASS INDEX: 24.83 KG/M2 | HEART RATE: 68 BPM | HEIGHT: 65 IN

## 2020-08-25 DIAGNOSIS — I10 ESSENTIAL HYPERTENSION: ICD-10-CM

## 2020-08-25 DIAGNOSIS — N18.30 CKD (CHRONIC KIDNEY DISEASE) STAGE 3, GFR 30-59 ML/MIN (CMD): ICD-10-CM

## 2020-08-25 DIAGNOSIS — M16.10 HIP ARTHRITIS: ICD-10-CM

## 2020-08-25 DIAGNOSIS — Z01.818 PRE-OP EVALUATION: ICD-10-CM

## 2020-08-25 DIAGNOSIS — Z01.818 PRE-OP EVALUATION: Primary | ICD-10-CM

## 2020-08-25 LAB
ANION GAP SERPL CALC-SCNC: 10 MMOL/L (ref 10–20)
BUN SERPL-MCNC: 11 MG/DL (ref 6–20)
BUN/CREAT SERPL: 11 (ref 7–25)
CALCIUM SERPL-MCNC: 8.8 MG/DL (ref 8.4–10.2)
CHLORIDE SERPL-SCNC: 111 MMOL/L (ref 98–107)
CO2 SERPL-SCNC: 25 MMOL/L (ref 21–32)
CREAT SERPL-MCNC: 1 MG/DL (ref 0.51–0.95)
FASTING DURATION TIME PATIENT: 6 HOURS
GFR SERPLBLD BASED ON 1.73 SQ M-ARVRAT: 59 ML/MIN/1.73M2
GLUCOSE SERPL-MCNC: 86 MG/DL (ref 65–99)
POTASSIUM SERPL-SCNC: 4.8 MMOL/L (ref 3.4–5.1)
SODIUM SERPL-SCNC: 141 MMOL/L (ref 135–145)

## 2020-08-25 PROCEDURE — 99214 OFFICE O/P EST MOD 30 MIN: CPT | Performed by: FAMILY MEDICINE

## 2020-08-25 PROCEDURE — 87640 STAPH A DNA AMP PROBE: CPT | Performed by: CLINICAL MEDICAL LABORATORY

## 2020-08-25 PROCEDURE — 80048 BASIC METABOLIC PNL TOTAL CA: CPT | Performed by: CLINICAL MEDICAL LABORATORY

## 2020-08-25 PROCEDURE — 85027 COMPLETE CBC AUTOMATED: CPT | Performed by: CLINICAL MEDICAL LABORATORY

## 2020-08-25 PROCEDURE — 36415 COLL VENOUS BLD VENIPUNCTURE: CPT | Performed by: INTERNAL MEDICINE

## 2020-08-25 PROCEDURE — 87641 MR-STAPH DNA AMP PROBE: CPT | Performed by: CLINICAL MEDICAL LABORATORY

## 2020-08-26 ENCOUNTER — TELEPHONE (OUTPATIENT)
Dept: INTERNAL MEDICINE | Age: 65
End: 2020-08-26

## 2020-08-26 LAB
DEPRECATED RDW RBC: 49.3 FL (ref 39–50)
ERYTHROCYTE [DISTWIDTH] IN BLOOD: 12.6 % (ref 11–15)
HCT VFR BLD CALC: 42.9 % (ref 36–46.5)
HGB BLD-MCNC: 13 G/DL (ref 12–15.5)
MCH RBC QN AUTO: 32.3 PG (ref 26–34)
MCHC RBC AUTO-ENTMCNC: 30.3 G/DL (ref 32–36.5)
MCV RBC AUTO: 106.5 FL (ref 78–100)
MRSA DNA SPEC QL NAA+PROBE: NOT DETECTED
NRBC BLD MANUAL-RTO: 0 /100 WBC
PLATELET # BLD AUTO: 233 K/MCL (ref 140–450)
RBC # BLD: 4.03 MIL/MCL (ref 4–5.2)
S AUREUS DNA SPEC QL NAA+PROBE: NOT DETECTED
WBC # BLD: 4.5 K/MCL (ref 4.2–11)

## 2020-08-27 RX ORDER — CETIRIZINE HYDROCHLORIDE 10 MG/1
10 TABLET ORAL DAILY
COMMUNITY
End: 2022-11-16 | Stop reason: SDUPTHER

## 2020-08-27 SDOH — HEALTH STABILITY: MENTAL HEALTH: HOW OFTEN DO YOU HAVE A DRINK CONTAINING ALCOHOL?: MONTHLY OR LESS

## 2020-08-27 SDOH — HEALTH STABILITY: MENTAL HEALTH: HOW OFTEN DO YOU HAVE 6 OR MORE DRINKS ON ONE OCCASION?: NEVER

## 2020-08-27 SDOH — HEALTH STABILITY: MENTAL HEALTH: HOW MANY STANDARD DRINKS CONTAINING ALCOHOL DO YOU HAVE ON A TYPICAL DAY?: 1 OR 2

## 2020-08-27 ASSESSMENT — ACTIVITIES OF DAILY LIVING (ADL)
ADL_SHORT_OF_BREATH: NO
RECENT_DECLINE_ADL: NO
SENSORY_SUPPORT_DEVICES: EYEGLASSES;DENTURES
ADL_BEFORE_ADMISSION: INDEPENDENT
NEEDS_ASSIST: NO
HISTORY OF FALLING IN THE LAST YEAR (PRIOR TO ADMISSION): YES
CHRONIC_PAIN_PRESENT: NO
ADL_SCORE: 12

## 2020-08-28 ENCOUNTER — TELEPHONE (OUTPATIENT)
Dept: ORTHOPEDICS | Age: 65
End: 2020-08-28

## 2020-08-28 DIAGNOSIS — M16.12 PRIMARY OSTEOARTHRITIS OF LEFT HIP: Primary | ICD-10-CM

## 2020-08-31 ENCOUNTER — LAB SERVICES (OUTPATIENT)
Dept: LAB | Age: 65
End: 2020-08-31

## 2020-08-31 DIAGNOSIS — Z01.812 PRE-PROCEDURAL LABORATORY EXAMINATION: ICD-10-CM

## 2020-08-31 LAB
SARS-COV-2 RNA RESP QL NAA+PROBE: NOT DETECTED
SERVICE CMNT-IMP: NORMAL

## 2020-08-31 PROCEDURE — U0003 INFECTIOUS AGENT DETECTION BY NUCLEIC ACID (DNA OR RNA); SEVERE ACUTE RESPIRATORY SYNDROME CORONAVIRUS 2 (SARS-COV-2) (CORONAVIRUS DISEASE [COVID-19]), AMPLIFIED PROBE TECHNIQUE, MAKING USE OF HIGH THROUGHPUT TECHNOLOGIES AS DESCRIBED BY CMS-2020-01-R: HCPCS | Performed by: CLINICAL MEDICAL LABORATORY

## 2020-09-01 ENCOUNTER — ANESTHESIA EVENT (OUTPATIENT)
Dept: SURGERY | Age: 65
DRG: 470 | End: 2020-09-01

## 2020-09-02 ENCOUNTER — ANESTHESIA (OUTPATIENT)
Dept: SURGERY | Age: 65
DRG: 470 | End: 2020-09-02

## 2020-09-02 ENCOUNTER — HOSPITAL ENCOUNTER (INPATIENT)
Age: 65
LOS: 1 days | Discharge: NURSING, REHAB OR LONG-TERM CARE | DRG: 470 | End: 2020-09-04
Attending: ORTHOPAEDIC SURGERY | Admitting: ORTHOPAEDIC SURGERY

## 2020-09-02 ENCOUNTER — APPOINTMENT (OUTPATIENT)
Dept: GENERAL RADIOLOGY | Age: 65
DRG: 470 | End: 2020-09-02
Attending: ORTHOPAEDIC SURGERY

## 2020-09-02 DIAGNOSIS — M16.12 PRIMARY OSTEOARTHRITIS OF LEFT HIP: ICD-10-CM

## 2020-09-02 DIAGNOSIS — Z51.89 ENCOUNTER FOR THERAPEUTIC PROCEDURE: Primary | ICD-10-CM

## 2020-09-02 DIAGNOSIS — Z96.642 STATUS POST LEFT HIP REPLACEMENT: Primary | ICD-10-CM

## 2020-09-02 LAB
ABO + RH BLD: NORMAL
BLD GP AB SCN SERPL QL GEL: NEGATIVE
RAINBOW EXTRA TUBES HOLD SPECIMEN: NORMAL
TYPE AND SCREEN EXPIRATION DATE: NORMAL

## 2020-09-02 PROCEDURE — 10002803 HB RX 637: Performed by: PHYSICIAN ASSISTANT

## 2020-09-02 PROCEDURE — 10004451 HB PACU RECOVERY 1ST 30 MINUTES: Performed by: ORTHOPAEDIC SURGERY

## 2020-09-02 PROCEDURE — 10002016 HB COUNTER INCENTIVE SPIROMETRY

## 2020-09-02 PROCEDURE — 10005281 FL PACS RECORD NR

## 2020-09-02 PROCEDURE — 10002807 HB RX 258: Performed by: ANESTHESIOLOGY

## 2020-09-02 PROCEDURE — 10004316 HB COUNTER-PREP

## 2020-09-02 PROCEDURE — 97162 PT EVAL MOD COMPLEX 30 MIN: CPT

## 2020-09-02 PROCEDURE — 10004173 HB COUNTER-THERAPY VISIT PT

## 2020-09-02 PROCEDURE — 10002800 HB RX 250 W HCPCS: Performed by: ANESTHESIOLOGY

## 2020-09-02 PROCEDURE — 10002801 HB RX 250 W/O HCPCS: Performed by: ORTHOPAEDIC SURGERY

## 2020-09-02 PROCEDURE — 10004281 HB COUNTER-STAFF TIME PER 15 MIN

## 2020-09-02 PROCEDURE — 94150 VITAL CAPACITY TEST: CPT

## 2020-09-02 PROCEDURE — 10002803 HB RX 637: Performed by: ORTHOPAEDIC SURGERY

## 2020-09-02 PROCEDURE — 10006403 HB ORTHO MAJOR COMPLEX: Performed by: ORTHOPAEDIC SURGERY

## 2020-09-02 PROCEDURE — 10003056 HB DISPOSABLE INSTRUMENT/SUPPLY 1: Performed by: ORTHOPAEDIC SURGERY

## 2020-09-02 PROCEDURE — 10004452 HB PACU ADDL 30 MINUTES: Performed by: ORTHOPAEDIC SURGERY

## 2020-09-02 PROCEDURE — 86900 BLOOD TYPING SEROLOGIC ABO: CPT | Performed by: PHYSICIAN ASSISTANT

## 2020-09-02 PROCEDURE — 10002801 HB RX 250 W/O HCPCS: Performed by: ANESTHESIOLOGY

## 2020-09-02 PROCEDURE — 10002800 HB RX 250 W HCPCS: Performed by: ORTHOPAEDIC SURGERY

## 2020-09-02 PROCEDURE — 27130 TOTAL HIP ARTHROPLASTY: CPT | Performed by: ANESTHESIOLOGY

## 2020-09-02 PROCEDURE — C1776 JOINT DEVICE (IMPLANTABLE): HCPCS | Performed by: ORTHOPAEDIC SURGERY

## 2020-09-02 PROCEDURE — 10002117 HB ADDITIONAL SURGERY TIME/30 MIN: Performed by: ORTHOPAEDIC SURGERY

## 2020-09-02 PROCEDURE — 10002019 HB COUNTER RESP ASSESSMENT

## 2020-09-02 PROCEDURE — C1713 ANCHOR/SCREW BN/BN,TIS/BN: HCPCS | Performed by: ORTHOPAEDIC SURGERY

## 2020-09-02 PROCEDURE — 10004651 HB RX, NO CHARGE ITEM: Performed by: ORTHOPAEDIC SURGERY

## 2020-09-02 PROCEDURE — 10002807 HB RX 258: Performed by: ORTHOPAEDIC SURGERY

## 2020-09-02 PROCEDURE — 10002359 HB ANESTH GENERAL ADD'L 1/2 HR: Performed by: ORTHOPAEDIC SURGERY

## 2020-09-02 PROCEDURE — 10000002 HB ROOM CHARGE MED SURG

## 2020-09-02 PROCEDURE — 10004651 HB RX, NO CHARGE ITEM: Performed by: PHYSICIAN ASSISTANT

## 2020-09-02 PROCEDURE — 10002358 HB ANESTH GENERAL 1ST 1/2 HR: Performed by: ORTHOPAEDIC SURGERY

## 2020-09-02 PROCEDURE — 36415 COLL VENOUS BLD VENIPUNCTURE: CPT | Performed by: PHYSICIAN ASSISTANT

## 2020-09-02 PROCEDURE — 94640 AIRWAY INHALATION TREATMENT: CPT

## 2020-09-02 PROCEDURE — 0SRB0JA REPLACEMENT OF LEFT HIP JOINT WITH SYNTHETIC SUBSTITUTE, UNCEMENTED, OPEN APPROACH: ICD-10-PCS | Performed by: ORTHOPAEDIC SURGERY

## 2020-09-02 PROCEDURE — 97116 GAIT TRAINING THERAPY: CPT

## 2020-09-02 PROCEDURE — 27130 TOTAL HIP ARTHROPLASTY: CPT | Performed by: ORTHOPAEDIC SURGERY

## 2020-09-02 PROCEDURE — 10004125 HB COUNTER-FIRST DAY ADMIT

## 2020-09-02 DEVICE — PINNACLE HIP SOLUTIONS ALTRX POLYETHYLENE ACETABULAR LINER NEUTRAL 36MM ID 54MM OD
Type: IMPLANTABLE DEVICE | Site: HIP | Status: NON-FUNCTIONAL
Brand: PINNACLE ALTRX
Removed: 2021-02-24

## 2020-09-02 DEVICE — M-SPEC METAL FEMORAL HEAD 12/14 TAPER DIAMETER 36MM -2
Type: IMPLANTABLE DEVICE | Site: HIP | Status: NON-FUNCTIONAL
Removed: 2021-02-24

## 2020-09-02 DEVICE — ACTIS DUOFIX HIP PROSTHESIS (FEMORAL STEM 12/14 TAPER CEMENTLESS SIZE 7 STD COLLAR)  CE
Type: IMPLANTABLE DEVICE | Site: HIP | Status: FUNCTIONAL
Brand: ACTIS

## 2020-09-02 DEVICE — PINNACLE CANCELLOUS BONE SCREW 6.5MM X 20MM
Type: IMPLANTABLE DEVICE | Site: HIP | Status: FUNCTIONAL
Brand: PINNACLE

## 2020-09-02 DEVICE — PINNACLE GRIPTION ACETABULAR SHELL SECTOR 54MM OD
Type: IMPLANTABLE DEVICE | Site: HIP | Status: FUNCTIONAL
Brand: PINNACLE GRIPTION

## 2020-09-02 RX ORDER — ONDANSETRON 2 MG/ML
4 INJECTION INTRAMUSCULAR; INTRAVENOUS 2 TIMES DAILY PRN
Status: DISCONTINUED | OUTPATIENT
Start: 2020-09-02 | End: 2020-09-02 | Stop reason: HOSPADM

## 2020-09-02 RX ORDER — BUDESONIDE AND FORMOTEROL FUMARATE DIHYDRATE 160; 4.5 UG/1; UG/1
2 AEROSOL RESPIRATORY (INHALATION)
Status: DISCONTINUED | OUTPATIENT
Start: 2020-09-02 | End: 2020-09-04 | Stop reason: HOSPADM

## 2020-09-02 RX ORDER — SODIUM CHLORIDE, SODIUM LACTATE, POTASSIUM CHLORIDE, CALCIUM CHLORIDE 600; 310; 30; 20 MG/100ML; MG/100ML; MG/100ML; MG/100ML
INJECTION, SOLUTION INTRAVENOUS CONTINUOUS
Status: ACTIVE | OUTPATIENT
Start: 2020-09-02 | End: 2020-09-02

## 2020-09-02 RX ORDER — VANCOMYCIN HYDROCHLORIDE 1 G/20ML
INJECTION, POWDER, LYOPHILIZED, FOR SOLUTION INTRAVENOUS PRN
Status: DISCONTINUED | OUTPATIENT
Start: 2020-09-02 | End: 2020-09-02 | Stop reason: HOSPADM

## 2020-09-02 RX ORDER — DEXAMETHASONE SODIUM PHOSPHATE 10 MG/ML
10 INJECTION, SOLUTION INTRAMUSCULAR; INTRAVENOUS ONCE
Status: COMPLETED | OUTPATIENT
Start: 2020-09-03 | End: 2020-09-03

## 2020-09-02 RX ORDER — SODIUM CHLORIDE, SODIUM LACTATE, POTASSIUM CHLORIDE, CALCIUM CHLORIDE 600; 310; 30; 20 MG/100ML; MG/100ML; MG/100ML; MG/100ML
INJECTION, SOLUTION INTRAVENOUS CONTINUOUS
Status: DISCONTINUED | OUTPATIENT
Start: 2020-09-02 | End: 2020-09-02

## 2020-09-02 RX ORDER — METOCLOPRAMIDE HYDROCHLORIDE 5 MG/ML
5 INJECTION INTRAMUSCULAR; INTRAVENOUS EVERY 6 HOURS PRN
Status: DISCONTINUED | OUTPATIENT
Start: 2020-09-02 | End: 2020-09-02 | Stop reason: HOSPADM

## 2020-09-02 RX ORDER — BISACODYL 10 MG
10 SUPPOSITORY, RECTAL RECTAL DAILY PRN
Status: DISCONTINUED | OUTPATIENT
Start: 2020-09-02 | End: 2020-09-04 | Stop reason: HOSPADM

## 2020-09-02 RX ORDER — OXYCODONE HYDROCHLORIDE 5 MG/1
5-10 TABLET ORAL EVERY 4 HOURS PRN
Status: DISCONTINUED | OUTPATIENT
Start: 2020-09-02 | End: 2020-09-04 | Stop reason: HOSPADM

## 2020-09-02 RX ORDER — MODAFINIL 100 MG/1
200 TABLET ORAL 2 TIMES DAILY
Status: DISCONTINUED | OUTPATIENT
Start: 2020-09-03 | End: 2020-09-04 | Stop reason: HOSPADM

## 2020-09-02 RX ORDER — DIPHENHYDRAMINE HYDROCHLORIDE 50 MG/ML
12.5 INJECTION INTRAMUSCULAR; INTRAVENOUS EVERY 4 HOURS PRN
Status: DISCONTINUED | OUTPATIENT
Start: 2020-09-02 | End: 2020-09-02 | Stop reason: HOSPADM

## 2020-09-02 RX ORDER — SERTRALINE HYDROCHLORIDE 100 MG/1
100 TABLET, FILM COATED ORAL DAILY
Status: DISCONTINUED | OUTPATIENT
Start: 2020-09-03 | End: 2020-09-04 | Stop reason: HOSPADM

## 2020-09-02 RX ORDER — DEXTROSE MONOHYDRATE 25 G/50ML
25 INJECTION, SOLUTION INTRAVENOUS PRN
Status: DISCONTINUED | OUTPATIENT
Start: 2020-09-02 | End: 2020-09-02 | Stop reason: HOSPADM

## 2020-09-02 RX ORDER — TOPIRAMATE 100 MG/1
400 TABLET, FILM COATED ORAL NIGHTLY
Status: DISCONTINUED | OUTPATIENT
Start: 2020-09-02 | End: 2020-09-04 | Stop reason: HOSPADM

## 2020-09-02 RX ORDER — ONDANSETRON 2 MG/ML
4 INJECTION INTRAMUSCULAR; INTRAVENOUS EVERY 12 HOURS PRN
Status: DISCONTINUED | OUTPATIENT
Start: 2020-09-02 | End: 2020-09-04 | Stop reason: HOSPADM

## 2020-09-02 RX ORDER — DIPHENHYDRAMINE HYDROCHLORIDE 50 MG/ML
25 INJECTION INTRAMUSCULAR; INTRAVENOUS
Status: DISCONTINUED | OUTPATIENT
Start: 2020-09-02 | End: 2020-09-02 | Stop reason: HOSPADM

## 2020-09-02 RX ORDER — ENOXAPARIN SODIUM 100 MG/ML
40 INJECTION SUBCUTANEOUS DAILY
Status: DISCONTINUED | OUTPATIENT
Start: 2020-09-03 | End: 2020-09-04 | Stop reason: HOSPADM

## 2020-09-02 RX ORDER — 0.9 % SODIUM CHLORIDE 0.9 %
2 VIAL (ML) INJECTION EVERY 12 HOURS SCHEDULED
Status: DISCONTINUED | OUTPATIENT
Start: 2020-09-02 | End: 2020-09-02 | Stop reason: HOSPADM

## 2020-09-02 RX ORDER — BUPIVACAINE HYDROCHLORIDE AND EPINEPHRINE 2.5; 5 MG/ML; UG/ML
INJECTION, SOLUTION EPIDURAL; INFILTRATION; INTRACAUDAL; PERINEURAL PRN
Status: DISCONTINUED | OUTPATIENT
Start: 2020-09-02 | End: 2020-09-02 | Stop reason: HOSPADM

## 2020-09-02 RX ORDER — HYDROCODONE BITARTRATE AND ACETAMINOPHEN 5; 325 MG/1; MG/1
1 TABLET ORAL EVERY 4 HOURS PRN
Status: DISCONTINUED | OUTPATIENT
Start: 2020-09-02 | End: 2020-09-04 | Stop reason: HOSPADM

## 2020-09-02 RX ORDER — DULOXETIN HYDROCHLORIDE 60 MG/1
60 CAPSULE, DELAYED RELEASE ORAL 2 TIMES DAILY
Status: DISCONTINUED | OUTPATIENT
Start: 2020-09-02 | End: 2020-09-02 | Stop reason: CLARIF

## 2020-09-02 RX ORDER — VITAMIN B COMPLEX
25 TABLET ORAL DAILY
Status: DISCONTINUED | OUTPATIENT
Start: 2020-09-03 | End: 2020-09-04 | Stop reason: HOSPADM

## 2020-09-02 RX ORDER — PROPOFOL 10 MG/ML
INJECTION, EMULSION INTRAVENOUS PRN
Status: DISCONTINUED | OUTPATIENT
Start: 2020-09-02 | End: 2020-09-02

## 2020-09-02 RX ORDER — ACETAMINOPHEN 325 MG/1
650 TABLET ORAL EVERY 6 HOURS PRN
Status: DISCONTINUED | OUTPATIENT
Start: 2020-09-02 | End: 2020-09-04 | Stop reason: HOSPADM

## 2020-09-02 RX ORDER — DEXAMETHASONE SODIUM PHOSPHATE 4 MG/ML
INJECTION, SOLUTION INTRA-ARTICULAR; INTRALESIONAL; INTRAMUSCULAR; INTRAVENOUS; SOFT TISSUE PRN
Status: DISCONTINUED | OUTPATIENT
Start: 2020-09-02 | End: 2020-09-02

## 2020-09-02 RX ORDER — CALCIUM CARBONATE 500 MG/1
500-1000 TABLET, CHEWABLE ORAL EVERY 4 HOURS PRN
Status: DISCONTINUED | OUTPATIENT
Start: 2020-09-02 | End: 2020-09-04 | Stop reason: HOSPADM

## 2020-09-02 RX ORDER — ONDANSETRON 4 MG/1
4 TABLET, ORALLY DISINTEGRATING ORAL EVERY 12 HOURS PRN
Status: DISCONTINUED | OUTPATIENT
Start: 2020-09-02 | End: 2020-09-04 | Stop reason: HOSPADM

## 2020-09-02 RX ORDER — ONDANSETRON 2 MG/ML
INJECTION INTRAMUSCULAR; INTRAVENOUS PRN
Status: DISCONTINUED | OUTPATIENT
Start: 2020-09-02 | End: 2020-09-02

## 2020-09-02 RX ORDER — MULTIVITAMIN,THER AND MINERALS
1 TABLET ORAL DAILY
Status: DISCONTINUED | OUTPATIENT
Start: 2020-09-02 | End: 2020-09-04 | Stop reason: HOSPADM

## 2020-09-02 RX ORDER — LEVOTHYROXINE SODIUM 0.05 MG/1
50 TABLET ORAL DAILY
Status: DISCONTINUED | OUTPATIENT
Start: 2020-09-03 | End: 2020-09-04 | Stop reason: HOSPADM

## 2020-09-02 RX ORDER — TRAZODONE HYDROCHLORIDE 100 MG/1
200 TABLET ORAL NIGHTLY
Status: DISCONTINUED | OUTPATIENT
Start: 2020-09-02 | End: 2020-09-04 | Stop reason: HOSPADM

## 2020-09-02 RX ORDER — PROCHLORPERAZINE EDISYLATE 5 MG/ML
5 INJECTION INTRAMUSCULAR; INTRAVENOUS EVERY 4 HOURS PRN
Status: DISCONTINUED | OUTPATIENT
Start: 2020-09-02 | End: 2020-09-02 | Stop reason: HOSPADM

## 2020-09-02 RX ORDER — ALBUTEROL SULFATE 2.5 MG/3ML
5 SOLUTION RESPIRATORY (INHALATION) ONCE
Status: DISCONTINUED | OUTPATIENT
Start: 2020-09-02 | End: 2020-09-02 | Stop reason: HOSPADM

## 2020-09-02 RX ORDER — METOCLOPRAMIDE HYDROCHLORIDE 5 MG/ML
5 INJECTION INTRAMUSCULAR; INTRAVENOUS EVERY 6 HOURS PRN
Status: DISCONTINUED | OUTPATIENT
Start: 2020-09-02 | End: 2020-09-04 | Stop reason: HOSPADM

## 2020-09-02 RX ORDER — FERROUS SULFATE 325(65) MG
325 TABLET ORAL 3 TIMES DAILY
Status: DISCONTINUED | OUTPATIENT
Start: 2020-09-02 | End: 2020-09-04 | Stop reason: HOSPADM

## 2020-09-02 RX ORDER — CYCLOBENZAPRINE HCL 10 MG
5 TABLET ORAL 3 TIMES DAILY PRN
Status: DISCONTINUED | OUTPATIENT
Start: 2020-09-02 | End: 2020-09-04 | Stop reason: HOSPADM

## 2020-09-02 RX ORDER — ASPIRIN 81 MG/1
81 TABLET, CHEWABLE ORAL DAILY
Status: DISCONTINUED | OUTPATIENT
Start: 2020-09-02 | End: 2020-09-04 | Stop reason: HOSPADM

## 2020-09-02 RX ORDER — HYDRALAZINE HYDROCHLORIDE 20 MG/ML
5 INJECTION INTRAMUSCULAR; INTRAVENOUS EVERY 10 MIN PRN
Status: DISCONTINUED | OUTPATIENT
Start: 2020-09-02 | End: 2020-09-02 | Stop reason: HOSPADM

## 2020-09-02 RX ORDER — POLYETHYLENE GLYCOL 3350 17 G/17G
17 POWDER, FOR SOLUTION ORAL DAILY PRN
Status: DISCONTINUED | OUTPATIENT
Start: 2020-09-02 | End: 2020-09-04 | Stop reason: HOSPADM

## 2020-09-02 RX ORDER — LANOLIN ALCOHOL/MO/W.PET/CERES
100 CREAM (GRAM) TOPICAL DAILY
Status: DISCONTINUED | OUTPATIENT
Start: 2020-09-03 | End: 2020-09-04 | Stop reason: HOSPADM

## 2020-09-02 RX ORDER — ACETAMINOPHEN 650 MG/1
650 SUPPOSITORY RECTAL EVERY 4 HOURS PRN
Status: DISCONTINUED | OUTPATIENT
Start: 2020-09-02 | End: 2020-09-04 | Stop reason: HOSPADM

## 2020-09-02 RX ORDER — LIDOCAINE HYDROCHLORIDE 20 MG/ML
INJECTION, SOLUTION INFILTRATION; PERINEURAL PRN
Status: DISCONTINUED | OUTPATIENT
Start: 2020-09-02 | End: 2020-09-02

## 2020-09-02 RX ORDER — GABAPENTIN 300 MG/1
300 CAPSULE ORAL DAILY
Status: DISCONTINUED | OUTPATIENT
Start: 2020-09-03 | End: 2020-09-04 | Stop reason: HOSPADM

## 2020-09-02 RX ORDER — LISINOPRIL 20 MG/1
20 TABLET ORAL DAILY
Status: DISCONTINUED | OUTPATIENT
Start: 2020-09-03 | End: 2020-09-04 | Stop reason: HOSPADM

## 2020-09-02 RX ORDER — METOCLOPRAMIDE 5 MG/1
5 TABLET ORAL EVERY 6 HOURS PRN
Status: DISCONTINUED | OUTPATIENT
Start: 2020-09-02 | End: 2020-09-04 | Stop reason: HOSPADM

## 2020-09-02 RX ORDER — VECURONIUM BROMIDE 1 MG/ML
INJECTION, POWDER, LYOPHILIZED, FOR SOLUTION INTRAVENOUS PRN
Status: DISCONTINUED | OUTPATIENT
Start: 2020-09-02 | End: 2020-09-02

## 2020-09-02 RX ORDER — GABAPENTIN 300 MG/1
300 CAPSULE ORAL ONCE
Status: COMPLETED | OUTPATIENT
Start: 2020-09-02 | End: 2020-09-02

## 2020-09-02 RX ORDER — PANTOPRAZOLE SODIUM 40 MG/1
40 TABLET, DELAYED RELEASE ORAL
Status: DISCONTINUED | OUTPATIENT
Start: 2020-09-03 | End: 2020-09-04 | Stop reason: HOSPADM

## 2020-09-02 RX ORDER — HUMAN INSULIN 100 [IU]/ML
INJECTION, SOLUTION SUBCUTANEOUS
Status: DISCONTINUED | OUTPATIENT
Start: 2020-09-02 | End: 2020-09-02 | Stop reason: HOSPADM

## 2020-09-02 RX ORDER — MIDAZOLAM HYDROCHLORIDE 1 MG/ML
INJECTION, SOLUTION INTRAMUSCULAR; INTRAVENOUS PRN
Status: DISCONTINUED | OUTPATIENT
Start: 2020-09-02 | End: 2020-09-02

## 2020-09-02 RX ORDER — ACETAMINOPHEN 500 MG
1000 TABLET ORAL ONCE
Status: COMPLETED | OUTPATIENT
Start: 2020-09-02 | End: 2020-09-02

## 2020-09-02 RX ORDER — ROCURONIUM BROMIDE 10 MG/ML
INJECTION, SOLUTION INTRAVENOUS PRN
Status: DISCONTINUED | OUTPATIENT
Start: 2020-09-02 | End: 2020-09-02

## 2020-09-02 RX ORDER — CELECOXIB 200 MG/1
200 CAPSULE ORAL ONCE
Status: COMPLETED | OUTPATIENT
Start: 2020-09-02 | End: 2020-09-02

## 2020-09-02 RX ORDER — SODIUM CHLORIDE 9 MG/ML
INJECTION, SOLUTION INTRAVENOUS CONTINUOUS
Status: DISCONTINUED | OUTPATIENT
Start: 2020-09-02 | End: 2020-09-02

## 2020-09-02 RX ORDER — LIDOCAINE HYDROCHLORIDE 10 MG/ML
5-10 INJECTION, SOLUTION INFILTRATION; PERINEURAL PRN
Status: DISCONTINUED | OUTPATIENT
Start: 2020-09-02 | End: 2020-09-02 | Stop reason: HOSPADM

## 2020-09-02 RX ORDER — NALOXONE HCL 0.4 MG/ML
0.2 VIAL (ML) INJECTION EVERY 5 MIN PRN
Status: DISCONTINUED | OUTPATIENT
Start: 2020-09-02 | End: 2020-09-02 | Stop reason: HOSPADM

## 2020-09-02 RX ORDER — SODIUM CHLORIDE, SODIUM LACTATE, POTASSIUM CHLORIDE, CALCIUM CHLORIDE 600; 310; 30; 20 MG/100ML; MG/100ML; MG/100ML; MG/100ML
INJECTION, SOLUTION INTRAVENOUS CONTINUOUS PRN
Status: DISCONTINUED | OUTPATIENT
Start: 2020-09-02 | End: 2020-09-02

## 2020-09-02 RX ORDER — AMOXICILLIN 250 MG
2 CAPSULE ORAL 2 TIMES DAILY PRN
Status: DISCONTINUED | OUTPATIENT
Start: 2020-09-02 | End: 2020-09-04 | Stop reason: HOSPADM

## 2020-09-02 RX ORDER — METOCLOPRAMIDE HYDROCHLORIDE 5 MG/ML
INJECTION INTRAMUSCULAR; INTRAVENOUS PRN
Status: DISCONTINUED | OUTPATIENT
Start: 2020-09-02 | End: 2020-09-02

## 2020-09-02 RX ORDER — ALBUTEROL SULFATE 90 UG/1
2 AEROSOL, METERED RESPIRATORY (INHALATION)
Status: DISCONTINUED | OUTPATIENT
Start: 2020-09-02 | End: 2020-09-04 | Stop reason: HOSPADM

## 2020-09-02 RX ORDER — CETIRIZINE HYDROCHLORIDE 10 MG/1
10 TABLET ORAL DAILY
Status: DISCONTINUED | OUTPATIENT
Start: 2020-09-03 | End: 2020-09-04 | Stop reason: HOSPADM

## 2020-09-02 RX ORDER — TOPIRAMATE 100 MG/1
200 TABLET, FILM COATED ORAL DAILY
Status: DISCONTINUED | OUTPATIENT
Start: 2020-09-03 | End: 2020-09-04 | Stop reason: HOSPADM

## 2020-09-02 RX ADMIN — BUDESONIDE AND FORMOTEROL FUMARATE DIHYDRATE 2 PUFF: 160; 4.5 AEROSOL RESPIRATORY (INHALATION) at 20:31

## 2020-09-02 RX ADMIN — MIDAZOLAM HYDROCHLORIDE 2 MG: 1 INJECTION, SOLUTION INTRAMUSCULAR; INTRAVENOUS at 11:22

## 2020-09-02 RX ADMIN — VECURONIUM BROMIDE 5 MG: 1 INJECTION, POWDER, LYOPHILIZED, FOR SOLUTION INTRAVENOUS at 13:03

## 2020-09-02 RX ADMIN — FERROUS SULFATE TAB 325 MG (65 MG ELEMENTAL FE) 325 MG: 325 (65 FE) TAB at 20:16

## 2020-09-02 RX ADMIN — TRANEXAMIC ACID 1000 MG: 100 INJECTION, SOLUTION INTRAVENOUS at 13:25

## 2020-09-02 RX ADMIN — Medication 1 TABLET: at 19:00

## 2020-09-02 RX ADMIN — TOPIRAMATE 400 MG: 100 TABLET, FILM COATED ORAL at 20:16

## 2020-09-02 RX ADMIN — KETOROLAC TROMETHAMINE 15 MG: 15 INJECTION, SOLUTION INTRAMUSCULAR; INTRAVENOUS at 18:13

## 2020-09-02 RX ADMIN — PROPOFOL 100 MG: 10 INJECTION, EMULSION INTRAVENOUS at 11:31

## 2020-09-02 RX ADMIN — SODIUM CHLORIDE, POTASSIUM CHLORIDE, SODIUM LACTATE AND CALCIUM CHLORIDE: 600; 310; 30; 20 INJECTION, SOLUTION INTRAVENOUS at 14:28

## 2020-09-02 RX ADMIN — SUGAMMADEX 200 MG: 100 INJECTION, SOLUTION INTRAVENOUS at 13:50

## 2020-09-02 RX ADMIN — CEFAZOLIN SODIUM 2000 MG: 300 INJECTION, POWDER, LYOPHILIZED, FOR SOLUTION INTRAVENOUS at 20:16

## 2020-09-02 RX ADMIN — CEFAZOLIN SODIUM 2000 MG: 300 INJECTION, POWDER, LYOPHILIZED, FOR SOLUTION INTRAVENOUS at 11:54

## 2020-09-02 RX ADMIN — ASPIRIN 81 MG 81 MG: 81 TABLET ORAL at 19:00

## 2020-09-02 RX ADMIN — HYDROCODONE BITARTRATE AND ACETAMINOPHEN 1 TABLET: 5; 325 TABLET ORAL at 20:16

## 2020-09-02 RX ADMIN — ACETAMINOPHEN 1000 MG: 500 TABLET ORAL at 08:33

## 2020-09-02 RX ADMIN — DEXAMETHASONE SODIUM PHOSPHATE 8 MG: 4 INJECTION, SOLUTION INTRAMUSCULAR; INTRAVENOUS at 11:43

## 2020-09-02 RX ADMIN — CELECOXIB 200 MG: 200 CAPSULE ORAL at 08:41

## 2020-09-02 RX ADMIN — HYDROMORPHONE HYDROCHLORIDE 0.2 MG: 1 INJECTION, SOLUTION INTRAMUSCULAR; INTRAVENOUS; SUBCUTANEOUS at 14:38

## 2020-09-02 RX ADMIN — METOCLOPRAMIDE HYDROCHLORIDE 10 MG: 5 INJECTION INTRAMUSCULAR; INTRAVENOUS at 13:25

## 2020-09-02 RX ADMIN — TRANEXAMIC ACID 1000 MG: 100 INJECTION, SOLUTION INTRAVENOUS at 11:55

## 2020-09-02 RX ADMIN — ROCURONIUM BROMIDE 50 MG: 50 INJECTION, SOLUTION INTRAVENOUS at 12:08

## 2020-09-02 RX ADMIN — FENTANYL CITRATE 50 MCG: 50 INJECTION, SOLUTION INTRAMUSCULAR; INTRAVENOUS at 13:03

## 2020-09-02 RX ADMIN — FENTANYL CITRATE 100 MCG: 50 INJECTION, SOLUTION INTRAMUSCULAR; INTRAVENOUS at 11:30

## 2020-09-02 RX ADMIN — TRAZODONE HYDROCHLORIDE 200 MG: 100 TABLET ORAL at 20:16

## 2020-09-02 RX ADMIN — ROCURONIUM BROMIDE 50 MG: 50 INJECTION, SOLUTION INTRAVENOUS at 11:32

## 2020-09-02 RX ADMIN — ONDANSETRON 4 MG: 2 INJECTION INTRAMUSCULAR; INTRAVENOUS at 13:24

## 2020-09-02 RX ADMIN — LIDOCAINE HYDROCHLORIDE 100 MG: 20 INJECTION, SOLUTION INFILTRATION; PERINEURAL at 11:31

## 2020-09-02 RX ADMIN — GABAPENTIN 300 MG: 300 CAPSULE ORAL at 08:41

## 2020-09-02 RX ADMIN — SODIUM CHLORIDE, POTASSIUM CHLORIDE, SODIUM LACTATE AND CALCIUM CHLORIDE: 600; 310; 30; 20 INJECTION, SOLUTION INTRAVENOUS at 08:43

## 2020-09-02 RX ADMIN — SODIUM CHLORIDE, POTASSIUM CHLORIDE, SODIUM LACTATE AND CALCIUM CHLORIDE: 600; 310; 30; 20 INJECTION, SOLUTION INTRAVENOUS at 11:24

## 2020-09-02 SDOH — HEALTH STABILITY: MENTAL HEALTH: HOW MANY STANDARD DRINKS CONTAINING ALCOHOL DO YOU HAVE ON A TYPICAL DAY?: 1 OR 2

## 2020-09-02 SDOH — HEALTH STABILITY: MENTAL HEALTH: HOW OFTEN DO YOU HAVE 6 OR MORE DRINKS ON ONE OCCASION?: NEVER

## 2020-09-02 SDOH — HEALTH STABILITY: MENTAL HEALTH: HOW OFTEN DO YOU HAVE A DRINK CONTAINING ALCOHOL?: MONTHLY OR LESS

## 2020-09-02 ASSESSMENT — PAIN SCALES - GENERAL
PAINLEVEL_OUTOF10: 0
PAINLEVEL_OUTOF10: 6
PAINLEVEL_OUTOF10: 8
PAINLEVEL_OUTOF10: 9
PAINLEVEL_OUTOF10: 5
PAINLEVEL_OUTOF10: 8
PAINLEVEL_OUTOF10: 0
PAINLEVEL_OUTOF10: 4

## 2020-09-02 ASSESSMENT — COGNITIVE AND FUNCTIONAL STATUS - GENERAL
BECAUSE OF A PHYSICAL, MENTAL, OR EMOTIONAL CONDITION, DO YOU HAVE SERIOUS DIFFICULTY CONCENTRATING, REMEMBERING OR MAKING DECISIONS: NO
DO YOU HAVE SERIOUS DIFFICULTY WALKING OR CLIMBING STAIRS: YES
BASIC_MOBILITY_CONVERTED_SCORE: 39.67
DO YOU HAVE DIFFICULTY DRESSING OR BATHING: NO
ARE YOU BLIND OR DO YOU HAVE SERIOUS DIFFICULTY SEEING, EVEN WHEN WEARING GLASSES: NO
BASIC_MOBILITY_RAW_SCORE: 17
ARE YOU DEAF OR DO YOU HAVE SERIOUS DIFFICULTY  HEARING: NO
BECAUSE OF A PHYSICAL, MENTAL, OR EMOTIONAL CONDITION, DO YOU HAVE DIFFICULTY DOING ERRANDS ALONE: NO

## 2020-09-02 ASSESSMENT — PULMONARY FUNCTION TESTS
FEV1_PREDICTED: 91
FEV1: 2.37
FEV1/FVC: UNABLE TO OBTAIN, OR GREATER THAN 70%
FEV1_PREDICTED: 73
FEV1_PREDICTED: 108
FEV1/FVC_PERCENT_PREDICTED: 93
FEV1: 108
FEV1_PREDICTED: 88
FEV1: 108

## 2020-09-02 ASSESSMENT — LIFESTYLE VARIABLES
HOW OFTEN DO YOU HAVE A DRINK CONTAINING ALCOHOL: MONTHLY OR LESS
ALCOHOL_USE_STATUS: NO OR LOW RISK WITH VALIDATED TOOL
HOW OFTEN DO YOU HAVE 6 OR MORE DRINKS ON ONE OCCASION: NEVER
HOW MANY STANDARD DRINKS CONTAINING ALCOHOL DO YOU HAVE ON A TYPICAL DAY: 0,1 OR 2
AUDIT-C TOTAL SCORE: 1

## 2020-09-02 ASSESSMENT — ACTIVITIES OF DAILY LIVING (ADL)
MOBILITY_ASSIST_DEVICES: CANE;FRONT-WHEELED WALKER
CHRONIC_PAIN_PRESENT: NO
PRIOR_ADL_TOILETING: INDEPENDENT
PRIOR_ADL: INDEPENDENT
RECENT_DECLINE_ADL: NO
ADL_SHORT_OF_BREATH: YES
GROOMING: INDEPENDENT
EATING: INDEPENDENT
PRIOR_ADL_BATHING: INDEPENDENT
ADL_SCORE: 12
ADL_BEFORE_ADMISSION: INDEPENDENT

## 2020-09-02 ASSESSMENT — ENCOUNTER SYMPTOMS: EXERCISE TOLERANCE: GOOD (>4 METS)

## 2020-09-03 ENCOUNTER — APPOINTMENT (OUTPATIENT)
Dept: REHABILITATION | Age: 65
End: 2020-09-03

## 2020-09-03 LAB
ANION GAP SERPL CALC-SCNC: 10 MMOL/L (ref 10–20)
BASOPHILS # BLD: 0 K/MCL (ref 0–0.3)
BASOPHILS NFR BLD: 0 %
BUN SERPL-MCNC: 16 MG/DL (ref 6–20)
BUN/CREAT SERPL: 14 (ref 7–25)
CALCIUM SERPL-MCNC: 7.6 MG/DL (ref 8.4–10.2)
CHLORIDE SERPL-SCNC: 113 MMOL/L (ref 98–107)
CO2 SERPL-SCNC: 21 MMOL/L (ref 21–32)
CREAT SERPL-MCNC: 1.16 MG/DL (ref 0.51–0.95)
DEPRECATED RDW RBC: 49.3 FL (ref 39–50)
EOSINOPHIL # BLD: 0 K/MCL (ref 0.1–0.5)
EOSINOPHIL NFR BLD: 0 %
ERYTHROCYTE [DISTWIDTH] IN BLOOD: 12.8 % (ref 11–15)
FASTING DURATION TIME PATIENT: ABNORMAL H
GFR SERPLBLD BASED ON 1.73 SQ M-ARVRAT: 49 ML/MIN/1.73M2
GLUCOSE SERPL-MCNC: 102 MG/DL (ref 65–99)
HCT VFR BLD CALC: 28.7 % (ref 36–46.5)
HGB BLD-MCNC: 9.1 G/DL (ref 12–15.5)
IMM GRANULOCYTES # BLD AUTO: 0 K/MCL (ref 0–0.2)
IMM GRANULOCYTES # BLD: 0 %
LYMPHOCYTES # BLD: 1.6 K/MCL (ref 1–4)
LYMPHOCYTES NFR BLD: 30 %
MCH RBC QN AUTO: 33.6 PG (ref 26–34)
MCHC RBC AUTO-ENTMCNC: 31.7 G/DL (ref 32–36.5)
MCV RBC AUTO: 105.9 FL (ref 78–100)
MONOCYTES # BLD: 0.5 K/MCL (ref 0.3–0.9)
MONOCYTES NFR BLD: 10 %
NEUTROPHILS # BLD: 3.2 K/MCL (ref 1.8–7.7)
NEUTROPHILS NFR BLD: 60 %
NRBC BLD MANUAL-RTO: 0 /100 WBC
PLATELET # BLD AUTO: 155 K/MCL (ref 140–450)
POTASSIUM SERPL-SCNC: 3.9 MMOL/L (ref 3.4–5.1)
RBC # BLD: 2.71 MIL/MCL (ref 4–5.2)
SODIUM SERPL-SCNC: 140 MMOL/L (ref 135–145)
WBC # BLD: 5.4 K/MCL (ref 4.2–11)

## 2020-09-03 PROCEDURE — 97110 THERAPEUTIC EXERCISES: CPT

## 2020-09-03 PROCEDURE — 97530 THERAPEUTIC ACTIVITIES: CPT

## 2020-09-03 PROCEDURE — 10004172 HB COUNTER-THERAPY VISIT OT

## 2020-09-03 PROCEDURE — 80048 BASIC METABOLIC PNL TOTAL CA: CPT | Performed by: ORTHOPAEDIC SURGERY

## 2020-09-03 PROCEDURE — 97116 GAIT TRAINING THERAPY: CPT

## 2020-09-03 PROCEDURE — 10004651 HB RX, NO CHARGE ITEM: Performed by: ORTHOPAEDIC SURGERY

## 2020-09-03 PROCEDURE — 10002803 HB RX 637: Performed by: ORTHOPAEDIC SURGERY

## 2020-09-03 PROCEDURE — 36415 COLL VENOUS BLD VENIPUNCTURE: CPT | Performed by: ORTHOPAEDIC SURGERY

## 2020-09-03 PROCEDURE — 97165 OT EVAL LOW COMPLEX 30 MIN: CPT

## 2020-09-03 PROCEDURE — 10000002 HB ROOM CHARGE MED SURG

## 2020-09-03 PROCEDURE — 10002801 HB RX 250 W/O HCPCS: Performed by: ORTHOPAEDIC SURGERY

## 2020-09-03 PROCEDURE — 85025 COMPLETE CBC W/AUTO DIFF WBC: CPT | Performed by: ORTHOPAEDIC SURGERY

## 2020-09-03 PROCEDURE — 97535 SELF CARE MNGMENT TRAINING: CPT

## 2020-09-03 PROCEDURE — 10002800 HB RX 250 W HCPCS: Performed by: ORTHOPAEDIC SURGERY

## 2020-09-03 PROCEDURE — 10004173 HB COUNTER-THERAPY VISIT PT

## 2020-09-03 RX ORDER — HYDROCODONE BITARTRATE AND ACETAMINOPHEN 5; 325 MG/1; MG/1
1-2 TABLET ORAL EVERY 4 HOURS PRN
Qty: 45 TABLET | Refills: 0 | Status: SHIPPED | OUTPATIENT
Start: 2020-09-03 | End: 2020-09-04 | Stop reason: HOSPADM

## 2020-09-03 RX ORDER — ASPIRIN 81 MG/1
81 TABLET, CHEWABLE ORAL 2 TIMES DAILY
Status: SHIPPED | INPATIENT
Start: 2020-09-03 | End: 2020-09-04 | Stop reason: HOSPADM

## 2020-09-03 RX ORDER — DULOXETIN HYDROCHLORIDE 60 MG/1
120 CAPSULE, DELAYED RELEASE ORAL DAILY
COMMUNITY
End: 2020-09-15 | Stop reason: SDUPTHER

## 2020-09-03 RX ORDER — FUROSEMIDE 80 MG
80 TABLET ORAL DAILY
Status: DISCONTINUED | OUTPATIENT
Start: 2020-09-04 | End: 2020-09-04 | Stop reason: HOSPADM

## 2020-09-03 RX ORDER — DULOXETIN HYDROCHLORIDE 60 MG/1
60 CAPSULE, DELAYED RELEASE ORAL 2 TIMES DAILY
Status: DISCONTINUED | OUTPATIENT
Start: 2020-09-03 | End: 2020-09-03

## 2020-09-03 RX ORDER — DULOXETIN HYDROCHLORIDE 60 MG/1
120 CAPSULE, DELAYED RELEASE ORAL DAILY
Status: DISCONTINUED | OUTPATIENT
Start: 2020-09-04 | End: 2020-09-04 | Stop reason: HOSPADM

## 2020-09-03 RX ORDER — POLYETHYLENE GLYCOL 3350 17 G/17G
17 POWDER, FOR SOLUTION ORAL DAILY PRN
Status: ON HOLD | COMMUNITY
Start: 2020-09-03 | End: 2021-02-24

## 2020-09-03 RX ADMIN — BUDESONIDE AND FORMOTEROL FUMARATE DIHYDRATE 2 PUFF: 160; 4.5 AEROSOL RESPIRATORY (INHALATION) at 13:47

## 2020-09-03 RX ADMIN — OXYCODONE HYDROCHLORIDE 5 MG: 5 TABLET ORAL at 09:18

## 2020-09-03 RX ADMIN — KETOROLAC TROMETHAMINE 15 MG: 15 INJECTION, SOLUTION INTRAMUSCULAR; INTRAVENOUS at 00:39

## 2020-09-03 RX ADMIN — DEXAMETHASONE SODIUM PHOSPHATE 10 MG: 10 INJECTION, SOLUTION INTRAMUSCULAR; INTRAVENOUS at 05:43

## 2020-09-03 RX ADMIN — Medication 1 TABLET: at 09:20

## 2020-09-03 RX ADMIN — OXYCODONE HYDROCHLORIDE 5 MG: 5 TABLET ORAL at 15:27

## 2020-09-03 RX ADMIN — TOPIRAMATE 200 MG: 100 TABLET, FILM COATED ORAL at 09:21

## 2020-09-03 RX ADMIN — Medication 100 MG: at 09:20

## 2020-09-03 RX ADMIN — SERTRALINE HYDROCHLORIDE 100 MG: 100 TABLET, FILM COATED ORAL at 09:20

## 2020-09-03 RX ADMIN — ASPIRIN 81 MG 81 MG: 81 TABLET ORAL at 09:20

## 2020-09-03 RX ADMIN — FERROUS SULFATE TAB 325 MG (65 MG ELEMENTAL FE) 325 MG: 325 (65 FE) TAB at 09:20

## 2020-09-03 RX ADMIN — CEFAZOLIN SODIUM 2000 MG: 300 INJECTION, POWDER, LYOPHILIZED, FOR SOLUTION INTRAVENOUS at 05:41

## 2020-09-03 RX ADMIN — MODAFINIL 200 MG: 100 TABLET ORAL at 13:45

## 2020-09-03 RX ADMIN — TOPIRAMATE 400 MG: 100 TABLET, FILM COATED ORAL at 20:32

## 2020-09-03 RX ADMIN — Medication 25 MCG: at 09:20

## 2020-09-03 RX ADMIN — OXYCODONE HYDROCHLORIDE 5 MG: 5 TABLET ORAL at 13:45

## 2020-09-03 RX ADMIN — CETIRIZINE HYDROCHLORIDE 10 MG: 10 TABLET ORAL at 09:19

## 2020-09-03 RX ADMIN — MODAFINIL 200 MG: 100 TABLET ORAL at 06:48

## 2020-09-03 RX ADMIN — HYDROCODONE BITARTRATE AND ACETAMINOPHEN 1 TABLET: 5; 325 TABLET ORAL at 20:32

## 2020-09-03 RX ADMIN — LEVOTHYROXINE SODIUM 50 MCG: 50 TABLET ORAL at 09:20

## 2020-09-03 RX ADMIN — KETOROLAC TROMETHAMINE 15 MG: 15 INJECTION, SOLUTION INTRAMUSCULAR; INTRAVENOUS at 06:48

## 2020-09-03 RX ADMIN — GABAPENTIN 300 MG: 300 CAPSULE ORAL at 09:20

## 2020-09-03 RX ADMIN — FERROUS SULFATE TAB 325 MG (65 MG ELEMENTAL FE) 325 MG: 325 (65 FE) TAB at 13:45

## 2020-09-03 RX ADMIN — TRAZODONE HYDROCHLORIDE 200 MG: 100 TABLET ORAL at 20:32

## 2020-09-03 RX ADMIN — HYDROCODONE BITARTRATE AND ACETAMINOPHEN 1 TABLET: 5; 325 TABLET ORAL at 05:41

## 2020-09-03 RX ADMIN — FERROUS SULFATE TAB 325 MG (65 MG ELEMENTAL FE) 325 MG: 325 (65 FE) TAB at 20:32

## 2020-09-03 RX ADMIN — OXYCODONE HYDROCHLORIDE 10 MG: 5 TABLET ORAL at 19:39

## 2020-09-03 RX ADMIN — PANTOPRAZOLE SODIUM 40 MG: 40 TABLET, DELAYED RELEASE ORAL at 05:41

## 2020-09-03 RX ADMIN — HYDROCODONE BITARTRATE AND ACETAMINOPHEN 1 TABLET: 5; 325 TABLET ORAL at 00:39

## 2020-09-03 RX ADMIN — BUPROPION HYDROCHLORIDE 450 MG: 300 TABLET, EXTENDED RELEASE ORAL at 09:19

## 2020-09-03 RX ADMIN — ENOXAPARIN SODIUM 40 MG: 40 INJECTION SUBCUTANEOUS at 05:41

## 2020-09-03 ASSESSMENT — PAIN SCALES - WONG BAKER
WONGBAKER_NUMERICALRESPONSE: 0
WONGBAKER_NUMERICALRESPONSE: 7
WONGBAKER_NUMERICALRESPONSE: 0
WONGBAKER_NUMERICALRESPONSE: 8
WONGBAKER_NUMERICALRESPONSE: 0
WONGBAKER_NUMERICALRESPONSE: 0

## 2020-09-03 ASSESSMENT — PAIN SCALES - GENERAL
PAINLEVEL_OUTOF10: 6
PAINLEVEL_OUTOF10: 10
PAINLEVEL_OUTOF10: 10
PAINLEVEL_OUTOF10: 9
PAINLEVEL_OUTOF10: 10
PAINLEVEL_OUTOF10: 8
PAINLEVEL_OUTOF10: 9
PAINLEVEL_OUTOF10: 10
PAINLEVEL_OUTOF10: 8
PAINLEVEL_OUTOF10: 9
PAINLEVEL_OUTOF10: 3
PAINLEVEL_OUTOF10: 7
PAINLEVEL_OUTOF10: 9

## 2020-09-03 ASSESSMENT — ACTIVITIES OF DAILY LIVING (ADL)
PRIOR_ADL: INDEPENDENT
HOME_MANAGEMENT_TIME_ENTRY: 30

## 2020-09-03 ASSESSMENT — COGNITIVE AND FUNCTIONAL STATUS - GENERAL
BASIC_MOBILITY_CONVERTED_SCORE: 39.67
BASIC_MOBILITY_RAW_SCORE: 17

## 2020-09-04 ENCOUNTER — APPOINTMENT (OUTPATIENT)
Dept: REHABILITATION | Age: 65
End: 2020-09-04

## 2020-09-04 ENCOUNTER — NURSING HOME VISIT (OUTPATIENT)
Dept: POST ACUTE CARE | Age: 65
End: 2020-09-04

## 2020-09-04 VITALS
TEMPERATURE: 98.1 F | SYSTOLIC BLOOD PRESSURE: 118 MMHG | BODY MASS INDEX: 26.23 KG/M2 | DIASTOLIC BLOOD PRESSURE: 81 MMHG | HEIGHT: 65 IN | RESPIRATION RATE: 16 BRPM | HEART RATE: 82 BPM | OXYGEN SATURATION: 98 % | WEIGHT: 157.41 LBS

## 2020-09-04 DIAGNOSIS — N18.30 CKD (CHRONIC KIDNEY DISEASE) STAGE 3, GFR 30-59 ML/MIN (CMD): ICD-10-CM

## 2020-09-04 DIAGNOSIS — M16.12 OSTEOARTHRITIS OF LEFT HIP, UNSPECIFIED OSTEOARTHRITIS TYPE: Primary | ICD-10-CM

## 2020-09-04 DIAGNOSIS — D64.9 POSTOPERATIVE ANEMIA: ICD-10-CM

## 2020-09-04 DIAGNOSIS — Z96.642 HISTORY OF TOTAL HIP ARTHROPLASTY, LEFT: ICD-10-CM

## 2020-09-04 LAB
ANION GAP SERPL CALC-SCNC: 10 MMOL/L (ref 10–20)
BUN SERPL-MCNC: 16 MG/DL (ref 6–20)
BUN/CREAT SERPL: 17 (ref 7–25)
CALCIUM SERPL-MCNC: 8.2 MG/DL (ref 8.4–10.2)
CHLORIDE SERPL-SCNC: 117 MMOL/L (ref 98–107)
CO2 SERPL-SCNC: 19 MMOL/L (ref 21–32)
CREAT SERPL-MCNC: 0.96 MG/DL (ref 0.51–0.95)
DEPRECATED RDW RBC: 49.9 FL (ref 39–50)
ERYTHROCYTE [DISTWIDTH] IN BLOOD: 13.2 % (ref 11–15)
FASTING DURATION TIME PATIENT: ABNORMAL H
GFR SERPLBLD BASED ON 1.73 SQ M-ARVRAT: 62 ML/MIN/1.73M2
GLUCOSE SERPL-MCNC: 108 MG/DL (ref 65–99)
HCT VFR BLD CALC: 26.3 % (ref 36–46.5)
HGB BLD-MCNC: 8.4 G/DL (ref 12–15.5)
MCH RBC QN AUTO: 33.3 PG (ref 26–34)
MCHC RBC AUTO-ENTMCNC: 31.9 G/DL (ref 32–36.5)
MCV RBC AUTO: 104.4 FL (ref 78–100)
NRBC BLD MANUAL-RTO: 0 /100 WBC
PLATELET # BLD AUTO: 150 K/MCL (ref 140–450)
POTASSIUM SERPL-SCNC: 3.7 MMOL/L (ref 3.4–5.1)
RBC # BLD: 2.52 MIL/MCL (ref 4–5.2)
SODIUM SERPL-SCNC: 142 MMOL/L (ref 135–145)
WBC # BLD: 4.7 K/MCL (ref 4.2–11)

## 2020-09-04 PROCEDURE — 97530 THERAPEUTIC ACTIVITIES: CPT

## 2020-09-04 PROCEDURE — 10004651 HB RX, NO CHARGE ITEM: Performed by: ORTHOPAEDIC SURGERY

## 2020-09-04 PROCEDURE — 97535 SELF CARE MNGMENT TRAINING: CPT

## 2020-09-04 PROCEDURE — 10002803 HB RX 637: Performed by: PHYSICIAN ASSISTANT

## 2020-09-04 PROCEDURE — 10004173 HB COUNTER-THERAPY VISIT PT

## 2020-09-04 PROCEDURE — 10004325 HB COUNTER ASSESSMENT EA 15 MIN: Performed by: DIETITIAN, REGISTERED

## 2020-09-04 PROCEDURE — 97116 GAIT TRAINING THERAPY: CPT

## 2020-09-04 PROCEDURE — 10004172 HB COUNTER-THERAPY VISIT OT

## 2020-09-04 PROCEDURE — 10002803 HB RX 637: Performed by: ORTHOPAEDIC SURGERY

## 2020-09-04 PROCEDURE — 80048 BASIC METABOLIC PNL TOTAL CA: CPT | Performed by: ORTHOPAEDIC SURGERY

## 2020-09-04 PROCEDURE — 97110 THERAPEUTIC EXERCISES: CPT

## 2020-09-04 PROCEDURE — 85027 COMPLETE CBC AUTOMATED: CPT | Performed by: ORTHOPAEDIC SURGERY

## 2020-09-04 PROCEDURE — 10002800 HB RX 250 W HCPCS: Performed by: ORTHOPAEDIC SURGERY

## 2020-09-04 PROCEDURE — 36415 COLL VENOUS BLD VENIPUNCTURE: CPT | Performed by: ORTHOPAEDIC SURGERY

## 2020-09-04 PROCEDURE — 10002801 HB RX 250 W/O HCPCS: Performed by: ORTHOPAEDIC SURGERY

## 2020-09-04 PROCEDURE — 99306 1ST NF CARE HIGH MDM 50: CPT | Performed by: INTERNAL MEDICINE

## 2020-09-04 RX ORDER — OXYCODONE HYDROCHLORIDE 5 MG/1
5-10 TABLET ORAL EVERY 4 HOURS PRN
Qty: 60 TABLET | Refills: 0 | Status: SHIPPED | OUTPATIENT
Start: 2020-09-04 | End: 2020-09-08 | Stop reason: SDUPTHER

## 2020-09-04 RX ORDER — ASPIRIN 81 MG/1
81 TABLET, CHEWABLE ORAL DAILY
Qty: 90 TABLET | Refills: 3 | Status: ON HOLD | OUTPATIENT
Start: 2020-09-04 | End: 2021-02-25 | Stop reason: SDUPTHER

## 2020-09-04 RX ORDER — ENOXAPARIN SODIUM 100 MG/ML
40 INJECTION SUBCUTANEOUS DAILY
Qty: 10 SYRINGE | Refills: 0 | Status: SHIPPED | OUTPATIENT
Start: 2020-09-05 | End: 2020-09-15 | Stop reason: ALTCHOICE

## 2020-09-04 RX ORDER — OXYCODONE HYDROCHLORIDE 5 MG/1
5-10 TABLET ORAL EVERY 6 HOURS PRN
Qty: 40 TABLET | Refills: 0 | Status: SHIPPED | OUTPATIENT
Start: 2020-09-04 | End: 2020-09-04

## 2020-09-04 RX ORDER — OXYCODONE HYDROCHLORIDE 5 MG/1
5-10 TABLET ORAL EVERY 6 HOURS PRN
Qty: 40 TABLET | Refills: 0 | Status: SHIPPED | OUTPATIENT
Start: 2020-09-04 | End: 2020-09-04 | Stop reason: SDUPTHER

## 2020-09-04 RX ADMIN — OXYCODONE HYDROCHLORIDE 10 MG: 5 TABLET ORAL at 05:57

## 2020-09-04 RX ADMIN — MODAFINIL 200 MG: 100 TABLET ORAL at 13:25

## 2020-09-04 RX ADMIN — PANTOPRAZOLE SODIUM 40 MG: 40 TABLET, DELAYED RELEASE ORAL at 06:00

## 2020-09-04 RX ADMIN — Medication 100 MG: at 09:17

## 2020-09-04 RX ADMIN — DULOXETINE HYDROCHLORIDE 120 MG: 60 CAPSULE, DELAYED RELEASE ORAL at 09:18

## 2020-09-04 RX ADMIN — CYCLOBENZAPRINE HYDROCHLORIDE 5 MG: 10 TABLET, FILM COATED ORAL at 02:45

## 2020-09-04 RX ADMIN — OXYCODONE HYDROCHLORIDE 10 MG: 5 TABLET ORAL at 13:57

## 2020-09-04 RX ADMIN — MODAFINIL 200 MG: 100 TABLET ORAL at 06:00

## 2020-09-04 RX ADMIN — GABAPENTIN 300 MG: 300 CAPSULE ORAL at 09:16

## 2020-09-04 RX ADMIN — BUPROPION HYDROCHLORIDE 450 MG: 300 TABLET, EXTENDED RELEASE ORAL at 09:16

## 2020-09-04 RX ADMIN — OXYCODONE HYDROCHLORIDE 10 MG: 5 TABLET ORAL at 09:46

## 2020-09-04 RX ADMIN — HYDROCODONE BITARTRATE AND ACETAMINOPHEN 1 TABLET: 5; 325 TABLET ORAL at 13:25

## 2020-09-04 RX ADMIN — HYDROCODONE BITARTRATE AND ACETAMINOPHEN 1 TABLET: 5; 325 TABLET ORAL at 02:45

## 2020-09-04 RX ADMIN — Medication 1 TABLET: at 09:17

## 2020-09-04 RX ADMIN — Medication 25 MCG: at 09:16

## 2020-09-04 RX ADMIN — TOPIRAMATE 200 MG: 100 TABLET, FILM COATED ORAL at 09:16

## 2020-09-04 RX ADMIN — FUROSEMIDE 80 MG: 80 TABLET ORAL at 09:17

## 2020-09-04 RX ADMIN — SERTRALINE HYDROCHLORIDE 100 MG: 100 TABLET, FILM COATED ORAL at 09:17

## 2020-09-04 RX ADMIN — LEVOTHYROXINE SODIUM 50 MCG: 50 TABLET ORAL at 09:16

## 2020-09-04 RX ADMIN — CETIRIZINE HYDROCHLORIDE 10 MG: 10 TABLET ORAL at 09:16

## 2020-09-04 RX ADMIN — FERROUS SULFATE TAB 325 MG (65 MG ELEMENTAL FE) 325 MG: 325 (65 FE) TAB at 13:25

## 2020-09-04 RX ADMIN — OXYCODONE HYDROCHLORIDE 10 MG: 5 TABLET ORAL at 00:06

## 2020-09-04 RX ADMIN — ENOXAPARIN SODIUM 40 MG: 40 INJECTION SUBCUTANEOUS at 09:15

## 2020-09-04 RX ADMIN — HYDROCODONE BITARTRATE AND ACETAMINOPHEN 1 TABLET: 5; 325 TABLET ORAL at 09:15

## 2020-09-04 RX ADMIN — ASPIRIN 81 MG 81 MG: 81 TABLET ORAL at 09:16

## 2020-09-04 RX ADMIN — FERROUS SULFATE TAB 325 MG (65 MG ELEMENTAL FE) 325 MG: 325 (65 FE) TAB at 09:16

## 2020-09-04 ASSESSMENT — PAIN SCALES - GENERAL
PAINLEVEL_OUTOF10: 10
PAINLEVEL_OUTOF10: 7
PAINLEVEL_OUTOF10: 10
PAINLEVEL_OUTOF10: 8
PAINLEVEL_OUTOF10: 10

## 2020-09-04 ASSESSMENT — COGNITIVE AND FUNCTIONAL STATUS - GENERAL
HELP NEEDED FOR BATHING: A LITTLE
HELP NEEDED DRESSING REGULAR LOWER BODY CLOTHING: A LITTLE
HELP NEEDED DRESSING REGULAR UPPER BODY CLOTHING: A LITTLE
HELP NEEDED FOR PERSONAL GROOMING: A LITTLE
BASIC_MOBILITY_RAW_SCORE: 22
BASIC_MOBILITY_CONVERTED_SCORE: 47.40
HELP NEEDED FOR TOILETING: A LITTLE
DAILY_ACTIVITY_CONVERTED_SCORE: 40.22
DAILY_ACTIVITY_RAW_SCORE: 19

## 2020-09-04 ASSESSMENT — ACTIVITIES OF DAILY LIVING (ADL): HOME_MANAGEMENT_TIME_ENTRY: 30

## 2020-09-04 ASSESSMENT — PAIN SCALES - WONG BAKER: WONGBAKER_NUMERICALRESPONSE: 0

## 2020-09-05 SDOH — HEALTH STABILITY: MENTAL HEALTH: HOW OFTEN DO YOU HAVE A DRINK CONTAINING ALCOHOL?: MONTHLY OR LESS

## 2020-09-05 SDOH — HEALTH STABILITY: MENTAL HEALTH: HOW MANY STANDARD DRINKS CONTAINING ALCOHOL DO YOU HAVE ON A TYPICAL DAY?: 1 OR 2

## 2020-09-05 SDOH — HEALTH STABILITY: MENTAL HEALTH: HOW OFTEN DO YOU HAVE 6 OR MORE DRINKS ON ONE OCCASION?: NEVER

## 2020-09-06 DIAGNOSIS — Z72.820 SLEEP DEPRIVATION: ICD-10-CM

## 2020-09-06 RX ORDER — MODAFINIL 200 MG/1
200 TABLET ORAL 2 TIMES DAILY
Qty: 60 TABLET | Refills: 0 | Status: SHIPPED | OUTPATIENT
Start: 2020-09-06 | End: 2020-09-15 | Stop reason: SDUPTHER

## 2020-09-08 ENCOUNTER — NURSING HOME VISIT (OUTPATIENT)
Dept: POST ACUTE CARE | Age: 65
End: 2020-09-08

## 2020-09-08 DIAGNOSIS — Z96.642 HISTORY OF TOTAL HIP ARTHROPLASTY, LEFT: ICD-10-CM

## 2020-09-08 DIAGNOSIS — I10 ESSENTIAL HYPERTENSION: ICD-10-CM

## 2020-09-08 DIAGNOSIS — N18.30 CKD (CHRONIC KIDNEY DISEASE) STAGE 3, GFR 30-59 ML/MIN (CMD): ICD-10-CM

## 2020-09-08 DIAGNOSIS — K59.00 CONSTIPATION, UNSPECIFIED CONSTIPATION TYPE: ICD-10-CM

## 2020-09-08 DIAGNOSIS — F31.60 BIPOLAR AFFECTIVE DISORDER, CURRENT EPISODE MIXED, CURRENT EPISODE SEVERITY UNSPECIFIED (CMD): ICD-10-CM

## 2020-09-08 DIAGNOSIS — M16.12 OSTEOARTHRITIS OF LEFT HIP, UNSPECIFIED OSTEOARTHRITIS TYPE: Primary | ICD-10-CM

## 2020-09-08 DIAGNOSIS — D53.9 MACROCYTIC ANEMIA: ICD-10-CM

## 2020-09-08 DIAGNOSIS — G47.9 SLEEP DISORDER: ICD-10-CM

## 2020-09-08 DIAGNOSIS — D64.9 POSTOPERATIVE ANEMIA: ICD-10-CM

## 2020-09-08 DIAGNOSIS — R53.1 GENERALIZED WEAKNESS: ICD-10-CM

## 2020-09-08 PROCEDURE — 1125F AMNT PAIN NOTED PAIN PRSNT: CPT | Performed by: NURSE PRACTITIONER

## 2020-09-08 PROCEDURE — 1170F FXNL STATUS ASSESSED: CPT | Performed by: NURSE PRACTITIONER

## 2020-09-08 PROCEDURE — 1160F RVW MEDS BY RX/DR IN RCRD: CPT | Performed by: NURSE PRACTITIONER

## 2020-09-08 PROCEDURE — 99358 PROLONG SERVICE W/O CONTACT: CPT | Performed by: NURSE PRACTITIONER

## 2020-09-08 PROCEDURE — 1158F ADVNC CARE PLAN TLK DOCD: CPT | Performed by: NURSE PRACTITIONER

## 2020-09-08 PROCEDURE — 1159F MED LIST DOCD IN RCRD: CPT | Performed by: NURSE PRACTITIONER

## 2020-09-08 PROCEDURE — 1157F ADVNC CARE PLAN IN RCRD: CPT | Performed by: NURSE PRACTITIONER

## 2020-09-08 PROCEDURE — 99310 SBSQ NF CARE HIGH MDM 45: CPT | Performed by: NURSE PRACTITIONER

## 2020-09-08 RX ORDER — ACETAMINOPHEN 325 MG/1
650 TABLET ORAL EVERY 4 HOURS PRN
COMMUNITY
End: 2021-04-02 | Stop reason: ALTCHOICE

## 2020-09-08 RX ORDER — AMOXICILLIN 250 MG
1-2 CAPSULE ORAL NIGHTLY
COMMUNITY
End: 2021-01-13 | Stop reason: ALTCHOICE

## 2020-09-08 RX ORDER — FOLIC ACID 1 MG/1
1 TABLET ORAL DAILY
Status: ON HOLD | COMMUNITY
End: 2023-11-09

## 2020-09-08 RX ORDER — OXYCODONE HYDROCHLORIDE 5 MG/1
5-10 TABLET ORAL EVERY 4 HOURS PRN
Qty: 60 TABLET | Refills: 0 | Status: SHIPPED | OUTPATIENT
Start: 2020-09-08 | End: 2020-09-15 | Stop reason: SDUPTHER

## 2020-09-08 ASSESSMENT — ENCOUNTER SYMPTOMS
ABDOMINAL DISTENTION: 0
EYES NEGATIVE: 1
CONSTIPATION: 1
TROUBLE SWALLOWING: 0
SLEEP DISTURBANCE: 0
ANAL BLEEDING: 0
FEVER: 0
COLOR CHANGE: 0
WEAKNESS: 1
AGITATION: 1
UNEXPECTED WEIGHT CHANGE: 0
BLOOD IN STOOL: 0
RECTAL PAIN: 0
DIARRHEA: 0
ABDOMINAL PAIN: 1
ACTIVITY CHANGE: 1
APPETITE CHANGE: 0
SHORTNESS OF BREATH: 1
NAUSEA: 0
DIAPHORESIS: 0
FATIGUE: 0
CHILLS: 0
COUGH: 0
HEADACHES: 0
SORE THROAT: 0
BACK PAIN: 0
VOMITING: 0
LIGHT-HEADEDNESS: 0
CHEST TIGHTNESS: 0
DIZZINESS: 0
SINUS PRESSURE: 0
WOUND: 1
CONFUSION: 0

## 2020-09-08 ASSESSMENT — PAIN SCALES - GENERAL: PAINLEVEL: 9-10

## 2020-09-09 DIAGNOSIS — M79.604 BILATERAL LEG PAIN: ICD-10-CM

## 2020-09-09 DIAGNOSIS — M79.605 BILATERAL LEG PAIN: ICD-10-CM

## 2020-09-09 RX ORDER — GABAPENTIN 300 MG/1
CAPSULE ORAL
Qty: 30 CAPSULE | Refills: 3 | Status: SHIPPED | OUTPATIENT
Start: 2020-09-09 | End: 2021-01-11

## 2020-09-15 ENCOUNTER — OFFICE VISIT (OUTPATIENT)
Dept: ORTHOPEDICS | Age: 65
End: 2020-09-15

## 2020-09-15 ENCOUNTER — NURSING HOME VISIT (OUTPATIENT)
Dept: POST ACUTE CARE | Age: 65
End: 2020-09-15

## 2020-09-15 ENCOUNTER — IMAGING SERVICES (OUTPATIENT)
Dept: GENERAL RADIOLOGY | Age: 65
End: 2020-09-15
Attending: PHYSICIAN ASSISTANT

## 2020-09-15 DIAGNOSIS — K59.00 CONSTIPATION, UNSPECIFIED CONSTIPATION TYPE: ICD-10-CM

## 2020-09-15 DIAGNOSIS — Z96.642 STATUS POST LEFT HIP REPLACEMENT: ICD-10-CM

## 2020-09-15 DIAGNOSIS — Z96.642 STATUS POST LEFT HIP REPLACEMENT: Primary | ICD-10-CM

## 2020-09-15 DIAGNOSIS — F31.60 BIPOLAR AFFECTIVE DISORDER, CURRENT EPISODE MIXED, CURRENT EPISODE SEVERITY UNSPECIFIED (CMD): ICD-10-CM

## 2020-09-15 DIAGNOSIS — R53.1 GENERALIZED WEAKNESS: ICD-10-CM

## 2020-09-15 DIAGNOSIS — D53.9 MACROCYTIC ANEMIA: ICD-10-CM

## 2020-09-15 DIAGNOSIS — N18.30 CKD (CHRONIC KIDNEY DISEASE) STAGE 3, GFR 30-59 ML/MIN (CMD): ICD-10-CM

## 2020-09-15 DIAGNOSIS — M16.12 OSTEOARTHRITIS OF LEFT HIP, UNSPECIFIED OSTEOARTHRITIS TYPE: Primary | ICD-10-CM

## 2020-09-15 DIAGNOSIS — G47.9 SLEEP DISORDER: ICD-10-CM

## 2020-09-15 DIAGNOSIS — I10 ESSENTIAL HYPERTENSION: ICD-10-CM

## 2020-09-15 DIAGNOSIS — D64.9 POSTOPERATIVE ANEMIA: ICD-10-CM

## 2020-09-15 DIAGNOSIS — Z96.642 HISTORY OF TOTAL HIP ARTHROPLASTY, LEFT: ICD-10-CM

## 2020-09-15 DIAGNOSIS — Z72.820 SLEEP DEPRIVATION: ICD-10-CM

## 2020-09-15 PROCEDURE — 73501 X-RAY EXAM HIP UNI 1 VIEW: CPT | Performed by: RADIOLOGY

## 2020-09-15 PROCEDURE — 99024 POSTOP FOLLOW-UP VISIT: CPT | Performed by: PHYSICIAN ASSISTANT

## 2020-09-15 PROCEDURE — 99316 NF DSCHRG MGMT 30 MIN+: CPT | Performed by: NURSE PRACTITIONER

## 2020-09-15 RX ORDER — OXYCODONE HYDROCHLORIDE 5 MG/1
5-10 TABLET ORAL EVERY 4 HOURS PRN
Qty: 30 TABLET | Refills: 0 | Status: SHIPPED | OUTPATIENT
Start: 2020-09-15 | End: 2020-09-15 | Stop reason: ALTCHOICE

## 2020-09-15 RX ORDER — ALBUTEROL SULFATE 90 UG/1
2 AEROSOL, METERED RESPIRATORY (INHALATION) EVERY 4 HOURS PRN
Qty: 1 INHALER | Refills: 0 | Status: SHIPPED | OUTPATIENT
Start: 2020-09-15 | End: 2022-08-23 | Stop reason: SDUPTHER

## 2020-09-15 RX ORDER — OXYCODONE HYDROCHLORIDE AND ACETAMINOPHEN 5; 325 MG/1; MG/1
1-2 TABLET ORAL EVERY 4 HOURS PRN
COMMUNITY
End: 2020-09-15 | Stop reason: SDUPTHER

## 2020-09-15 RX ORDER — DULOXETIN HYDROCHLORIDE 60 MG/1
120 CAPSULE, DELAYED RELEASE ORAL DAILY
Qty: 60 CAPSULE | Refills: 0 | Status: SHIPPED | OUTPATIENT
Start: 2020-09-15 | End: 2020-12-11

## 2020-09-15 RX ORDER — QUINAPRIL 20 MG/1
20 TABLET ORAL DAILY
Qty: 30 TABLET | Refills: 0 | Status: SHIPPED | OUTPATIENT
Start: 2020-09-15 | End: 2021-04-05 | Stop reason: SDUPTHER

## 2020-09-15 RX ORDER — OXYCODONE HYDROCHLORIDE AND ACETAMINOPHEN 5; 325 MG/1; MG/1
1-2 TABLET ORAL EVERY 4 HOURS PRN
Qty: 30 TABLET | Refills: 0 | Status: SHIPPED | OUTPATIENT
Start: 2020-09-15 | End: 2020-09-22 | Stop reason: ALTCHOICE

## 2020-09-15 RX ORDER — MODAFINIL 200 MG/1
200 TABLET ORAL 2 TIMES DAILY
Qty: 14 TABLET | Refills: 0 | Status: SHIPPED | OUTPATIENT
Start: 2020-09-15 | End: 2020-09-28 | Stop reason: SDUPTHER

## 2020-09-15 RX ORDER — QUINAPRIL 20 MG/1
20 TABLET ORAL DAILY
COMMUNITY
End: 2020-09-15 | Stop reason: SDUPTHER

## 2020-09-15 SDOH — HEALTH STABILITY: MENTAL HEALTH: HOW MANY STANDARD DRINKS CONTAINING ALCOHOL DO YOU HAVE ON A TYPICAL DAY?: 1 OR 2

## 2020-09-15 SDOH — HEALTH STABILITY: MENTAL HEALTH: HOW OFTEN DO YOU HAVE A DRINK CONTAINING ALCOHOL?: MONTHLY OR LESS

## 2020-09-15 SDOH — HEALTH STABILITY: MENTAL HEALTH: HOW OFTEN DO YOU HAVE 6 OR MORE DRINKS ON ONE OCCASION?: NEVER

## 2020-09-15 ASSESSMENT — PAIN SCALES - GENERAL: PAINLEVEL: 9-10

## 2020-09-16 ENCOUNTER — TELEPHONE (OUTPATIENT)
Dept: FAMILY MEDICINE | Age: 65
End: 2020-09-16

## 2020-09-16 ENCOUNTER — TELEPHONE (OUTPATIENT)
Dept: POST ACUTE CARE | Age: 65
End: 2020-09-16

## 2020-09-18 ENCOUNTER — TELEPHONE (OUTPATIENT)
Dept: ORTHOPEDICS | Age: 65
End: 2020-09-18

## 2020-09-21 ENCOUNTER — TELEPHONE (OUTPATIENT)
Dept: ORTHOPEDICS | Age: 65
End: 2020-09-21

## 2020-09-21 DIAGNOSIS — Z96.642 STATUS POST LEFT HIP REPLACEMENT: Primary | ICD-10-CM

## 2020-09-21 RX ORDER — OXYCODONE HYDROCHLORIDE 5 MG/1
5 TABLET ORAL EVERY 6 HOURS PRN
Qty: 30 TABLET | Refills: 0 | Status: SHIPPED | OUTPATIENT
Start: 2020-09-21 | End: 2020-09-22 | Stop reason: SDUPTHER

## 2020-09-21 RX ORDER — OXYCODONE HYDROCHLORIDE 5 MG/1
5 TABLET ORAL EVERY 6 HOURS PRN
Qty: 30 TABLET | Refills: 0 | Status: SHIPPED | OUTPATIENT
Start: 2020-09-21 | End: 2020-09-21 | Stop reason: RX

## 2020-09-21 RX ORDER — OXYCODONE HYDROCHLORIDE 5 MG/1
5 TABLET ORAL EVERY 6 HOURS PRN
Qty: 30 TABLET | Refills: 0 | Status: CANCELLED | OUTPATIENT
Start: 2020-09-21

## 2020-09-21 RX ORDER — OXYCODONE HYDROCHLORIDE 5 MG/1
5 TABLET ORAL EVERY 6 HOURS PRN
Qty: 30 TABLET | Refills: 0 | Status: SHIPPED | OUTPATIENT
Start: 2020-09-21 | End: 2020-09-21 | Stop reason: SDUPTHER

## 2020-09-22 ENCOUNTER — OFFICE VISIT (OUTPATIENT)
Dept: FAMILY MEDICINE | Age: 65
End: 2020-09-22

## 2020-09-22 ENCOUNTER — LAB SERVICES (OUTPATIENT)
Dept: LAB | Age: 65
End: 2020-09-22

## 2020-09-22 ENCOUNTER — OFFICE VISIT (OUTPATIENT)
Dept: ORTHOPEDICS | Age: 65
End: 2020-09-22

## 2020-09-22 VITALS
WEIGHT: 150 LBS | TEMPERATURE: 98.9 F | HEART RATE: 71 BPM | SYSTOLIC BLOOD PRESSURE: 89 MMHG | HEIGHT: 65 IN | BODY MASS INDEX: 24.99 KG/M2 | DIASTOLIC BLOOD PRESSURE: 48 MMHG

## 2020-09-22 DIAGNOSIS — Z96.642 STATUS POST TOTAL REPLACEMENT OF LEFT HIP: ICD-10-CM

## 2020-09-22 DIAGNOSIS — Z23 NEED FOR VACCINATION: ICD-10-CM

## 2020-09-22 DIAGNOSIS — Z96.642 STATUS POST LEFT HIP REPLACEMENT: Primary | ICD-10-CM

## 2020-09-22 DIAGNOSIS — Z09 HOSPITAL DISCHARGE FOLLOW-UP: Primary | ICD-10-CM

## 2020-09-22 PROCEDURE — 99495 TRANSJ CARE MGMT MOD F2F 14D: CPT | Performed by: FAMILY MEDICINE

## 2020-09-22 PROCEDURE — 90662 IIV NO PRSV INCREASED AG IM: CPT | Performed by: FAMILY MEDICINE

## 2020-09-22 PROCEDURE — 36415 COLL VENOUS BLD VENIPUNCTURE: CPT | Performed by: INTERNAL MEDICINE

## 2020-09-22 PROCEDURE — G0008 ADMIN INFLUENZA VIRUS VAC: HCPCS | Performed by: FAMILY MEDICINE

## 2020-09-22 PROCEDURE — 99024 POSTOP FOLLOW-UP VISIT: CPT | Performed by: PHYSICIAN ASSISTANT

## 2020-09-22 PROCEDURE — 85025 COMPLETE CBC W/AUTO DIFF WBC: CPT | Performed by: CLINICAL MEDICAL LABORATORY

## 2020-09-22 RX ORDER — OXYCODONE HYDROCHLORIDE 5 MG/1
5 TABLET ORAL EVERY 6 HOURS PRN
Qty: 30 TABLET | Refills: 0 | Status: SHIPPED | OUTPATIENT
Start: 2020-09-26 | End: 2020-10-02 | Stop reason: SDUPTHER

## 2020-09-22 SDOH — HEALTH STABILITY: MENTAL HEALTH: HOW MANY STANDARD DRINKS CONTAINING ALCOHOL DO YOU HAVE ON A TYPICAL DAY?: 1 OR 2

## 2020-09-22 SDOH — HEALTH STABILITY: MENTAL HEALTH: HOW OFTEN DO YOU HAVE A DRINK CONTAINING ALCOHOL?: MONTHLY OR LESS

## 2020-09-22 SDOH — HEALTH STABILITY: MENTAL HEALTH: HOW OFTEN DO YOU HAVE 6 OR MORE DRINKS ON ONE OCCASION?: NEVER

## 2020-09-23 ENCOUNTER — CASE MANAGEMENT (OUTPATIENT)
Dept: CARE COORDINATION | Age: 65
End: 2020-09-23

## 2020-09-23 ENCOUNTER — TELEPHONE (OUTPATIENT)
Dept: INTERNAL MEDICINE | Age: 65
End: 2020-09-23

## 2020-09-23 LAB
BASOPHILS # BLD: 0.1 K/MCL (ref 0–0.3)
BASOPHILS NFR BLD: 1 %
DEPRECATED RDW RBC: 56.3 FL (ref 39–50)
EOSINOPHIL # BLD: 0.1 K/MCL (ref 0.1–0.5)
EOSINOPHIL NFR BLD: 2 %
ERYTHROCYTE [DISTWIDTH] IN BLOOD: 13.9 % (ref 11–15)
HCT VFR BLD CALC: 34.3 % (ref 36–46.5)
HGB BLD-MCNC: 10.3 G/DL (ref 12–15.5)
IMM GRANULOCYTES # BLD AUTO: 0 K/MCL (ref 0–0.2)
IMM GRANULOCYTES # BLD: 0 %
LYMPHOCYTES # BLD: 2 K/MCL (ref 1–4)
LYMPHOCYTES NFR BLD: 38 %
MCH RBC QN AUTO: 33.1 PG (ref 26–34)
MCHC RBC AUTO-ENTMCNC: 30 G/DL (ref 32–36.5)
MCV RBC AUTO: 110.3 FL (ref 78–100)
MONOCYTES # BLD: 0.4 K/MCL (ref 0.3–0.9)
MONOCYTES NFR BLD: 8 %
NEUTROPHILS # BLD: 2.6 K/MCL (ref 1.8–7.7)
NEUTROPHILS NFR BLD: 51 %
NRBC BLD MANUAL-RTO: 0 /100 WBC
PLATELET # BLD AUTO: 451 K/MCL (ref 140–450)
RBC # BLD: 3.11 MIL/MCL (ref 4–5.2)
WBC # BLD: 5.2 K/MCL (ref 4.2–11)

## 2020-09-23 ASSESSMENT — ACTIVITIES OF DAILY LIVING (ADL)
EATING: INDEPENDENT
DRESSING: INDEPENDENT
TOILETING: WITH DEVICE
DME_IN_USE: YES
FUNCTIONAL_EVALUATION: PERFORMS ADLS WITH ASSISTANCE
AMBULATION: WITH DEVICE
GROOMING: INDEPENDENT
DME_IN_USE: WALKER
BATHING: PARTIAL ASSIST

## 2020-09-24 ENCOUNTER — OFFICE VISIT (OUTPATIENT)
Dept: PULMONOLOGY | Age: 65
End: 2020-09-24
Attending: INTERNAL MEDICINE

## 2020-09-24 VITALS
HEART RATE: 73 BPM | WEIGHT: 150 LBS | RESPIRATION RATE: 20 BRPM | OXYGEN SATURATION: 100 % | DIASTOLIC BLOOD PRESSURE: 58 MMHG | BODY MASS INDEX: 24.96 KG/M2 | SYSTOLIC BLOOD PRESSURE: 110 MMHG

## 2020-09-24 DIAGNOSIS — Z72.820 SLEEP DEPRIVATION: ICD-10-CM

## 2020-09-24 DIAGNOSIS — Z87.891 HISTORY OF TOBACCO USE: Primary | ICD-10-CM

## 2020-09-24 PROCEDURE — 99211 OFF/OP EST MAY X REQ PHY/QHP: CPT

## 2020-09-28 DIAGNOSIS — Z72.820 SLEEP DEPRIVATION: ICD-10-CM

## 2020-09-28 RX ORDER — MODAFINIL 200 MG/1
200 TABLET ORAL 2 TIMES DAILY
Qty: 180 TABLET | Refills: 0 | Status: SHIPPED | OUTPATIENT
Start: 2020-09-28 | End: 2020-12-30

## 2020-09-30 ENCOUNTER — TELEPHONE (OUTPATIENT)
Dept: PULMONOLOGY | Age: 65
End: 2020-09-30

## 2020-10-02 DIAGNOSIS — Z96.642 STATUS POST LEFT HIP REPLACEMENT: ICD-10-CM

## 2020-10-02 RX ORDER — OXYCODONE HYDROCHLORIDE 5 MG/1
5 TABLET ORAL EVERY 6 HOURS PRN
Qty: 30 TABLET | Refills: 0 | Status: SHIPPED | OUTPATIENT
Start: 2020-10-02 | End: 2020-10-13 | Stop reason: SDUPTHER

## 2020-10-05 ENCOUNTER — CASE MANAGEMENT (OUTPATIENT)
Dept: CARE COORDINATION | Age: 65
End: 2020-10-05

## 2020-10-05 SDOH — ECONOMIC STABILITY: INCOME INSECURITY: HOW HARD IS IT FOR YOU TO PAY FOR THE VERY BASICS LIKE FOOD, HOUSING, MEDICAL CARE, AND HEATING?: NOT VERY HARD

## 2020-10-05 SDOH — ECONOMIC STABILITY: FOOD INSECURITY: WITHIN THE PAST 12 MONTHS, YOU WORRIED THAT YOUR FOOD WOULD RUN OUT BEFORE YOU GOT MONEY TO BUY MORE.: NEVER TRUE

## 2020-10-05 SDOH — ECONOMIC STABILITY: FOOD INSECURITY: WITHIN THE PAST 12 MONTHS, THE FOOD YOU BOUGHT JUST DIDN'T LAST AND YOU DIDN'T HAVE MONEY TO GET MORE.: NEVER TRUE

## 2020-10-05 SDOH — HEALTH STABILITY: MENTAL HEALTH
STRESS IS WHEN SOMEONE FEELS TENSE, NERVOUS, ANXIOUS, OR CAN'T SLEEP AT NIGHT BECAUSE THEIR MIND IS TROUBLED. HOW STRESSED ARE YOU?: TO SOME EXTENT

## 2020-10-05 SDOH — ECONOMIC STABILITY: TRANSPORTATION INSECURITY
IN THE PAST 12 MONTHS, HAS LACK OF TRANSPORTATION KEPT YOU FROM MEETINGS, WORK, OR FROM GETTING THINGS NEEDED FOR DAILY LIVING?: NO

## 2020-10-05 SDOH — ECONOMIC STABILITY: HOUSING INSECURITY: ARE YOU WORRIED ABOUT LOSING YOUR HOUSING?: NO

## 2020-10-05 SDOH — ECONOMIC STABILITY: TRANSPORTATION INSECURITY
IN THE PAST 12 MONTHS, HAS THE LACK OF TRANSPORTATION KEPT YOU FROM MEDICAL APPOINTMENTS OR FROM GETTING MEDICATIONS?: NO

## 2020-10-05 SDOH — ECONOMIC STABILITY: HOUSING INSECURITY: WHAT IS YOUR LIVING SITUATION TODAY?: I HAVE HOUSING

## 2020-10-07 ENCOUNTER — IMAGING SERVICES (OUTPATIENT)
Dept: CT IMAGING | Age: 65
End: 2020-10-07
Attending: INTERNAL MEDICINE

## 2020-10-07 DIAGNOSIS — Z87.891 HISTORY OF TOBACCO USE: ICD-10-CM

## 2020-10-07 PROCEDURE — G0297 LDCT FOR LUNG CA SCREEN: HCPCS | Performed by: RADIOLOGY

## 2020-10-13 ENCOUNTER — IMAGING SERVICES (OUTPATIENT)
Dept: GENERAL RADIOLOGY | Age: 65
End: 2020-10-13
Attending: ORTHOPAEDIC SURGERY

## 2020-10-13 ENCOUNTER — OFFICE VISIT (OUTPATIENT)
Dept: ORTHOPEDICS | Age: 65
End: 2020-10-13

## 2020-10-13 DIAGNOSIS — Z96.642 STATUS POST LEFT HIP REPLACEMENT: Primary | ICD-10-CM

## 2020-10-13 DIAGNOSIS — Z96.642 STATUS POST LEFT HIP REPLACEMENT: ICD-10-CM

## 2020-10-13 PROCEDURE — 73502 X-RAY EXAM HIP UNI 2-3 VIEWS: CPT | Performed by: RADIOLOGY

## 2020-10-13 PROCEDURE — 99024 POSTOP FOLLOW-UP VISIT: CPT | Performed by: ORTHOPAEDIC SURGERY

## 2020-10-13 RX ORDER — OXYCODONE HYDROCHLORIDE 5 MG/1
5 TABLET ORAL EVERY 8 HOURS PRN
Qty: 25 TABLET | Refills: 0 | Status: SHIPPED | OUTPATIENT
Start: 2020-10-13 | End: 2021-01-12 | Stop reason: SDUPTHER

## 2020-10-13 SDOH — HEALTH STABILITY: MENTAL HEALTH: HOW OFTEN DO YOU HAVE A DRINK CONTAINING ALCOHOL?: MONTHLY OR LESS

## 2020-10-13 SDOH — HEALTH STABILITY: MENTAL HEALTH: HOW MANY STANDARD DRINKS CONTAINING ALCOHOL DO YOU HAVE ON A TYPICAL DAY?: 1 OR 2

## 2020-10-13 SDOH — HEALTH STABILITY: MENTAL HEALTH: HOW OFTEN DO YOU HAVE 6 OR MORE DRINKS ON ONE OCCASION?: NEVER

## 2020-10-14 ENCOUNTER — CASE MANAGEMENT (OUTPATIENT)
Dept: CARE COORDINATION | Age: 65
End: 2020-10-14

## 2020-10-20 ENCOUNTER — OFFICE VISIT (OUTPATIENT)
Dept: REHABILITATION | Age: 65
End: 2020-10-20
Attending: ORTHOPAEDIC SURGERY

## 2020-10-20 DIAGNOSIS — Z96.642 STATUS POST LEFT HIP REPLACEMENT: Primary | ICD-10-CM

## 2020-10-20 PROCEDURE — 97110 THERAPEUTIC EXERCISES: CPT | Performed by: PHYSICAL THERAPIST

## 2020-10-20 PROCEDURE — 97162 PT EVAL MOD COMPLEX 30 MIN: CPT | Performed by: PHYSICAL THERAPIST

## 2020-10-20 ASSESSMENT — HOOS JR
RISING FROM SITTING: MODERATE
GOING UP OR DOWN STAIRS: MODERATE
BENDING TO THE FLOOR TO PICK UP OBJECT: SEVERE
LYING IN BED (TURNING OVER, MAINTAINING HIP POSITION): MODERATE
WALKING ON UNEVEN SURFACE: MODERATE
HOOS JR RAW SCORE: 13
HOOS JR CALCULATED SCORE: 49.86
SITTING: MODERATE

## 2020-10-20 ASSESSMENT — ENCOUNTER SYMPTOMS
PAIN SEVERITY NOW: 5
QUALITY: ACHE
ALLEVIATING FACTORS: REST
ALLEVIATING FACTORS: AVOIDING MOVEMENT IN INVOLVED AREA
PAIN SCALE AT LOWEST: 6
QUALITY: SHARP

## 2020-10-23 ENCOUNTER — CASE MANAGEMENT (OUTPATIENT)
Dept: CARE COORDINATION | Age: 65
End: 2020-10-23

## 2020-10-23 ENCOUNTER — OFFICE VISIT (OUTPATIENT)
Dept: REHABILITATION | Age: 65
End: 2020-10-23

## 2020-10-23 DIAGNOSIS — Z96.642 STATUS POST LEFT HIP REPLACEMENT: Primary | ICD-10-CM

## 2020-10-23 PROCEDURE — 97110 THERAPEUTIC EXERCISES: CPT | Performed by: PHYSICAL THERAPIST

## 2020-10-23 PROCEDURE — 97112 NEUROMUSCULAR REEDUCATION: CPT | Performed by: PHYSICAL THERAPIST

## 2020-10-23 ASSESSMENT — BALANCE ASSESSMENTS
REACHING FORWARD WITH OUTSTRETCHED ARM WHILE STANDING: CAN REACH FORWARD >5 CM SAFELY (2 INCHES)
SITTING WITH FEET SUPPORTED AND BACK UNSUPPORTED: ABLE TO SIT SAFELY FOR 2 MINUTES
STANDING UNSUPPORTED: ABLE TO STAND FOR 2 MINUTES WITH SUPERVISION
PICK UP OBJECT FROM THE FLOOR FROM A STANDING POSITION: UNABLE TO PICK UP AND NEEDS SUPERVISION WHILE TRYING
STANDING UNSUPPORTED WITH EYES CLOSED: ABLE TO STAND 10 SECONDS WITH SUPERVISION
STANDING ON ONE LEG: TRIES TO LIFT LEG, UNABLE TO HOLD 3 SECONDS, BUT REMAINS STANDING INDEPENDENTLY
STANDING UNSUPPORTED: ABLE TO PLACE FEET TOGETHER INDEPENDENTLY AND STAND 1 MINUTE SAFELY
STANDING TO SITTING: CONTROLS DESCENT BY USING HANDS
LOOK OVER LEFT AND RIGHT SHOULDERS WHILE STANDING: LOOKS BEHIND ONE SIDE ONLY OTHER SIDE SHOWS LESS WEIGHT SHIFT
PLACE ALTERNATE FOOT ON STEP OR STOOL WHILE STANDING UNSUPPORTED: ABLE TO COMPLETE 4 STEPS WITHOUT AID WITH SUPERVISION
LONG VERSION TOTAL SCORE (MAX 56): 35
STANDING UNSUPPORTED ONE FOOT IN FRONT: LOSES BALANCE WHILE STEPPING OR STANDING
TURN 360 DEGREES: ABLE TO TURN 360 DEGREES SAFELY BUT SLOWLY
SITTING TO STANDING: ABLE TO STAND INDEPENDENTLY USING HANDS
TRANSFERS: ABLE TO TRANSFER SAFELY WITH MINOR USE OF HANDS

## 2020-10-23 ASSESSMENT — ENCOUNTER SYMPTOMS: PAIN SEVERITY NOW: 3

## 2020-10-26 ENCOUNTER — LETT/CORR TRANS (OUTPATIENT)
Dept: ORTHOPEDICS | Age: 65
End: 2020-10-26

## 2020-10-27 ENCOUNTER — OFFICE VISIT (OUTPATIENT)
Dept: REHABILITATION | Age: 65
End: 2020-10-27

## 2020-10-27 DIAGNOSIS — Z96.642 STATUS POST LEFT HIP REPLACEMENT: Primary | ICD-10-CM

## 2020-10-27 DIAGNOSIS — Z96.642 STATUS POST LEFT HIP REPLACEMENT: ICD-10-CM

## 2020-10-27 PROCEDURE — 97110 THERAPEUTIC EXERCISES: CPT | Performed by: PHYSICAL THERAPY ASSISTANT

## 2020-10-27 RX ORDER — OXYCODONE HYDROCHLORIDE 5 MG/1
5 TABLET ORAL EVERY 8 HOURS PRN
Qty: 25 TABLET | Refills: 0 | Status: CANCELLED | OUTPATIENT
Start: 2020-10-27

## 2020-10-27 RX ORDER — HYDROCODONE BITARTRATE AND ACETAMINOPHEN 5; 325 MG/1; MG/1
1 TABLET ORAL EVERY 8 HOURS PRN
Qty: 30 TABLET | Refills: 0 | Status: SHIPPED | OUTPATIENT
Start: 2020-10-27 | End: 2021-01-13 | Stop reason: ALTCHOICE

## 2020-10-28 PROBLEM — Z96.642 STATUS POST LEFT HIP REPLACEMENT: Status: ACTIVE | Noted: 2020-10-28

## 2020-10-29 ENCOUNTER — TELEPHONE (OUTPATIENT)
Dept: ORTHOPEDICS | Age: 65
End: 2020-10-29

## 2020-10-29 ENCOUNTER — OFFICE VISIT (OUTPATIENT)
Dept: REHABILITATION | Age: 65
End: 2020-10-29

## 2020-10-29 DIAGNOSIS — M16.11 PRIMARY OSTEOARTHRITIS OF RIGHT HIP: ICD-10-CM

## 2020-10-29 DIAGNOSIS — Z96.642 STATUS POST LEFT HIP REPLACEMENT: Primary | ICD-10-CM

## 2020-10-29 PROCEDURE — 97140 MANUAL THERAPY 1/> REGIONS: CPT | Performed by: PHYSICAL THERAPIST

## 2020-10-29 PROCEDURE — 97112 NEUROMUSCULAR REEDUCATION: CPT | Performed by: PHYSICAL THERAPIST

## 2020-10-29 ASSESSMENT — ENCOUNTER SYMPTOMS
PAIN SEVERITY NOW: 4
QUALITY: ACHE
PAIN FREQUENCY: CONSTANT
PAIN SCALE AT LOWEST: 2
QUALITY: SORE

## 2020-11-03 ENCOUNTER — OFFICE VISIT (OUTPATIENT)
Dept: REHABILITATION | Age: 65
End: 2020-11-03

## 2020-11-03 DIAGNOSIS — Z96.642 STATUS POST LEFT HIP REPLACEMENT: Primary | ICD-10-CM

## 2020-11-03 PROCEDURE — 97110 THERAPEUTIC EXERCISES: CPT | Performed by: PHYSICAL THERAPIST

## 2020-11-03 PROCEDURE — 97112 NEUROMUSCULAR REEDUCATION: CPT | Performed by: PHYSICAL THERAPIST

## 2020-11-03 ASSESSMENT — ENCOUNTER SYMPTOMS
PAIN FREQUENCY: CONSTANT
PAIN SEVERITY NOW: 4

## 2020-11-05 ENCOUNTER — OFFICE VISIT (OUTPATIENT)
Dept: REHABILITATION | Age: 65
End: 2020-11-05

## 2020-11-05 DIAGNOSIS — Z96.642 STATUS POST LEFT HIP REPLACEMENT: Primary | ICD-10-CM

## 2020-11-05 PROCEDURE — 97110 THERAPEUTIC EXERCISES: CPT | Performed by: PHYSICAL THERAPIST

## 2020-11-05 PROCEDURE — 97112 NEUROMUSCULAR REEDUCATION: CPT | Performed by: PHYSICAL THERAPIST

## 2020-11-05 PROCEDURE — 97140 MANUAL THERAPY 1/> REGIONS: CPT | Performed by: PHYSICAL THERAPIST

## 2020-11-05 ASSESSMENT — ENCOUNTER SYMPTOMS: PAIN SEVERITY NOW: 6

## 2020-11-11 RX ORDER — BUPROPION HYDROCHLORIDE 150 MG/1
TABLET ORAL
Qty: 90 TABLET | Refills: 0 | Status: SHIPPED | OUTPATIENT
Start: 2020-11-11 | End: 2020-12-14

## 2020-11-17 ENCOUNTER — OFFICE VISIT (OUTPATIENT)
Dept: ORTHOPEDICS | Age: 65
End: 2020-11-17

## 2020-11-17 ENCOUNTER — IMAGING SERVICES (OUTPATIENT)
Dept: GENERAL RADIOLOGY | Age: 65
End: 2020-11-17
Attending: ORTHOPAEDIC SURGERY

## 2020-11-17 ENCOUNTER — TELEPHONE (OUTPATIENT)
Dept: ORTHOPEDICS | Age: 65
End: 2020-11-17

## 2020-11-17 ENCOUNTER — OFFICE VISIT (OUTPATIENT)
Dept: REHABILITATION | Age: 65
End: 2020-11-17

## 2020-11-17 DIAGNOSIS — Z96.642 STATUS POST LEFT HIP REPLACEMENT: Primary | ICD-10-CM

## 2020-11-17 DIAGNOSIS — S93.491A SPRAIN OF ANTERIOR TALOFIBULAR LIGAMENT OF RIGHT ANKLE, INITIAL ENCOUNTER: ICD-10-CM

## 2020-11-17 DIAGNOSIS — Z96.642 STATUS POST LEFT HIP REPLACEMENT: ICD-10-CM

## 2020-11-17 DIAGNOSIS — M25.571 RIGHT ANKLE PAIN, UNSPECIFIED CHRONICITY: Primary | ICD-10-CM

## 2020-11-17 DIAGNOSIS — M25.571 RIGHT ANKLE PAIN, UNSPECIFIED CHRONICITY: ICD-10-CM

## 2020-11-17 PROCEDURE — 99024 POSTOP FOLLOW-UP VISIT: CPT | Performed by: PHYSICIAN ASSISTANT

## 2020-11-17 PROCEDURE — 73610 X-RAY EXAM OF ANKLE: CPT | Performed by: RADIOLOGY

## 2020-11-17 PROCEDURE — L4361 PNEUMA/VAC WALK BOOT PRE OTS: HCPCS | Performed by: PHYSICIAN ASSISTANT

## 2020-11-17 SDOH — ECONOMIC STABILITY: INCOME INSECURITY: HOW HARD IS IT FOR YOU TO PAY FOR THE VERY BASICS LIKE FOOD, HOUSING, MEDICAL CARE, AND HEATING?: NOT VERY HARD

## 2020-11-17 SDOH — ECONOMIC STABILITY: FOOD INSECURITY: WITHIN THE PAST 12 MONTHS, THE FOOD YOU BOUGHT JUST DIDN'T LAST AND YOU DIDN'T HAVE MONEY TO GET MORE.: NEVER TRUE

## 2020-11-17 SDOH — HEALTH STABILITY: MENTAL HEALTH: HOW OFTEN DO YOU HAVE A DRINK CONTAINING ALCOHOL?: MONTHLY OR LESS

## 2020-11-17 SDOH — HEALTH STABILITY: MENTAL HEALTH: HOW OFTEN DO YOU HAVE 6 OR MORE DRINKS ON ONE OCCASION?: NEVER

## 2020-11-17 SDOH — ECONOMIC STABILITY: FOOD INSECURITY: WITHIN THE PAST 12 MONTHS, YOU WORRIED THAT YOUR FOOD WOULD RUN OUT BEFORE YOU GOT MONEY TO BUY MORE.: NEVER TRUE

## 2020-11-17 SDOH — HEALTH STABILITY: MENTAL HEALTH: HOW MANY STANDARD DRINKS CONTAINING ALCOHOL DO YOU HAVE ON A TYPICAL DAY?: 1 OR 2

## 2020-11-17 ASSESSMENT — ENCOUNTER SYMPTOMS: PAIN SEVERITY NOW: 9

## 2020-11-24 ENCOUNTER — APPOINTMENT (OUTPATIENT)
Dept: ORTHOPEDICS | Age: 65
End: 2020-11-24

## 2020-11-24 ENCOUNTER — TELEPHONE (OUTPATIENT)
Dept: ORTHOPEDICS | Age: 65
End: 2020-11-24

## 2020-12-11 RX ORDER — DULOXETIN HYDROCHLORIDE 60 MG/1
CAPSULE, DELAYED RELEASE ORAL
Qty: 90 CAPSULE | Refills: 0 | Status: SHIPPED | OUTPATIENT
Start: 2020-12-11 | End: 2021-03-12

## 2020-12-14 ENCOUNTER — TELEPHONE (OUTPATIENT)
Dept: FAMILY MEDICINE | Age: 65
End: 2020-12-14

## 2020-12-14 RX ORDER — BUPROPION HYDROCHLORIDE 150 MG/1
TABLET ORAL
Qty: 90 TABLET | Refills: 0 | Status: SHIPPED | OUTPATIENT
Start: 2020-12-14 | End: 2021-01-11

## 2020-12-18 ENCOUNTER — APPOINTMENT (OUTPATIENT)
Dept: GENERAL RADIOLOGY | Age: 65
End: 2020-12-18
Attending: EMERGENCY MEDICINE

## 2020-12-18 ENCOUNTER — HOSPITAL ENCOUNTER (EMERGENCY)
Age: 65
Discharge: HOME | End: 2020-12-18
Attending: EMERGENCY MEDICINE

## 2020-12-18 ENCOUNTER — TELEPHONE (OUTPATIENT)
Dept: ORTHOPEDICS | Age: 65
End: 2020-12-18

## 2020-12-18 VITALS
BODY MASS INDEX: 27.18 KG/M2 | SYSTOLIC BLOOD PRESSURE: 107 MMHG | DIASTOLIC BLOOD PRESSURE: 56 MMHG | HEIGHT: 65 IN | WEIGHT: 163.14 LBS | RESPIRATION RATE: 18 BRPM | HEART RATE: 64 BPM | TEMPERATURE: 98.7 F | OXYGEN SATURATION: 97 %

## 2020-12-18 DIAGNOSIS — S73.035A ANTERIOR DISLOCATION OF LEFT HIP, INITIAL ENCOUNTER (CMD): Primary | ICD-10-CM

## 2020-12-18 LAB
ANION GAP BLD CALC-SCNC: 17 MMOL/L (ref 10–20)
BUN BLD-MCNC: 16 MG/DL (ref 6–20)
CA-I BLD-SCNC: 1.21 MMOL/L (ref 1.15–1.29)
CHLORIDE BLD-SCNC: 105 MMOL/L (ref 98–107)
CO2 BLD-SCNC: 22 MMOL/L (ref 19–24)
CREAT SERPL-MCNC: 1.2 MG/DL (ref 0.51–0.95)
GFR SERPLBLD BASED ON 1.73 SQ M-ARVRAT: 47 ML/MIN/1.73M2
GLUCOSE BLD-MCNC: 82 MG/DL (ref 70–99)
HCT VFR BLD CALC: 35 % (ref 36–46.5)
HGB BLD CALC-MCNC: 11.9 G/DL (ref 12–15.5)
POTASSIUM BLD-SCNC: 3.9 MMOL/L (ref 3.4–5.1)
SODIUM BLD-SCNC: 140 MMOL/L (ref 135–145)

## 2020-12-18 PROCEDURE — 93005 ELECTROCARDIOGRAM TRACING: CPT | Performed by: EMERGENCY MEDICINE

## 2020-12-18 PROCEDURE — 96375 TX/PRO/DX INJ NEW DRUG ADDON: CPT

## 2020-12-18 PROCEDURE — 80047 BASIC METABLC PNL IONIZED CA: CPT

## 2020-12-18 PROCEDURE — 10002800 HB RX 250 W HCPCS: Performed by: EMERGENCY MEDICINE

## 2020-12-18 PROCEDURE — 96374 THER/PROPH/DIAG INJ IV PUSH: CPT

## 2020-12-18 PROCEDURE — 73502 X-RAY EXAM HIP UNI 2-3 VIEWS: CPT | Performed by: RADIOLOGY

## 2020-12-18 PROCEDURE — 72170 X-RAY EXAM OF PELVIS: CPT

## 2020-12-18 PROCEDURE — 73502 X-RAY EXAM HIP UNI 2-3 VIEWS: CPT

## 2020-12-18 PROCEDURE — L1830 KO IMMOB CANVAS LONG PRE OTS: HCPCS | Performed by: EMERGENCY MEDICINE

## 2020-12-18 PROCEDURE — 10004651 HB RX, NO CHARGE ITEM: Performed by: EMERGENCY MEDICINE

## 2020-12-18 PROCEDURE — 10003573 HB TREAT DISLOCATED HIP

## 2020-12-18 PROCEDURE — 36415 COLL VENOUS BLD VENIPUNCTURE: CPT

## 2020-12-18 PROCEDURE — 99284 EMERGENCY DEPT VISIT MOD MDM: CPT

## 2020-12-18 RX ORDER — 0.9 % SODIUM CHLORIDE 0.9 %
2 VIAL (ML) INJECTION EVERY 12 HOURS SCHEDULED
Status: DISCONTINUED | OUTPATIENT
Start: 2020-12-18 | End: 2020-12-18 | Stop reason: HOSPADM

## 2020-12-18 RX ORDER — PROPOFOL 10 MG/ML
INJECTION, EMULSION INTRAVENOUS
Status: DISCONTINUED
Start: 2020-12-18 | End: 2020-12-18 | Stop reason: HOSPADM

## 2020-12-18 RX ORDER — PROPOFOL 10 MG/ML
INJECTION, EMULSION INTRAVENOUS PRN
Status: COMPLETED | OUTPATIENT
Start: 2020-12-18 | End: 2020-12-18

## 2020-12-18 RX ADMIN — FENTANYL CITRATE 50 MCG: 50 INJECTION, SOLUTION INTRAMUSCULAR; INTRAVENOUS at 15:55

## 2020-12-18 RX ADMIN — HYDROMORPHONE HYDROCHLORIDE 1 MG: 1 INJECTION, SOLUTION INTRAMUSCULAR; INTRAVENOUS; SUBCUTANEOUS at 14:43

## 2020-12-18 RX ADMIN — SODIUM CHLORIDE 2 ML: 9 INJECTION, SOLUTION INTRAMUSCULAR; INTRAVENOUS; SUBCUTANEOUS at 14:43

## 2020-12-18 RX ADMIN — HYDROMORPHONE HYDROCHLORIDE 1 MG: 1 INJECTION, SOLUTION INTRAMUSCULAR; INTRAVENOUS; SUBCUTANEOUS at 15:08

## 2020-12-18 RX ADMIN — PROPOFOL 20 MG: 10 INJECTION, EMULSION INTRAVENOUS at 15:57

## 2020-12-18 RX ADMIN — PROPOFOL 20 MG: 10 INJECTION, EMULSION INTRAVENOUS at 16:00

## 2020-12-18 RX ADMIN — PROPOFOL 20 MG: 10 INJECTION, EMULSION INTRAVENOUS at 15:59

## 2020-12-18 SDOH — HEALTH STABILITY: MENTAL HEALTH: HOW OFTEN DO YOU HAVE A DRINK CONTAINING ALCOHOL?: MONTHLY OR LESS

## 2020-12-18 SDOH — HEALTH STABILITY: MENTAL HEALTH: HOW MANY STANDARD DRINKS CONTAINING ALCOHOL DO YOU HAVE ON A TYPICAL DAY?: 1 OR 2

## 2020-12-18 SDOH — HEALTH STABILITY: MENTAL HEALTH: HOW OFTEN DO YOU HAVE 6 OR MORE DRINKS ON ONE OCCASION?: NEVER

## 2020-12-18 ASSESSMENT — ENCOUNTER SYMPTOMS
ABDOMINAL PAIN: 0
SORE THROAT: 0
CHILLS: 0
DIAPHORESIS: 0
DIZZINESS: 0
NAUSEA: 0
VOMITING: 0
WEAKNESS: 0
DIARRHEA: 0
CONFUSION: 0
BRUISES/BLEEDS EASILY: 0
NUMBNESS: 0
SHORTNESS OF BREATH: 0
FEVER: 0

## 2020-12-18 ASSESSMENT — PAIN DESCRIPTION - PAIN TYPE
TYPE: ACUTE PAIN

## 2020-12-18 ASSESSMENT — LIFESTYLE VARIABLES: SMOKING_HISTORY: NO

## 2020-12-18 ASSESSMENT — MOVEMENT AND STRENGTH ASSESSMENTS
ALL_EXTREMITIES: EQUAL STRENGTH/TONE/MOVEMENT
HEAD_NECK: FULL RANGE OF MOTION
ALL_EXTREMITIES: EQUAL STRENGTH/TONE/MOVEMENT
FACE_JAW: FACE SYMMETRICAL;FULL RANGE OF MOTION;TONGUE MIDLINE
HEAD_NECK: FULL RANGE OF MOTION
FACE_JAW: FACE SYMMETRICAL;TONGUE MIDLINE;FULL RANGE OF MOTION

## 2020-12-18 ASSESSMENT — PAIN SCALES - GENERAL
PAINLEVEL_OUTOF10: 9
PAINLEVEL_OUTOF10: 9
PAINLEVEL_OUTOF10: 3
PAINLEVEL_OUTOF10: 4
PAINLEVEL_OUTOF10: 8
PAINLEVEL_OUTOF10: 10
PAINLEVEL_OUTOF10: 3

## 2020-12-19 LAB
ATRIAL RATE (BPM): 61
DIASTOLIC BLOOD PRESSURE: 56
P AXIS (DEGREES): 54
PR-INTERVAL (MSEC): 170
QRS-INTERVAL (MSEC): 100
QT-INTERVAL (MSEC): 408
QTC: 411
R AXIS (DEGREES): 11
REPORT TEXT: NORMAL
SYSTOLIC BLOOD PRESSURE: 101
T AXIS (DEGREES): 47
VENTRICULAR RATE EKG/MIN (BPM): 61

## 2020-12-21 ENCOUNTER — TELEPHONE (OUTPATIENT)
Dept: FAMILY MEDICINE | Age: 65
End: 2020-12-21

## 2020-12-21 ENCOUNTER — CASE MANAGEMENT (OUTPATIENT)
Dept: CARE COORDINATION | Age: 65
End: 2020-12-21

## 2020-12-22 ENCOUNTER — OFFICE VISIT (OUTPATIENT)
Dept: ORTHOPEDICS | Age: 65
End: 2020-12-22

## 2020-12-22 DIAGNOSIS — S93.491A SPRAIN OF ANTERIOR TALOFIBULAR LIGAMENT OF RIGHT ANKLE, INITIAL ENCOUNTER: ICD-10-CM

## 2020-12-22 DIAGNOSIS — Z96.642 STATUS POST LEFT HIP REPLACEMENT: Primary | ICD-10-CM

## 2020-12-22 DIAGNOSIS — T84.029D DISLOCATION OF PROSTHETIC JOINT, SUBSEQUENT ENCOUNTER: ICD-10-CM

## 2020-12-22 PROCEDURE — 99213 OFFICE O/P EST LOW 20 MIN: CPT | Performed by: ORTHOPAEDIC SURGERY

## 2020-12-22 SDOH — HEALTH STABILITY: MENTAL HEALTH: HOW OFTEN DO YOU HAVE 6 OR MORE DRINKS ON ONE OCCASION?: NEVER

## 2020-12-22 SDOH — ECONOMIC STABILITY: INCOME INSECURITY: HOW HARD IS IT FOR YOU TO PAY FOR THE VERY BASICS LIKE FOOD, HOUSING, MEDICAL CARE, AND HEATING?: NOT VERY HARD

## 2020-12-22 SDOH — HEALTH STABILITY: MENTAL HEALTH: HOW MANY STANDARD DRINKS CONTAINING ALCOHOL DO YOU HAVE ON A TYPICAL DAY?: 1 OR 2

## 2020-12-22 SDOH — HEALTH STABILITY: MENTAL HEALTH: HOW OFTEN DO YOU HAVE A DRINK CONTAINING ALCOHOL?: MONTHLY OR LESS

## 2020-12-22 SDOH — ECONOMIC STABILITY: FOOD INSECURITY: WITHIN THE PAST 12 MONTHS, YOU WORRIED THAT YOUR FOOD WOULD RUN OUT BEFORE YOU GOT MONEY TO BUY MORE.: NEVER TRUE

## 2020-12-22 SDOH — ECONOMIC STABILITY: FOOD INSECURITY: WITHIN THE PAST 12 MONTHS, THE FOOD YOU BOUGHT JUST DIDN'T LAST AND YOU DIDN'T HAVE MONEY TO GET MORE.: NEVER TRUE

## 2020-12-29 RX ORDER — CARIPRAZINE 3 MG/1
3 CAPSULE, GELATIN COATED ORAL DAILY
Qty: 30 CAPSULE | OUTPATIENT
Start: 2020-12-29

## 2020-12-30 DIAGNOSIS — Z72.820 SLEEP DEPRIVATION: ICD-10-CM

## 2020-12-30 RX ORDER — MODAFINIL 200 MG/1
TABLET ORAL
Qty: 180 TABLET | Refills: 1 | Status: SHIPPED | OUTPATIENT
Start: 2020-12-30 | End: 2021-04-12 | Stop reason: SDUPTHER

## 2020-12-31 ENCOUNTER — CASE MANAGEMENT (OUTPATIENT)
Dept: CARE COORDINATION | Age: 65
End: 2020-12-31

## 2021-01-04 ENCOUNTER — TELEPHONE (OUTPATIENT)
Dept: FAMILY MEDICINE | Age: 66
End: 2021-01-04

## 2021-01-04 ENCOUNTER — TELEPHONE (OUTPATIENT)
Dept: PULMONOLOGY | Age: 66
End: 2021-01-04

## 2021-01-05 ENCOUNTER — CASE MANAGEMENT (OUTPATIENT)
Dept: CARE COORDINATION | Age: 66
End: 2021-01-05

## 2021-01-05 SDOH — SOCIAL STABILITY: SOCIAL INSECURITY
WITHIN THE LAST YEAR, HAVE YOU BEEN KICKED, HIT, SLAPPED, OR OTHERWISE PHYSICALLY HURT BY YOUR PARTNER OR EX-PARTNER?: NO

## 2021-01-05 SDOH — ECONOMIC STABILITY: FOOD INSECURITY: WITHIN THE PAST 12 MONTHS, THE FOOD YOU BOUGHT JUST DIDN'T LAST AND YOU DIDN'T HAVE MONEY TO GET MORE.: NEVER TRUE

## 2021-01-05 SDOH — ECONOMIC STABILITY: HOUSING INSECURITY: ARE YOU WORRIED ABOUT LOSING YOUR HOUSING?: NO

## 2021-01-05 SDOH — SOCIAL STABILITY: SOCIAL NETWORK
DO YOU BELONG TO ANY CLUBS OR ORGANIZATIONS SUCH AS CHURCH GROUPS UNIONS, FRATERNAL OR ATHLETIC GROUPS, OR SCHOOL GROUPS?: NO

## 2021-01-05 SDOH — SOCIAL STABILITY: SOCIAL INSECURITY
WITHIN THE LAST YEAR, HAVE TO BEEN RAPED OR FORCED TO HAVE ANY KIND OF SEXUAL ACTIVITY BY YOUR PARTNER OR EX-PARTNER?: NO

## 2021-01-05 SDOH — SOCIAL STABILITY: SOCIAL NETWORK: ARE YOU MARRIED, WIDOWED, DIVORCED, SEPARATED, NEVER MARRIED, OR LIVING WITH A PARTNER?: DIVORCED

## 2021-01-05 SDOH — ECONOMIC STABILITY: INCOME INSECURITY: HOW HARD IS IT FOR YOU TO PAY FOR THE VERY BASICS LIKE FOOD, HOUSING, MEDICAL CARE, AND HEATING?: NOT VERY HARD

## 2021-01-05 SDOH — SOCIAL STABILITY: SOCIAL INSECURITY: WITHIN THE LAST YEAR, HAVE YOU BEEN AFRAID OF YOUR PARTNER OR EX-PARTNER?: NO

## 2021-01-05 SDOH — SOCIAL STABILITY: SOCIAL NETWORK: HOW OFTEN DO YOU ATTEND CHURCH OR RELIGIOUS SERVICES?: 1 TO 4 TIMES PER YEAR

## 2021-01-05 SDOH — SOCIAL STABILITY: SOCIAL NETWORK: HOW OFTEN DO YOU ATTENT MEETINGS OF THE CLUB OR ORGANIZATION YOU BELONG TO?: NEVER

## 2021-01-05 SDOH — ECONOMIC STABILITY: FOOD INSECURITY: WITHIN THE PAST 12 MONTHS, YOU WORRIED THAT YOUR FOOD WOULD RUN OUT BEFORE YOU GOT MONEY TO BUY MORE.: NEVER TRUE

## 2021-01-05 SDOH — HEALTH STABILITY: MENTAL HEALTH: HOW OFTEN DO YOU HAVE A DRINK CONTAINING ALCOHOL?: MONTHLY OR LESS

## 2021-01-05 SDOH — SOCIAL STABILITY: SOCIAL INSECURITY: WITHIN THE LAST YEAR, HAVE YOU BEEN HUMILIATED OR EMOTIONALLY ABUSED IN OTHER WAYS BY YOUR PARTNER OR EX-PARTNER?: NO

## 2021-01-05 SDOH — HEALTH STABILITY: PHYSICAL HEALTH: ON AVERAGE, HOW MANY MINUTES DO YOU ENGAGE IN EXERCISE AT THIS LEVEL?: 20 MIN

## 2021-01-05 SDOH — SOCIAL STABILITY: SOCIAL NETWORK
IN A TYPICAL WEEK, HOW MANY TIMES DO YOU TALK ON THE PHONE WITH FAMILY, FRIENDS, OR NEIGHBORS?: MORE THAN THREE TIMES A WEEK

## 2021-01-05 SDOH — HEALTH STABILITY: MENTAL HEALTH: HOW OFTEN DO YOU HAVE 6 OR MORE DRINKS ON ONE OCCASION?: NEVER

## 2021-01-05 SDOH — HEALTH STABILITY: MENTAL HEALTH: HOW MANY STANDARD DRINKS CONTAINING ALCOHOL DO YOU HAVE ON A TYPICAL DAY?: 1 OR 2

## 2021-01-05 SDOH — ECONOMIC STABILITY: HOUSING INSECURITY: WHAT IS YOUR LIVING SITUATION TODAY?: I HAVE HOUSING

## 2021-01-05 SDOH — SOCIAL STABILITY: SOCIAL NETWORK: HOW OFTEN DO YOU GET TOGETHER WITH FRIENDS OR RELATIVES?: MORE THAN THREE TIMES A WEEK

## 2021-01-05 SDOH — HEALTH STABILITY: PHYSICAL HEALTH: ON AVERAGE, HOW MANY DAYS PER WEEK DO YOU ENGAGE IN MODERATE TO STRENUOUS EXERCISE (LIKE A BRISK WALK)?: 6 DAYS

## 2021-01-05 ASSESSMENT — ACTIVITIES OF DAILY LIVING (ADL)
FUNCTIONAL_EVALUATION: PERFORMS ADLS WITH ASSISTANCE
TOILETING: PARTIAL ASSIST
DME_IN_USE: YES
EATING: INDEPENDENT
MOBILITY: GAIT IMPAIRMENT;IMPAIRED MUSCLE TONE
DRESSING: PARTIAL ASSIST
GROOMING: PARTIAL ASSIST
BATHING: PARTIAL ASSIST
AMBULATION: PARTIAL ASSIST
DME_IN_USE: WALKER;SHOWER CHAIR;GRAB BAR
DRIVING: TOTAL ASSIST

## 2021-01-05 ASSESSMENT — PATIENT HEALTH QUESTIONNAIRE - PHQ9
CLINICAL INTERPRETATION OF PHQ9 SCORE: NO FURTHER SCREENING NEEDED
SUM OF ALL RESPONSES TO PHQ9 QUESTIONS 1 AND 2: 0
CLINICAL INTERPRETATION OF PHQ2 SCORE: NO FURTHER SCREENING NEEDED
2. FEELING DOWN, DEPRESSED OR HOPELESS: NOT AT ALL
1. LITTLE INTEREST OR PLEASURE IN DOING THINGS: NOT AT ALL
SUM OF ALL RESPONSES TO PHQ9 QUESTIONS 1 AND 2: 0

## 2021-01-10 ENCOUNTER — APPOINTMENT (OUTPATIENT)
Dept: GENERAL RADIOLOGY | Age: 66
End: 2021-01-10
Attending: EMERGENCY MEDICINE

## 2021-01-10 ENCOUNTER — HOSPITAL ENCOUNTER (EMERGENCY)
Age: 66
Discharge: HOME | End: 2021-01-10
Attending: EMERGENCY MEDICINE

## 2021-01-10 VITALS
DIASTOLIC BLOOD PRESSURE: 62 MMHG | HEART RATE: 62 BPM | OXYGEN SATURATION: 100 % | RESPIRATION RATE: 18 BRPM | SYSTOLIC BLOOD PRESSURE: 117 MMHG

## 2021-01-10 DIAGNOSIS — S73.035A ANTERIOR DISLOCATION OF LEFT HIP, INITIAL ENCOUNTER (CMD): Primary | ICD-10-CM

## 2021-01-10 LAB
ANION GAP BLD CALC-SCNC: 17 MMOL/L (ref 10–20)
ATRIAL RATE (BPM): 70
BASOPHILS # BLD: 0 K/MCL (ref 0–0.3)
BASOPHILS NFR BLD: 1 %
BUN BLD-MCNC: 12 MG/DL (ref 6–20)
CA-I BLD-SCNC: 1.29 MMOL/L (ref 1.15–1.29)
CHLORIDE BLD-SCNC: 109 MMOL/L (ref 98–107)
CO2 BLD-SCNC: 19 MMOL/L (ref 19–24)
CREAT SERPL-MCNC: 1 MG/DL (ref 0.51–0.95)
DEPRECATED RDW RBC: 49.2 FL (ref 39–50)
DIASTOLIC BLOOD PRESSURE: 60
EOSINOPHIL # BLD: 0.1 K/MCL (ref 0–0.5)
EOSINOPHIL NFR BLD: 2 %
ERYTHROCYTE [DISTWIDTH] IN BLOOD: 12.7 % (ref 11–15)
GFR SERPLBLD BASED ON 1.73 SQ M-ARVRAT: 59 ML/MIN/1.73M2
GLUCOSE BLD-MCNC: 82 MG/DL (ref 70–99)
HCT VFR BLD CALC: 38.6 % (ref 36–46.5)
HCT VFR BLD CALC: 39 % (ref 36–46.5)
HGB BLD CALC-MCNC: 13.3 G/DL (ref 12–15.5)
HGB BLD-MCNC: 12.2 G/DL (ref 12–15.5)
IMM GRANULOCYTES # BLD AUTO: 0 K/MCL (ref 0–0.2)
IMM GRANULOCYTES # BLD: 0 %
LYMPHOCYTES # BLD: 1.2 K/MCL (ref 1–4)
LYMPHOCYTES NFR BLD: 27 %
MCH RBC QN AUTO: 33.2 PG (ref 26–34)
MCHC RBC AUTO-ENTMCNC: 31.6 G/DL (ref 32–36.5)
MCV RBC AUTO: 104.9 FL (ref 78–100)
MONOCYTES # BLD: 0.3 K/MCL (ref 0.3–0.9)
MONOCYTES NFR BLD: 7 %
NEUTROPHILS # BLD: 2.9 K/MCL (ref 1.8–7.7)
NEUTROPHILS NFR BLD: 63 %
NRBC BLD MANUAL-RTO: 0 /100 WBC
P AXIS (DEGREES): 71
PLATELET # BLD AUTO: 280 K/MCL (ref 140–450)
POTASSIUM BLD-SCNC: 3.4 MMOL/L (ref 3.4–5.1)
PR-INTERVAL (MSEC): 164
QRS-INTERVAL (MSEC): 92
QT-INTERVAL (MSEC): 396
QTC: 427
R AXIS (DEGREES): 38
RAINBOW EXTRA TUBES HOLD SPECIMEN: NORMAL
RBC # BLD: 3.68 MIL/MCL (ref 4–5.2)
REPORT TEXT: NORMAL
SODIUM BLD-SCNC: 141 MMOL/L (ref 135–145)
SYSTOLIC BLOOD PRESSURE: 130
T AXIS (DEGREES): 49
VENTRICULAR RATE EKG/MIN (BPM): 70
WBC # BLD: 4.6 K/MCL (ref 4.2–11)

## 2021-01-10 PROCEDURE — 96360 HYDRATION IV INFUSION INIT: CPT

## 2021-01-10 PROCEDURE — 85014 HEMATOCRIT: CPT

## 2021-01-10 PROCEDURE — 85025 COMPLETE CBC W/AUTO DIFF WBC: CPT | Performed by: EMERGENCY MEDICINE

## 2021-01-10 PROCEDURE — 10002807 HB RX 258: Performed by: EMERGENCY MEDICINE

## 2021-01-10 PROCEDURE — 93005 ELECTROCARDIOGRAM TRACING: CPT | Performed by: EMERGENCY MEDICINE

## 2021-01-10 PROCEDURE — 99284 EMERGENCY DEPT VISIT MOD MDM: CPT

## 2021-01-10 PROCEDURE — 83605 ASSAY OF LACTIC ACID: CPT

## 2021-01-10 PROCEDURE — 10002800 HB RX 250 W HCPCS: Performed by: EMERGENCY MEDICINE

## 2021-01-10 PROCEDURE — 72170 X-RAY EXAM OF PELVIS: CPT

## 2021-01-10 PROCEDURE — 73501 X-RAY EXAM HIP UNI 1 VIEW: CPT | Performed by: STUDENT IN AN ORGANIZED HEALTH CARE EDUCATION/TRAINING PROGRAM

## 2021-01-10 PROCEDURE — 10003573 HB TREAT DISLOCATED HIP

## 2021-01-10 PROCEDURE — 72170 X-RAY EXAM OF PELVIS: CPT | Performed by: RADIOLOGY

## 2021-01-10 PROCEDURE — 80047 BASIC METABLC PNL IONIZED CA: CPT

## 2021-01-10 PROCEDURE — 84484 ASSAY OF TROPONIN QUANT: CPT

## 2021-01-10 PROCEDURE — 73501 X-RAY EXAM HIP UNI 1 VIEW: CPT

## 2021-01-10 RX ORDER — PROPOFOL 10 MG/ML
INJECTION, EMULSION INTRAVENOUS
Status: COMPLETED
Start: 2021-01-10 | End: 2021-01-10

## 2021-01-10 RX ORDER — PROPOFOL 10 MG/ML
160 INJECTION, EMULSION INTRAVENOUS ONCE
Status: COMPLETED | OUTPATIENT
Start: 2021-01-10 | End: 2021-01-10

## 2021-01-10 RX ORDER — OXYCODONE HYDROCHLORIDE 5 MG/1
5 TABLET ORAL EVERY 4 HOURS PRN
Qty: 8 TABLET | Refills: 0 | Status: SHIPPED | OUTPATIENT
Start: 2021-01-10 | End: 2021-01-13 | Stop reason: ALTCHOICE

## 2021-01-10 RX ORDER — OXYCODONE HYDROCHLORIDE 5 MG/1
5 CAPSULE ORAL EVERY 4 HOURS PRN
Qty: 8 CAPSULE | Refills: 0 | Status: SHIPPED | OUTPATIENT
Start: 2021-01-10 | End: 2021-01-10 | Stop reason: ALTCHOICE

## 2021-01-10 RX ADMIN — SODIUM CHLORIDE 500 ML: 9 INJECTION, SOLUTION INTRAVENOUS at 10:59

## 2021-01-10 RX ADMIN — PROPOFOL 40 MG: 10 INJECTION, EMULSION INTRAVENOUS at 11:53

## 2021-01-10 RX ADMIN — PROPOFOL 20 MG: 10 INJECTION, EMULSION INTRAVENOUS at 11:46

## 2021-01-10 RX ADMIN — PROPOFOL 20 MG: 10 INJECTION, EMULSION INTRAVENOUS at 11:50

## 2021-01-10 RX ADMIN — PROPOFOL 20 MG: 10 INJECTION, EMULSION INTRAVENOUS at 11:44

## 2021-01-10 RX ADMIN — PROPOFOL 20 MG: 10 INJECTION, EMULSION INTRAVENOUS at 11:48

## 2021-01-10 RX ADMIN — PROPOFOL 40 MG: 10 INJECTION, EMULSION INTRAVENOUS at 11:41

## 2021-01-10 ASSESSMENT — PAIN DESCRIPTION - PAIN TYPE
TYPE: ACUTE PAIN

## 2021-01-10 ASSESSMENT — ENCOUNTER SYMPTOMS
COUGH: 0
ABDOMINAL PAIN: 0
VOMITING: 0
FEVER: 0
DIZZINESS: 0
CHEST TIGHTNESS: 0
SHORTNESS OF BREATH: 0
EYES NEGATIVE: 1
CHILLS: 0
BACK PAIN: 0
SORE THROAT: 0
DIARRHEA: 0
NAUSEA: 0
LIGHT-HEADEDNESS: 0
WEAKNESS: 0

## 2021-01-10 ASSESSMENT — PAIN SCALES - GENERAL
PAINLEVEL_OUTOF10: 0
PAINLEVEL_OUTOF10: 8
PAINLEVEL_OUTOF10: 10
PAINLEVEL_OUTOF10: 10
PAINLEVEL_OUTOF10: 5
PAINLEVEL_OUTOF10: 10

## 2021-01-10 ASSESSMENT — LIFESTYLE VARIABLES: SMOKING_HISTORY: YES

## 2021-01-11 ENCOUNTER — TELEPHONE (OUTPATIENT)
Dept: ORTHOPEDICS | Age: 66
End: 2021-01-11

## 2021-01-11 ENCOUNTER — CASE MANAGEMENT (OUTPATIENT)
Dept: CARE COORDINATION | Age: 66
End: 2021-01-11

## 2021-01-11 DIAGNOSIS — M79.605 BILATERAL LEG PAIN: ICD-10-CM

## 2021-01-11 DIAGNOSIS — M79.604 BILATERAL LEG PAIN: ICD-10-CM

## 2021-01-11 LAB
ANION GAP BLD CALC-SCNC: NORMAL MMOL/L
BUN BLD-MCNC: NORMAL MG/DL
CA-I BLD-SCNC: NORMAL MMOL/L
CHLORIDE BLD-SCNC: NORMAL MMOL/L
CO2 BLD-SCNC: NORMAL MMOL/L
CREAT SERPL-MCNC: NORMAL MG/DL
GFR SERPLBLD BASED ON 1.73 SQ M-ARVRAT: NORMAL ML/MIN
GLUCOSE BLD-MCNC: NORMAL MG/DL
HCT VFR BLD CALC: NORMAL %
HGB BLD CALC-MCNC: NORMAL G/DL
LACTATE BLDV-SCNC: NORMAL MMOL/L
POTASSIUM BLD-SCNC: NORMAL MMOL/L
SODIUM BLD-SCNC: NORMAL MMOL/L
TROPONIN I BLD-MCNC: NORMAL NG/ML

## 2021-01-11 RX ORDER — GABAPENTIN 300 MG/1
CAPSULE ORAL
Qty: 30 CAPSULE | Refills: 2 | Status: SHIPPED | OUTPATIENT
Start: 2021-01-11 | End: 2021-04-26

## 2021-01-11 RX ORDER — BUPROPION HYDROCHLORIDE 150 MG/1
TABLET ORAL
Qty: 90 TABLET | Refills: 0 | Status: SHIPPED | OUTPATIENT
Start: 2021-01-11 | End: 2021-02-08

## 2021-01-12 ENCOUNTER — IMAGING SERVICES (OUTPATIENT)
Dept: GENERAL RADIOLOGY | Age: 66
End: 2021-01-12
Attending: PHYSICIAN ASSISTANT

## 2021-01-12 ENCOUNTER — OFFICE VISIT (OUTPATIENT)
Dept: ORTHOPEDICS | Age: 66
End: 2021-01-12

## 2021-01-12 DIAGNOSIS — M25.531 RIGHT WRIST PAIN: ICD-10-CM

## 2021-01-12 DIAGNOSIS — T84.029D DISLOCATION OF PROSTHETIC JOINT, SUBSEQUENT ENCOUNTER: Primary | ICD-10-CM

## 2021-01-12 PROCEDURE — 99213 OFFICE O/P EST LOW 20 MIN: CPT | Performed by: PHYSICIAN ASSISTANT

## 2021-01-12 PROCEDURE — 73110 X-RAY EXAM OF WRIST: CPT | Performed by: RADIOLOGY

## 2021-01-12 SDOH — HEALTH STABILITY: MENTAL HEALTH: HOW OFTEN DO YOU HAVE A DRINK CONTAINING ALCOHOL?: MONTHLY OR LESS

## 2021-01-12 SDOH — HEALTH STABILITY: MENTAL HEALTH: HOW MANY STANDARD DRINKS CONTAINING ALCOHOL DO YOU HAVE ON A TYPICAL DAY?: 1 OR 2

## 2021-01-12 SDOH — HEALTH STABILITY: MENTAL HEALTH: HOW OFTEN DO YOU HAVE 6 OR MORE DRINKS ON ONE OCCASION?: NEVER

## 2021-01-13 ENCOUNTER — CASE MANAGEMENT (OUTPATIENT)
Dept: CARE COORDINATION | Age: 66
End: 2021-01-13

## 2021-01-13 ENCOUNTER — OFFICE VISIT (OUTPATIENT)
Dept: FAMILY MEDICINE | Age: 66
End: 2021-01-13

## 2021-01-13 VITALS
HEART RATE: 73 BPM | DIASTOLIC BLOOD PRESSURE: 65 MMHG | SYSTOLIC BLOOD PRESSURE: 119 MMHG | HEIGHT: 65 IN | BODY MASS INDEX: 27.15 KG/M2

## 2021-01-13 DIAGNOSIS — F31.60 BIPOLAR AFFECTIVE DISORDER, CURRENT EPISODE MIXED, CURRENT EPISODE SEVERITY UNSPECIFIED (CMD): ICD-10-CM

## 2021-01-13 DIAGNOSIS — M25.531 RIGHT WRIST PAIN: ICD-10-CM

## 2021-01-13 DIAGNOSIS — Z96.642 STATUS POST LEFT HIP REPLACEMENT: Primary | ICD-10-CM

## 2021-01-13 PROCEDURE — 99214 OFFICE O/P EST MOD 30 MIN: CPT | Performed by: FAMILY MEDICINE

## 2021-01-14 RX ORDER — LEVOTHYROXINE SODIUM 0.05 MG/1
TABLET ORAL
Qty: 90 TABLET | Refills: 0 | Status: SHIPPED | OUTPATIENT
Start: 2021-01-14 | End: 2021-04-12

## 2021-01-15 ENCOUNTER — CASE MANAGEMENT (OUTPATIENT)
Dept: CARE COORDINATION | Age: 66
End: 2021-01-15

## 2021-01-18 ENCOUNTER — TELEPHONE (OUTPATIENT)
Dept: FAMILY MEDICINE | Age: 66
End: 2021-01-18

## 2021-01-18 ENCOUNTER — TELEPHONE (OUTPATIENT)
Dept: ORTHOPEDICS | Age: 66
End: 2021-01-18

## 2021-01-19 ENCOUNTER — CASE MANAGEMENT (OUTPATIENT)
Dept: CARE COORDINATION | Age: 66
End: 2021-01-19

## 2021-01-20 ENCOUNTER — APPOINTMENT (OUTPATIENT)
Dept: CT IMAGING | Age: 66
End: 2021-01-20
Attending: PHYSICIAN ASSISTANT

## 2021-01-29 ENCOUNTER — IMAGING SERVICES (OUTPATIENT)
Dept: CT IMAGING | Age: 66
End: 2021-01-29
Attending: PHYSICIAN ASSISTANT

## 2021-01-29 DIAGNOSIS — T84.029D DISLOCATION OF PROSTHETIC JOINT, SUBSEQUENT ENCOUNTER: ICD-10-CM

## 2021-01-29 PROCEDURE — 73700 CT LOWER EXTREMITY W/O DYE: CPT | Performed by: RADIOLOGY

## 2021-02-01 ENCOUNTER — CASE MANAGEMENT (OUTPATIENT)
Dept: CARE COORDINATION | Age: 66
End: 2021-02-01

## 2021-02-03 ENCOUNTER — PREP FOR CASE (OUTPATIENT)
Dept: ORTHOPEDICS | Age: 66
End: 2021-02-03

## 2021-02-03 DIAGNOSIS — T84.029D DISLOCATION OF PROSTHETIC JOINT, SUBSEQUENT ENCOUNTER: Primary | ICD-10-CM

## 2021-02-04 DIAGNOSIS — Z01.812 PRE-PROCEDURAL LABORATORY EXAMINATION: Primary | ICD-10-CM

## 2021-02-08 ENCOUNTER — HOSPITAL ENCOUNTER (OUTPATIENT)
Dept: GENERAL RADIOLOGY | Age: 66
Discharge: HOME | End: 2021-02-08
Attending: PAIN MEDICINE

## 2021-02-08 ENCOUNTER — HOSPITAL ENCOUNTER (OUTPATIENT)
Dept: PAIN MANAGEMENT | Age: 66
Discharge: HOME | End: 2021-02-08
Attending: PAIN MEDICINE

## 2021-02-08 ENCOUNTER — TELEPHONE (OUTPATIENT)
Dept: ORTHOPEDICS | Age: 66
End: 2021-02-08

## 2021-02-08 VITALS
OXYGEN SATURATION: 99 % | TEMPERATURE: 98.1 F | DIASTOLIC BLOOD PRESSURE: 66 MMHG | HEART RATE: 78 BPM | RESPIRATION RATE: 18 BRPM | SYSTOLIC BLOOD PRESSURE: 138 MMHG

## 2021-02-08 DIAGNOSIS — M54.12 CERVICAL RADICULOPATHY: ICD-10-CM

## 2021-02-08 DIAGNOSIS — M50.320 DEGENERATION OF INTERVERTEBRAL DISC OF MID-CERVICAL REGION, UNSPECIFIED SPINAL LEVEL: Primary | ICD-10-CM

## 2021-02-08 PROCEDURE — 10004187 HB COUNTER-VISIT CENSUS, OUTPATIENT

## 2021-02-08 PROCEDURE — 10005281 FL PACS RECORD NR

## 2021-02-08 PROCEDURE — 10002800 HB RX 250 W HCPCS: Performed by: PAIN MEDICINE

## 2021-02-08 PROCEDURE — 10000258 HB EPIDURAL INJ 1ST LEVEL W/ IMAGING

## 2021-02-08 PROCEDURE — 10002805 HB CONTRAST AGENT: Performed by: PAIN MEDICINE

## 2021-02-08 RX ORDER — BUPROPION HYDROCHLORIDE 150 MG/1
TABLET ORAL
Qty: 90 TABLET | Refills: 0 | Status: SHIPPED | OUTPATIENT
Start: 2021-02-08 | End: 2021-03-08

## 2021-02-08 RX ORDER — DEXAMETHASONE SODIUM PHOSPHATE 10 MG/ML
20 INJECTION, SOLUTION INTRAMUSCULAR; INTRAVENOUS ONCE
Status: COMPLETED | OUTPATIENT
Start: 2021-02-08 | End: 2021-02-08

## 2021-02-08 RX ORDER — IOPAMIDOL 408 MG/ML
3 INJECTION, SOLUTION INTRATHECAL ONCE
Status: COMPLETED | OUTPATIENT
Start: 2021-02-08 | End: 2021-02-08

## 2021-02-08 RX ADMIN — DEXAMETHASONE SODIUM PHOSPHATE 20 MG: 10 INJECTION, SOLUTION INTRAMUSCULAR; INTRAVENOUS at 14:10

## 2021-02-08 RX ADMIN — IOPAMIDOL 3 ML: 408 INJECTION, SOLUTION INTRATHECAL at 14:10

## 2021-02-08 ASSESSMENT — PAIN SCALES - GENERAL: PAINLEVEL_OUTOF10: 9

## 2021-02-09 ENCOUNTER — APPOINTMENT (OUTPATIENT)
Dept: FAMILY MEDICINE | Age: 66
End: 2021-02-09

## 2021-02-16 ENCOUNTER — LAB SERVICES (OUTPATIENT)
Dept: LAB | Age: 66
End: 2021-02-16

## 2021-02-16 ENCOUNTER — TELEPHONIC VISIT (OUTPATIENT)
Dept: ORTHOPEDICS | Age: 66
End: 2021-02-16

## 2021-02-16 ENCOUNTER — OFFICE VISIT (OUTPATIENT)
Dept: FAMILY MEDICINE | Age: 66
End: 2021-02-16

## 2021-02-16 ENCOUNTER — TELEPHONE (OUTPATIENT)
Dept: ORTHOPEDICS | Age: 66
End: 2021-02-16

## 2021-02-16 VITALS
BODY MASS INDEX: 22.99 KG/M2 | HEART RATE: 71 BPM | DIASTOLIC BLOOD PRESSURE: 59 MMHG | TEMPERATURE: 97.3 F | SYSTOLIC BLOOD PRESSURE: 95 MMHG | HEIGHT: 65 IN | RESPIRATION RATE: 18 BRPM | WEIGHT: 138 LBS

## 2021-02-16 DIAGNOSIS — Z01.818 PRE-OP EVALUATION: ICD-10-CM

## 2021-02-16 DIAGNOSIS — Z01.818 PRE-OP EVALUATION: Primary | ICD-10-CM

## 2021-02-16 DIAGNOSIS — T84.029D DISLOCATION OF PROSTHETIC JOINT, SUBSEQUENT ENCOUNTER: Primary | ICD-10-CM

## 2021-02-16 DIAGNOSIS — Z96.642 STATUS POST LEFT HIP REPLACEMENT: ICD-10-CM

## 2021-02-16 LAB
ANION GAP SERPL CALC-SCNC: 10 MMOL/L (ref 10–20)
BASOPHILS # BLD: 0.1 K/MCL (ref 0–0.3)
BASOPHILS NFR BLD: 1 %
BUN SERPL-MCNC: 15 MG/DL (ref 6–20)
BUN/CREAT SERPL: 13 (ref 7–25)
CALCIUM SERPL-MCNC: 8.6 MG/DL (ref 8.4–10.2)
CHLORIDE SERPL-SCNC: 107 MMOL/L (ref 98–107)
CO2 SERPL-SCNC: 26 MMOL/L (ref 21–32)
CREAT SERPL-MCNC: 1.12 MG/DL (ref 0.51–0.95)
DEPRECATED RDW RBC: 51.5 FL (ref 39–50)
EOSINOPHIL # BLD: 0.1 K/MCL (ref 0–0.5)
EOSINOPHIL NFR BLD: 2 %
ERYTHROCYTE [DISTWIDTH] IN BLOOD: 13.1 % (ref 11–15)
FASTING DURATION TIME PATIENT: ABNORMAL H
GFR SERPLBLD BASED ON 1.73 SQ M-ARVRAT: 51 ML/MIN/1.73M2
GLUCOSE SERPL-MCNC: 76 MG/DL (ref 65–99)
HCT VFR BLD CALC: 37.2 % (ref 36–46.5)
HGB BLD-MCNC: 11.7 G/DL (ref 12–15.5)
IMM GRANULOCYTES # BLD AUTO: 0 K/MCL (ref 0–0.2)
IMM GRANULOCYTES # BLD: 0 %
LYMPHOCYTES # BLD: 2.4 K/MCL (ref 1–4)
LYMPHOCYTES NFR BLD: 50 %
MCH RBC QN AUTO: 33.7 PG (ref 26–34)
MCHC RBC AUTO-ENTMCNC: 31.5 G/DL (ref 32–36.5)
MCV RBC AUTO: 107.2 FL (ref 78–100)
MONOCYTES # BLD: 0.4 K/MCL (ref 0.3–0.9)
MONOCYTES NFR BLD: 9 %
MRSA DNA SPEC QL NAA+PROBE: NOT DETECTED
NEUTROPHILS # BLD: 1.8 K/MCL (ref 1.8–7.7)
NEUTROPHILS NFR BLD: 38 %
NRBC BLD MANUAL-RTO: 0 /100 WBC
PLATELET # BLD AUTO: 313 K/MCL (ref 140–450)
POTASSIUM SERPL-SCNC: 3.7 MMOL/L (ref 3.4–5.1)
RBC # BLD: 3.47 MIL/MCL (ref 4–5.2)
S AUREUS DNA SPEC QL NAA+PROBE: NOT DETECTED
SODIUM SERPL-SCNC: 139 MMOL/L (ref 135–145)
WBC # BLD: 4.7 K/MCL (ref 4.2–11)

## 2021-02-16 PROCEDURE — 80048 BASIC METABOLIC PNL TOTAL CA: CPT | Performed by: CLINICAL MEDICAL LABORATORY

## 2021-02-16 PROCEDURE — 36415 COLL VENOUS BLD VENIPUNCTURE: CPT | Performed by: INTERNAL MEDICINE

## 2021-02-16 PROCEDURE — 87640 STAPH A DNA AMP PROBE: CPT | Performed by: CLINICAL MEDICAL LABORATORY

## 2021-02-16 PROCEDURE — 99214 OFFICE O/P EST MOD 30 MIN: CPT | Performed by: FAMILY MEDICINE

## 2021-02-16 PROCEDURE — 87641 MR-STAPH DNA AMP PROBE: CPT | Performed by: CLINICAL MEDICAL LABORATORY

## 2021-02-16 PROCEDURE — 99442 TELEPHONE E&M BY PHYSICIAN EST PT NOT ORIG PREV 7 DAYS 11-20 MIN: CPT | Performed by: PHYSICIAN ASSISTANT

## 2021-02-16 PROCEDURE — 85025 COMPLETE CBC W/AUTO DIFF WBC: CPT | Performed by: CLINICAL MEDICAL LABORATORY

## 2021-02-17 ENCOUNTER — HOSPITAL ENCOUNTER (EMERGENCY)
Age: 66
Discharge: HOME | End: 2021-02-17
Attending: EMERGENCY MEDICINE

## 2021-02-17 ENCOUNTER — TELEPHONE (OUTPATIENT)
Dept: FAMILY MEDICINE | Age: 66
End: 2021-02-17

## 2021-02-17 ENCOUNTER — APPOINTMENT (OUTPATIENT)
Dept: GENERAL RADIOLOGY | Age: 66
End: 2021-02-17
Attending: EMERGENCY MEDICINE

## 2021-02-17 VITALS
HEART RATE: 65 BPM | SYSTOLIC BLOOD PRESSURE: 137 MMHG | RESPIRATION RATE: 20 BRPM | OXYGEN SATURATION: 98 % | DIASTOLIC BLOOD PRESSURE: 65 MMHG

## 2021-02-17 DIAGNOSIS — S73.005A CLOSED DISLOCATION OF LEFT HIP, INITIAL ENCOUNTER (CMD): Primary | ICD-10-CM

## 2021-02-17 PROCEDURE — 99284 EMERGENCY DEPT VISIT MOD MDM: CPT

## 2021-02-17 PROCEDURE — 93005 ELECTROCARDIOGRAM TRACING: CPT | Performed by: EMERGENCY MEDICINE

## 2021-02-17 PROCEDURE — 96375 TX/PRO/DX INJ NEW DRUG ADDON: CPT

## 2021-02-17 PROCEDURE — 73502 X-RAY EXAM HIP UNI 2-3 VIEWS: CPT | Performed by: RADIOLOGY

## 2021-02-17 PROCEDURE — 10002807 HB RX 258: Performed by: EMERGENCY MEDICINE

## 2021-02-17 PROCEDURE — 96374 THER/PROPH/DIAG INJ IV PUSH: CPT

## 2021-02-17 PROCEDURE — 73502 X-RAY EXAM HIP UNI 2-3 VIEWS: CPT

## 2021-02-17 PROCEDURE — 96361 HYDRATE IV INFUSION ADD-ON: CPT

## 2021-02-17 PROCEDURE — 10002800 HB RX 250 W HCPCS: Performed by: EMERGENCY MEDICINE

## 2021-02-17 RX ORDER — OXYCODONE HYDROCHLORIDE 5 MG/1
5 TABLET ORAL EVERY 6 HOURS PRN
Qty: 4 TABLET | Refills: 0 | Status: ON HOLD | OUTPATIENT
Start: 2021-02-17 | End: 2021-02-24

## 2021-02-17 RX ORDER — 0.9 % SODIUM CHLORIDE 0.9 %
2 VIAL (ML) INJECTION EVERY 12 HOURS SCHEDULED
Status: DISCONTINUED | OUTPATIENT
Start: 2021-02-17 | End: 2021-02-17 | Stop reason: HOSPADM

## 2021-02-17 RX ORDER — PROPOFOL 10 MG/ML
INJECTION, EMULSION INTRAVENOUS
Status: COMPLETED
Start: 2021-02-17 | End: 2021-02-17

## 2021-02-17 RX ORDER — PROPOFOL 10 MG/ML
INJECTION, EMULSION INTRAVENOUS PRN
Status: COMPLETED | OUTPATIENT
Start: 2021-02-17 | End: 2021-02-17

## 2021-02-17 RX ORDER — PROPOFOL 10 MG/ML
80 INJECTION, EMULSION INTRAVENOUS ONCE
Status: COMPLETED | OUTPATIENT
Start: 2021-02-17 | End: 2021-02-17

## 2021-02-17 RX ADMIN — SODIUM CHLORIDE, POTASSIUM CHLORIDE, SODIUM LACTATE AND CALCIUM CHLORIDE 1000 ML: 600; 310; 30; 20 INJECTION, SOLUTION INTRAVENOUS at 14:28

## 2021-02-17 RX ADMIN — PROPOFOL 20 MG: 10 INJECTION, EMULSION INTRAVENOUS at 14:44

## 2021-02-17 RX ADMIN — PROPOFOL 20 MG: 10 INJECTION, EMULSION INTRAVENOUS at 14:46

## 2021-02-17 RX ADMIN — HYDROMORPHONE HYDROCHLORIDE 0.5 MG: 1 INJECTION, SOLUTION INTRAMUSCULAR; INTRAVENOUS; SUBCUTANEOUS at 14:20

## 2021-02-17 RX ADMIN — PROPOFOL 40 MG: 10 INJECTION, EMULSION INTRAVENOUS at 14:42

## 2021-02-17 SDOH — HEALTH STABILITY: MENTAL HEALTH: HOW MANY STANDARD DRINKS CONTAINING ALCOHOL DO YOU HAVE ON A TYPICAL DAY?: 1 OR 2

## 2021-02-17 SDOH — HEALTH STABILITY: MENTAL HEALTH: HOW OFTEN DO YOU HAVE A DRINK CONTAINING ALCOHOL?: MONTHLY OR LESS

## 2021-02-17 SDOH — HEALTH STABILITY: MENTAL HEALTH: HOW OFTEN DO YOU HAVE 6 OR MORE DRINKS ON ONE OCCASION?: NEVER

## 2021-02-17 ASSESSMENT — PAIN DESCRIPTION - PAIN TYPE
TYPE: ACUTE PAIN

## 2021-02-17 ASSESSMENT — PAIN SCALES - GENERAL
PAINLEVEL_OUTOF10: 10

## 2021-02-17 ASSESSMENT — ENCOUNTER SYMPTOMS
VOMITING: 0
SHORTNESS OF BREATH: 0
BRUISES/BLEEDS EASILY: 0
PSYCHIATRIC NEGATIVE: 1
COUGH: 0
NAUSEA: 0
HEADACHES: 0
SORE THROAT: 0
DIZZINESS: 0
DIARRHEA: 0
BACK PAIN: 0
AGITATION: 0
FEVER: 0

## 2021-02-17 ASSESSMENT — LIFESTYLE VARIABLES: SMOKING_HISTORY: YES

## 2021-02-18 ENCOUNTER — TELEPHONE (OUTPATIENT)
Dept: ORTHOPEDICS | Age: 66
End: 2021-02-18

## 2021-02-18 ENCOUNTER — CASE MANAGEMENT (OUTPATIENT)
Dept: CARE COORDINATION | Age: 66
End: 2021-02-18

## 2021-02-18 SDOH — HEALTH STABILITY: MENTAL HEALTH: HOW OFTEN DO YOU HAVE A DRINK CONTAINING ALCOHOL?: MONTHLY OR LESS

## 2021-02-18 SDOH — HEALTH STABILITY: MENTAL HEALTH: HOW OFTEN DO YOU HAVE 6 OR MORE DRINKS ON ONE OCCASION?: NEVER

## 2021-02-18 SDOH — HEALTH STABILITY: MENTAL HEALTH: HOW MANY STANDARD DRINKS CONTAINING ALCOHOL DO YOU HAVE ON A TYPICAL DAY?: 1 OR 2

## 2021-02-18 ASSESSMENT — ACTIVITIES OF DAILY LIVING (ADL)
NEEDS_ASSIST: YES
CHRONIC_PAIN_PRESENT: NO
ADL_SHORT_OF_BREATH: NO
MOBILITY_ASSIST_DEVICES: FOUR WHEEL WALKER
FEEDING: DEPENDENT
BATHING: NEEDS ASSISTANCE
HISTORY OF FALLING IN THE LAST YEAR (PRIOR TO ADMISSION): YES
SENSORY_SUPPORT_DEVICES: EYEGLASSES;DENTURES
ADL_SCORE: 13
TOILETING: DEPENDENT
ADL_BEFORE_ADMISSION: INDEPENDENT
TRANSFERRING: DEPENDENT
RECENT_DECLINE_ADL: NO
DRESSING: DEPENDENT
CONTINENCE: DEPENDENT

## 2021-02-20 LAB
ATRIAL RATE (BPM): 69
DIASTOLIC BLOOD PRESSURE: 57
PR-INTERVAL (MSEC): 166
QRS-INTERVAL (MSEC): 80
QT-INTERVAL (MSEC): 384
QTC: 411
R AXIS (DEGREES): 44
REPORT TEXT: NORMAL
SYSTOLIC BLOOD PRESSURE: 104
T AXIS (DEGREES): 63
VENTRICULAR RATE EKG/MIN (BPM): 69

## 2021-02-22 ENCOUNTER — TELEPHONE (OUTPATIENT)
Dept: FAMILY MEDICINE | Age: 66
End: 2021-02-22

## 2021-02-22 ENCOUNTER — LAB SERVICES (OUTPATIENT)
Dept: LAB | Age: 66
End: 2021-02-22
Attending: ORTHOPAEDIC SURGERY

## 2021-02-22 ENCOUNTER — TELEPHONE (OUTPATIENT)
Dept: ORTHOPEDICS | Age: 66
End: 2021-02-22

## 2021-02-22 DIAGNOSIS — Z01.812 PRE-PROCEDURAL LABORATORY EXAMINATION: ICD-10-CM

## 2021-02-22 PROCEDURE — U0005 INFEC AGEN DETEC AMPLI PROBE: HCPCS

## 2021-02-23 ENCOUNTER — ANESTHESIA EVENT (OUTPATIENT)
Dept: SURGERY | Age: 66
End: 2021-02-23

## 2021-02-23 DIAGNOSIS — Z51.89 ENCOUNTER FOR THERAPEUTIC PROCEDURE: Primary | ICD-10-CM

## 2021-02-23 LAB
SARS-COV-2 RNA RESP QL NAA+PROBE: NOT DETECTED
SERVICE CMNT-IMP: NORMAL
SERVICE CMNT-IMP: NORMAL

## 2021-02-24 ENCOUNTER — ANESTHESIA (OUTPATIENT)
Dept: SURGERY | Age: 66
End: 2021-02-24

## 2021-02-24 ENCOUNTER — HOSPITAL ENCOUNTER (OUTPATIENT)
Age: 66
Discharge: HOME | End: 2021-02-25
Attending: ORTHOPAEDIC SURGERY | Admitting: ORTHOPAEDIC SURGERY

## 2021-02-24 ENCOUNTER — APPOINTMENT (OUTPATIENT)
Dept: GENERAL RADIOLOGY | Age: 66
End: 2021-02-24
Attending: ORTHOPAEDIC SURGERY

## 2021-02-24 DIAGNOSIS — T84.029D DISLOCATION OF PROSTHETIC JOINT, SUBSEQUENT ENCOUNTER: ICD-10-CM

## 2021-02-24 DIAGNOSIS — R26.9 ABNORMAL GAIT: Primary | ICD-10-CM

## 2021-02-24 DIAGNOSIS — Z96.642 STATUS POST LEFT HIP REPLACEMENT: ICD-10-CM

## 2021-02-24 LAB — POTASSIUM SERPL-SCNC: 4 MMOL/L (ref 3.4–5.1)

## 2021-02-24 PROCEDURE — 10002800 HB RX 250 W HCPCS: Performed by: ORTHOPAEDIC SURGERY

## 2021-02-24 PROCEDURE — 97162 PT EVAL MOD COMPLEX 30 MIN: CPT

## 2021-02-24 PROCEDURE — 10002800 HB RX 250 W HCPCS: Performed by: ANESTHESIOLOGY

## 2021-02-24 PROCEDURE — 10002807 HB RX 258: Performed by: ANESTHESIOLOGY

## 2021-02-24 PROCEDURE — 10002800 HB RX 250 W HCPCS

## 2021-02-24 PROCEDURE — 10004173 HB COUNTER-THERAPY VISIT PT

## 2021-02-24 PROCEDURE — C1713 ANCHOR/SCREW BN/BN,TIS/BN: HCPCS | Performed by: ORTHOPAEDIC SURGERY

## 2021-02-24 PROCEDURE — 10002019 HB COUNTER RESP ASSESSMENT

## 2021-02-24 PROCEDURE — 10002803 HB RX 637: Performed by: ANESTHESIOLOGY

## 2021-02-24 PROCEDURE — 27134 REVISE HIP JOINT REPLACEMENT: CPT | Performed by: ANESTHESIOLOGY

## 2021-02-24 PROCEDURE — 10004316 HB COUNTER-PREP

## 2021-02-24 PROCEDURE — 94640 AIRWAY INHALATION TREATMENT: CPT

## 2021-02-24 PROCEDURE — 10004452 HB PACU ADDL 30 MINUTES: Performed by: ORTHOPAEDIC SURGERY

## 2021-02-24 PROCEDURE — 10002803 HB RX 637: Performed by: ORTHOPAEDIC SURGERY

## 2021-02-24 PROCEDURE — 72170 X-RAY EXAM OF PELVIS: CPT | Performed by: RADIOLOGY

## 2021-02-24 PROCEDURE — 10002016 HB COUNTER INCENTIVE SPIROMETRY

## 2021-02-24 PROCEDURE — 94150 VITAL CAPACITY TEST: CPT

## 2021-02-24 PROCEDURE — 87070 CULTURE OTHR SPECIMN AEROBIC: CPT | Performed by: ORTHOPAEDIC SURGERY

## 2021-02-24 PROCEDURE — 10004281 HB COUNTER-STAFF TIME PER 15 MIN

## 2021-02-24 PROCEDURE — 72170 X-RAY EXAM OF PELVIS: CPT

## 2021-02-24 PROCEDURE — 10006403 HB ORTHO MAJOR COMPLEX: Performed by: ORTHOPAEDIC SURGERY

## 2021-02-24 PROCEDURE — 10002807 HB RX 258: Performed by: ORTHOPAEDIC SURGERY

## 2021-02-24 PROCEDURE — 10004651 HB RX, NO CHARGE ITEM: Performed by: PHYSICIAN ASSISTANT

## 2021-02-24 PROCEDURE — 10004571 HB COUNTER-HOLDING 30 MIN: Performed by: ANESTHESIOLOGY

## 2021-02-24 PROCEDURE — 10002801 HB RX 250 W/O HCPCS: Performed by: ANESTHESIOLOGY

## 2021-02-24 PROCEDURE — 10005281 FL PACS RECORD NR

## 2021-02-24 PROCEDURE — 97116 GAIT TRAINING THERAPY: CPT

## 2021-02-24 PROCEDURE — 10002117 HB ADDITIONAL SURGERY TIME/30 MIN: Performed by: ORTHOPAEDIC SURGERY

## 2021-02-24 PROCEDURE — 10004451 HB PACU RECOVERY 1ST 30 MINUTES: Performed by: ORTHOPAEDIC SURGERY

## 2021-02-24 PROCEDURE — 10006027 HB SUPPLY 278: Performed by: ORTHOPAEDIC SURGERY

## 2021-02-24 PROCEDURE — 10002358 HB ANESTH GENERAL 1ST 1/2 HR: Performed by: ORTHOPAEDIC SURGERY

## 2021-02-24 PROCEDURE — 10002801 HB RX 250 W/O HCPCS: Performed by: ORTHOPAEDIC SURGERY

## 2021-02-24 PROCEDURE — C1776 JOINT DEVICE (IMPLANTABLE): HCPCS | Performed by: ORTHOPAEDIC SURGERY

## 2021-02-24 PROCEDURE — 87075 CULTR BACTERIA EXCEPT BLOOD: CPT | Performed by: ORTHOPAEDIC SURGERY

## 2021-02-24 PROCEDURE — 10002803 HB RX 637: Performed by: PHYSICIAN ASSISTANT

## 2021-02-24 PROCEDURE — 84132 ASSAY OF SERUM POTASSIUM: CPT | Performed by: ANESTHESIOLOGY

## 2021-02-24 PROCEDURE — 10006023 HB SUPPLY 272: Performed by: ORTHOPAEDIC SURGERY

## 2021-02-24 PROCEDURE — 27134 REVISE HIP JOINT REPLACEMENT: CPT | Performed by: ORTHOPAEDIC SURGERY

## 2021-02-24 PROCEDURE — 10004651 HB RX, NO CHARGE ITEM: Performed by: ORTHOPAEDIC SURGERY

## 2021-02-24 PROCEDURE — 10002359 HB ANESTH GENERAL ADD'L 1/2 HR: Performed by: ORTHOPAEDIC SURGERY

## 2021-02-24 DEVICE — IMPLANTABLE DEVICE: Type: IMPLANTABLE DEVICE | Site: HIP | Status: FUNCTIONAL

## 2021-02-24 DEVICE — BIOLOX DELTA TS CERAMIC FEMORAL HEAD 12/14 TAPER REVISION DIAMETER 28MM +1.5
Type: IMPLANTABLE DEVICE | Site: HIP | Status: FUNCTIONAL
Brand: BIOLOX DELTA

## 2021-02-24 DEVICE — MMIS - LINER BI MENTUM 28/47: Type: IMPLANTABLE DEVICE | Site: HIP | Status: FUNCTIONAL

## 2021-02-24 RX ORDER — SODIUM CHLORIDE, SODIUM LACTATE, POTASSIUM CHLORIDE, CALCIUM CHLORIDE 600; 310; 30; 20 MG/100ML; MG/100ML; MG/100ML; MG/100ML
INJECTION, SOLUTION INTRAVENOUS CONTINUOUS PRN
Status: DISCONTINUED | OUTPATIENT
Start: 2021-02-24 | End: 2021-02-24

## 2021-02-24 RX ORDER — BISACODYL 10 MG
10 SUPPOSITORY, RECTAL RECTAL DAILY PRN
Status: DISCONTINUED | OUTPATIENT
Start: 2021-02-24 | End: 2021-02-25 | Stop reason: HOSPADM

## 2021-02-24 RX ORDER — FAMOTIDINE 20 MG/1
20 TABLET, FILM COATED ORAL
Status: COMPLETED | OUTPATIENT
Start: 2021-02-24 | End: 2021-02-24

## 2021-02-24 RX ORDER — LIDOCAINE HYDROCHLORIDE 10 MG/ML
5-10 INJECTION, SOLUTION INFILTRATION; PERINEURAL PRN
Status: DISCONTINUED | OUTPATIENT
Start: 2021-02-24 | End: 2021-02-24 | Stop reason: HOSPADM

## 2021-02-24 RX ORDER — EPHEDRINE SULFATE/0.9% NACL/PF 50 MG/10ML
SYRINGE (ML) INTRAVENOUS PRN
Status: DISCONTINUED | OUTPATIENT
Start: 2021-02-24 | End: 2021-02-24

## 2021-02-24 RX ORDER — 0.9 % SODIUM CHLORIDE 0.9 %
2 VIAL (ML) INJECTION EVERY 12 HOURS SCHEDULED
Status: DISCONTINUED | OUTPATIENT
Start: 2021-02-24 | End: 2021-02-24 | Stop reason: HOSPADM

## 2021-02-24 RX ORDER — VANCOMYCIN HYDROCHLORIDE 1 G/20ML
INJECTION, POWDER, LYOPHILIZED, FOR SOLUTION INTRAVENOUS PRN
Status: DISCONTINUED | OUTPATIENT
Start: 2021-02-24 | End: 2021-02-24 | Stop reason: HOSPADM

## 2021-02-24 RX ORDER — FUROSEMIDE 80 MG
80 TABLET ORAL DAILY
Status: DISCONTINUED | OUTPATIENT
Start: 2021-02-25 | End: 2021-02-25 | Stop reason: HOSPADM

## 2021-02-24 RX ORDER — LISINOPRIL 20 MG/1
20 TABLET ORAL DAILY
Status: DISCONTINUED | OUTPATIENT
Start: 2021-02-25 | End: 2021-02-25 | Stop reason: HOSPADM

## 2021-02-24 RX ORDER — ROCURONIUM BROMIDE 10 MG/ML
INJECTION, SOLUTION INTRAVENOUS PRN
Status: DISCONTINUED | OUTPATIENT
Start: 2021-02-24 | End: 2021-02-24

## 2021-02-24 RX ORDER — BUDESONIDE AND FORMOTEROL FUMARATE DIHYDRATE 160; 4.5 UG/1; UG/1
2 AEROSOL RESPIRATORY (INHALATION)
Status: DISCONTINUED | OUTPATIENT
Start: 2021-02-24 | End: 2021-02-24

## 2021-02-24 RX ORDER — PROCHLORPERAZINE EDISYLATE 5 MG/ML
5 INJECTION INTRAMUSCULAR; INTRAVENOUS EVERY 4 HOURS PRN
Status: DISCONTINUED | OUTPATIENT
Start: 2021-02-24 | End: 2021-02-24 | Stop reason: HOSPADM

## 2021-02-24 RX ORDER — BUDESONIDE AND FORMOTEROL FUMARATE DIHYDRATE 160; 4.5 UG/1; UG/1
2 AEROSOL RESPIRATORY (INHALATION)
Status: DISCONTINUED | OUTPATIENT
Start: 2021-02-24 | End: 2021-02-25 | Stop reason: HOSPADM

## 2021-02-24 RX ORDER — ENOXAPARIN SODIUM 100 MG/ML
40 INJECTION SUBCUTANEOUS DAILY
Status: DISCONTINUED | OUTPATIENT
Start: 2021-02-25 | End: 2021-02-25 | Stop reason: HOSPADM

## 2021-02-24 RX ORDER — ACETAMINOPHEN 325 MG/1
650 TABLET ORAL EVERY 6 HOURS PRN
Status: DISCONTINUED | OUTPATIENT
Start: 2021-02-24 | End: 2021-02-25 | Stop reason: HOSPADM

## 2021-02-24 RX ORDER — HYDROCODONE BITARTRATE AND ACETAMINOPHEN 5; 325 MG/1; MG/1
1 TABLET ORAL EVERY 4 HOURS PRN
Status: DISCONTINUED | OUTPATIENT
Start: 2021-02-24 | End: 2021-02-25 | Stop reason: HOSPADM

## 2021-02-24 RX ORDER — METOCLOPRAMIDE HYDROCHLORIDE 5 MG/ML
5 INJECTION INTRAMUSCULAR; INTRAVENOUS EVERY 6 HOURS PRN
Status: DISCONTINUED | OUTPATIENT
Start: 2021-02-24 | End: 2021-02-25 | Stop reason: HOSPADM

## 2021-02-24 RX ORDER — ANALGESIC BALM 1.74; 4.06 G/29G; G/29G
1 OINTMENT TOPICAL EVERY 6 HOURS PRN
Status: DISCONTINUED | OUTPATIENT
Start: 2021-02-24 | End: 2021-02-25 | Stop reason: HOSPADM

## 2021-02-24 RX ORDER — GABAPENTIN 100 MG/1
100 CAPSULE ORAL 3 TIMES DAILY
Status: DISCONTINUED | OUTPATIENT
Start: 2021-02-24 | End: 2021-02-24 | Stop reason: DRUGHIGH

## 2021-02-24 RX ORDER — SODIUM CHLORIDE, SODIUM LACTATE, POTASSIUM CHLORIDE, CALCIUM CHLORIDE 600; 310; 30; 20 MG/100ML; MG/100ML; MG/100ML; MG/100ML
INJECTION, SOLUTION INTRAVENOUS CONTINUOUS
Status: DISCONTINUED | OUTPATIENT
Start: 2021-02-24 | End: 2021-02-24

## 2021-02-24 RX ORDER — CALCIUM CARBONATE 500 MG/1
500-1000 TABLET, CHEWABLE ORAL EVERY 4 HOURS PRN
Status: DISCONTINUED | OUTPATIENT
Start: 2021-02-24 | End: 2021-02-25 | Stop reason: HOSPADM

## 2021-02-24 RX ORDER — LANOLIN ALCOHOL/MO/W.PET/CERES
100 CREAM (GRAM) TOPICAL DAILY
Status: DISCONTINUED | OUTPATIENT
Start: 2021-02-24 | End: 2021-02-25 | Stop reason: HOSPADM

## 2021-02-24 RX ORDER — METOCLOPRAMIDE 5 MG/1
5 TABLET ORAL EVERY 6 HOURS PRN
Status: DISCONTINUED | OUTPATIENT
Start: 2021-02-24 | End: 2021-02-25 | Stop reason: HOSPADM

## 2021-02-24 RX ORDER — BUPIVACAINE HYDROCHLORIDE AND EPINEPHRINE 2.5; 5 MG/ML; UG/ML
INJECTION, SOLUTION EPIDURAL; INFILTRATION; INTRACAUDAL; PERINEURAL PRN
Status: DISCONTINUED | OUTPATIENT
Start: 2021-02-24 | End: 2021-02-24 | Stop reason: HOSPADM

## 2021-02-24 RX ORDER — FERROUS SULFATE 325(65) MG
325 TABLET ORAL 3 TIMES DAILY
Status: DISCONTINUED | OUTPATIENT
Start: 2021-02-24 | End: 2021-02-25 | Stop reason: HOSPADM

## 2021-02-24 RX ORDER — TRAZODONE HYDROCHLORIDE 100 MG/1
200 TABLET ORAL NIGHTLY
Status: DISCONTINUED | OUTPATIENT
Start: 2021-02-24 | End: 2021-02-25 | Stop reason: HOSPADM

## 2021-02-24 RX ORDER — SERTRALINE HYDROCHLORIDE 100 MG/1
100 TABLET, FILM COATED ORAL DAILY
Status: DISCONTINUED | OUTPATIENT
Start: 2021-02-24 | End: 2021-02-25 | Stop reason: HOSPADM

## 2021-02-24 RX ORDER — DEXAMETHASONE SODIUM PHOSPHATE 4 MG/ML
INJECTION, SOLUTION INTRA-ARTICULAR; INTRALESIONAL; INTRAMUSCULAR; INTRAVENOUS; SOFT TISSUE PRN
Status: DISCONTINUED | OUTPATIENT
Start: 2021-02-24 | End: 2021-02-24

## 2021-02-24 RX ORDER — FOLIC ACID 1 MG/1
1 TABLET ORAL DAILY
Status: DISCONTINUED | OUTPATIENT
Start: 2021-02-24 | End: 2021-02-25 | Stop reason: HOSPADM

## 2021-02-24 RX ORDER — DEXAMETHASONE SODIUM PHOSPHATE 10 MG/ML
10 INJECTION, SOLUTION INTRAMUSCULAR; INTRAVENOUS ONCE
Status: COMPLETED | OUTPATIENT
Start: 2021-02-25 | End: 2021-02-25

## 2021-02-24 RX ORDER — ACETAMINOPHEN 650 MG/1
650 SUPPOSITORY RECTAL EVERY 4 HOURS PRN
Status: DISCONTINUED | OUTPATIENT
Start: 2021-02-24 | End: 2021-02-25 | Stop reason: HOSPADM

## 2021-02-24 RX ORDER — NICOTINE POLACRILEX 4 MG
30 LOZENGE BUCCAL
Status: DISCONTINUED | OUTPATIENT
Start: 2021-02-24 | End: 2021-02-24 | Stop reason: HOSPADM

## 2021-02-24 RX ORDER — LEVOTHYROXINE SODIUM 0.05 MG/1
50 TABLET ORAL
Status: DISCONTINUED | OUTPATIENT
Start: 2021-02-24 | End: 2021-02-25 | Stop reason: HOSPADM

## 2021-02-24 RX ORDER — TOPIRAMATE 100 MG/1
200 TABLET, FILM COATED ORAL EVERY MORNING
Status: DISCONTINUED | OUTPATIENT
Start: 2021-02-25 | End: 2021-02-25 | Stop reason: HOSPADM

## 2021-02-24 RX ORDER — SODIUM CHLORIDE, SODIUM LACTATE, POTASSIUM CHLORIDE, CALCIUM CHLORIDE 600; 310; 30; 20 MG/100ML; MG/100ML; MG/100ML; MG/100ML
INJECTION, SOLUTION INTRAVENOUS CONTINUOUS
Status: DISPENSED | OUTPATIENT
Start: 2021-02-24 | End: 2021-02-24

## 2021-02-24 RX ORDER — DEXTROSE MONOHYDRATE 25 G/50ML
25 INJECTION, SOLUTION INTRAVENOUS PRN
Status: DISCONTINUED | OUTPATIENT
Start: 2021-02-24 | End: 2021-02-24 | Stop reason: HOSPADM

## 2021-02-24 RX ORDER — AMOXICILLIN 250 MG
2 CAPSULE ORAL 2 TIMES DAILY PRN
Status: DISCONTINUED | OUTPATIENT
Start: 2021-02-24 | End: 2021-02-25 | Stop reason: HOSPADM

## 2021-02-24 RX ORDER — SODIUM CHLORIDE 9 MG/ML
INJECTION, SOLUTION INTRAVENOUS CONTINUOUS
Status: DISCONTINUED | OUTPATIENT
Start: 2021-02-24 | End: 2021-02-24

## 2021-02-24 RX ORDER — MIDAZOLAM HYDROCHLORIDE 1 MG/ML
2 INJECTION, SOLUTION INTRAMUSCULAR; INTRAVENOUS
Status: COMPLETED | OUTPATIENT
Start: 2021-02-24 | End: 2021-02-24

## 2021-02-24 RX ORDER — CETIRIZINE HYDROCHLORIDE 10 MG/1
10 TABLET ORAL DAILY
Status: DISCONTINUED | OUTPATIENT
Start: 2021-02-24 | End: 2021-02-25 | Stop reason: HOSPADM

## 2021-02-24 RX ORDER — HYDRALAZINE HYDROCHLORIDE 20 MG/ML
5 INJECTION INTRAMUSCULAR; INTRAVENOUS EVERY 10 MIN PRN
Status: DISCONTINUED | OUTPATIENT
Start: 2021-02-24 | End: 2021-02-24 | Stop reason: HOSPADM

## 2021-02-24 RX ORDER — PROPOFOL 10 MG/ML
INJECTION, EMULSION INTRAVENOUS PRN
Status: DISCONTINUED | OUTPATIENT
Start: 2021-02-24 | End: 2021-02-24

## 2021-02-24 RX ORDER — LIDOCAINE HYDROCHLORIDE 20 MG/ML
INJECTION, SOLUTION INFILTRATION; PERINEURAL PRN
Status: DISCONTINUED | OUTPATIENT
Start: 2021-02-24 | End: 2021-02-24

## 2021-02-24 RX ORDER — POLYETHYLENE GLYCOL 3350 17 G/17G
17 POWDER, FOR SOLUTION ORAL DAILY PRN
Status: DISCONTINUED | OUTPATIENT
Start: 2021-02-24 | End: 2021-02-25 | Stop reason: HOSPADM

## 2021-02-24 RX ORDER — OXYCODONE HYDROCHLORIDE 5 MG/1
5-10 TABLET ORAL EVERY 4 HOURS PRN
Status: DISCONTINUED | OUTPATIENT
Start: 2021-02-24 | End: 2021-02-25 | Stop reason: HOSPADM

## 2021-02-24 RX ORDER — CHLORHEXIDINE GLUCONATE ORAL RINSE 1.2 MG/ML
15 SOLUTION DENTAL
Status: COMPLETED | OUTPATIENT
Start: 2021-02-24 | End: 2021-02-24

## 2021-02-24 RX ORDER — ALBUTEROL SULFATE 90 UG/1
2 AEROSOL, METERED RESPIRATORY (INHALATION) EVERY 4 HOURS PRN
Status: DISCONTINUED | OUTPATIENT
Start: 2021-02-24 | End: 2021-02-25 | Stop reason: HOSPADM

## 2021-02-24 RX ORDER — TOPIRAMATE 100 MG/1
400 TABLET, FILM COATED ORAL NIGHTLY
Status: DISCONTINUED | OUTPATIENT
Start: 2021-02-24 | End: 2021-02-25 | Stop reason: HOSPADM

## 2021-02-24 RX ORDER — HUMAN INSULIN 100 [IU]/ML
INJECTION, SOLUTION SUBCUTANEOUS
Status: DISCONTINUED | OUTPATIENT
Start: 2021-02-24 | End: 2021-02-24 | Stop reason: HOSPADM

## 2021-02-24 RX ORDER — PREGABALIN 75 MG/1
75 CAPSULE ORAL
Status: COMPLETED | OUTPATIENT
Start: 2021-02-24 | End: 2021-02-24

## 2021-02-24 RX ORDER — MULTIVITAMIN,THER AND MINERALS
1 TABLET ORAL DAILY
Status: DISCONTINUED | OUTPATIENT
Start: 2021-02-24 | End: 2021-02-25 | Stop reason: HOSPADM

## 2021-02-24 RX ORDER — DULOXETIN HYDROCHLORIDE 60 MG/1
60 CAPSULE, DELAYED RELEASE ORAL DAILY
Status: DISCONTINUED | OUTPATIENT
Start: 2021-02-24 | End: 2021-02-25 | Stop reason: HOSPADM

## 2021-02-24 RX ORDER — GABAPENTIN 300 MG/1
300 CAPSULE ORAL DAILY
Status: DISCONTINUED | OUTPATIENT
Start: 2021-02-24 | End: 2021-02-25 | Stop reason: HOSPADM

## 2021-02-24 RX ORDER — ONDANSETRON 4 MG/1
4 TABLET, ORALLY DISINTEGRATING ORAL EVERY 12 HOURS PRN
Status: DISCONTINUED | OUTPATIENT
Start: 2021-02-24 | End: 2021-02-25 | Stop reason: HOSPADM

## 2021-02-24 RX ORDER — CELECOXIB 200 MG/1
200 CAPSULE ORAL
Status: COMPLETED | OUTPATIENT
Start: 2021-02-24 | End: 2021-02-24

## 2021-02-24 RX ORDER — PANTOPRAZOLE SODIUM 40 MG/1
40 TABLET, DELAYED RELEASE ORAL
Status: DISCONTINUED | OUTPATIENT
Start: 2021-02-24 | End: 2021-02-25 | Stop reason: HOSPADM

## 2021-02-24 RX ORDER — ONDANSETRON 4 MG/1
4 TABLET, ORALLY DISINTEGRATING ORAL
Status: COMPLETED | OUTPATIENT
Start: 2021-02-24 | End: 2021-02-24

## 2021-02-24 RX ORDER — CYCLOBENZAPRINE HCL 5 MG
5 TABLET ORAL 3 TIMES DAILY PRN
Status: DISCONTINUED | OUTPATIENT
Start: 2021-02-24 | End: 2021-02-25 | Stop reason: HOSPADM

## 2021-02-24 RX ORDER — MIDAZOLAM HYDROCHLORIDE 1 MG/ML
2 INJECTION, SOLUTION INTRAMUSCULAR; INTRAVENOUS ONCE
Status: DISCONTINUED | OUTPATIENT
Start: 2021-02-24 | End: 2021-02-24 | Stop reason: HOSPADM

## 2021-02-24 RX ORDER — ACETAMINOPHEN 500 MG
1000 TABLET ORAL
Status: COMPLETED | OUTPATIENT
Start: 2021-02-24 | End: 2021-02-24

## 2021-02-24 RX ORDER — MODAFINIL 100 MG/1
200 TABLET ORAL 2 TIMES DAILY
Status: DISCONTINUED | OUTPATIENT
Start: 2021-02-24 | End: 2021-02-25 | Stop reason: HOSPADM

## 2021-02-24 RX ORDER — ONDANSETRON 2 MG/ML
INJECTION INTRAMUSCULAR; INTRAVENOUS PRN
Status: DISCONTINUED | OUTPATIENT
Start: 2021-02-24 | End: 2021-02-24

## 2021-02-24 RX ORDER — ASPIRIN 81 MG/1
81 TABLET, CHEWABLE ORAL DAILY
Status: DISCONTINUED | OUTPATIENT
Start: 2021-02-24 | End: 2021-02-25 | Stop reason: HOSPADM

## 2021-02-24 RX ORDER — ONDANSETRON 2 MG/ML
4 INJECTION INTRAMUSCULAR; INTRAVENOUS EVERY 12 HOURS PRN
Status: DISCONTINUED | OUTPATIENT
Start: 2021-02-24 | End: 2021-02-25 | Stop reason: HOSPADM

## 2021-02-24 RX ADMIN — PANTOPRAZOLE SODIUM 40 MG: 40 TABLET, DELAYED RELEASE ORAL at 15:12

## 2021-02-24 RX ADMIN — FAMOTIDINE 20 MG: 20 TABLET ORAL at 06:30

## 2021-02-24 RX ADMIN — ROCURONIUM BROMIDE 20 MG: 50 INJECTION, SOLUTION INTRAVENOUS at 07:35

## 2021-02-24 RX ADMIN — HYDROMORPHONE HYDROCHLORIDE 0.5 MG: 1 INJECTION, SOLUTION INTRAMUSCULAR; INTRAVENOUS; SUBCUTANEOUS at 07:37

## 2021-02-24 RX ADMIN — Medication 325 MG: at 15:02

## 2021-02-24 RX ADMIN — ONDANSETRON 4 MG: 2 INJECTION INTRAMUSCULAR; INTRAVENOUS at 07:16

## 2021-02-24 RX ADMIN — Medication 100 MG: at 15:13

## 2021-02-24 RX ADMIN — OXYCODONE HYDROCHLORIDE 5 MG: 5 TABLET ORAL at 21:22

## 2021-02-24 RX ADMIN — KETOROLAC TROMETHAMINE 15 MG: 15 INJECTION, SOLUTION INTRAMUSCULAR; INTRAVENOUS at 10:53

## 2021-02-24 RX ADMIN — CETIRIZINE HYDROCHLORIDE 10 MG: 10 TABLET ORAL at 15:02

## 2021-02-24 RX ADMIN — ROCURONIUM BROMIDE 50 MG: 50 INJECTION, SOLUTION INTRAVENOUS at 07:05

## 2021-02-24 RX ADMIN — DEXAMETHASONE SODIUM PHOSPHATE 4 MG: 4 INJECTION, SOLUTION INTRAMUSCULAR; INTRAVENOUS at 07:16

## 2021-02-24 RX ADMIN — CEFAZOLIN SODIUM 2000 MG: 300 INJECTION, POWDER, LYOPHILIZED, FOR SOLUTION INTRAVENOUS at 07:16

## 2021-02-24 RX ADMIN — SUGAMMADEX 129.2 MG: 100 INJECTION, SOLUTION INTRAVENOUS at 09:48

## 2021-02-24 RX ADMIN — Medication 1000 UNITS: at 13:49

## 2021-02-24 RX ADMIN — MIDAZOLAM HYDROCHLORIDE 2 MG: 1 INJECTION, SOLUTION INTRAMUSCULAR; INTRAVENOUS at 06:55

## 2021-02-24 RX ADMIN — ROCURONIUM BROMIDE 20 MG: 50 INJECTION, SOLUTION INTRAVENOUS at 08:31

## 2021-02-24 RX ADMIN — BUDESONIDE AND FORMOTEROL FUMARATE DIHYDRATE 2 PUFF: 160; 4.5 AEROSOL RESPIRATORY (INHALATION) at 19:52

## 2021-02-24 RX ADMIN — CEFAZOLIN SODIUM 2000 MG: 300 INJECTION, POWDER, LYOPHILIZED, FOR SOLUTION INTRAVENOUS at 21:23

## 2021-02-24 RX ADMIN — FENTANYL CITRATE 25 MCG: 50 INJECTION INTRAMUSCULAR; INTRAVENOUS at 11:02

## 2021-02-24 RX ADMIN — BUPROPION HYDROCHLORIDE 450 MG: 150 TABLET, EXTENDED RELEASE ORAL at 13:48

## 2021-02-24 RX ADMIN — SERTRALINE HYDROCHLORIDE 100 MG: 100 TABLET, FILM COATED ORAL at 15:13

## 2021-02-24 RX ADMIN — SODIUM CHLORIDE, POTASSIUM CHLORIDE, SODIUM LACTATE AND CALCIUM CHLORIDE: 600; 310; 30; 20 INJECTION, SOLUTION INTRAVENOUS at 06:58

## 2021-02-24 RX ADMIN — LIDOCAINE HYDROCHLORIDE 40 MG: 20 INJECTION, SOLUTION INFILTRATION; PERINEURAL at 07:05

## 2021-02-24 RX ADMIN — SODIUM CHLORIDE, POTASSIUM CHLORIDE, SODIUM LACTATE AND CALCIUM CHLORIDE: 600; 310; 30; 20 INJECTION, SOLUTION INTRAVENOUS at 06:30

## 2021-02-24 RX ADMIN — TRANEXAMIC ACID 1000 MG: 100 INJECTION, SOLUTION INTRAVENOUS at 07:29

## 2021-02-24 RX ADMIN — LEVOTHYROXINE SODIUM 50 MCG: 50 TABLET ORAL at 15:13

## 2021-02-24 RX ADMIN — FENTANYL CITRATE 25 MCG: 50 INJECTION INTRAMUSCULAR; INTRAVENOUS at 10:56

## 2021-02-24 RX ADMIN — CELECOXIB 200 MG: 200 CAPSULE ORAL at 06:30

## 2021-02-24 RX ADMIN — FENTANYL CITRATE 50 MCG: 50 INJECTION INTRAMUSCULAR; INTRAVENOUS at 08:31

## 2021-02-24 RX ADMIN — MULTIPLE VITAMINS W/ MINERALS TAB 1 TABLET: TAB at 15:02

## 2021-02-24 RX ADMIN — Medication 325 MG: at 21:22

## 2021-02-24 RX ADMIN — FENTANYL CITRATE 50 MCG: 50 INJECTION INTRAMUSCULAR; INTRAVENOUS at 07:35

## 2021-02-24 RX ADMIN — ACETAMINOPHEN 1000 MG: 500 TABLET ORAL at 06:30

## 2021-02-24 RX ADMIN — TOPIRAMATE 400 MG: 100 TABLET ORAL at 21:23

## 2021-02-24 RX ADMIN — PREGABALIN 75 MG: 75 CAPSULE ORAL at 06:30

## 2021-02-24 RX ADMIN — FENTANYL CITRATE 50 MCG: 50 INJECTION INTRAMUSCULAR; INTRAVENOUS at 09:47

## 2021-02-24 RX ADMIN — Medication 5 MG: at 07:05

## 2021-02-24 RX ADMIN — FENTANYL CITRATE 50 MCG: 50 INJECTION INTRAMUSCULAR; INTRAVENOUS at 07:05

## 2021-02-24 RX ADMIN — HYDROMORPHONE HYDROCHLORIDE 0.5 MG: 1 INJECTION, SOLUTION INTRAMUSCULAR; INTRAVENOUS; SUBCUTANEOUS at 08:00

## 2021-02-24 RX ADMIN — SODIUM CHLORIDE, POTASSIUM CHLORIDE, SODIUM LACTATE AND CALCIUM CHLORIDE: 600; 310; 30; 20 INJECTION, SOLUTION INTRAVENOUS at 12:53

## 2021-02-24 RX ADMIN — ASPIRIN 81 MG: 81 TABLET, CHEWABLE ORAL at 15:12

## 2021-02-24 RX ADMIN — OXYCODONE HYDROCHLORIDE 10 MG: 5 TABLET ORAL at 13:48

## 2021-02-24 RX ADMIN — KETOROLAC TROMETHAMINE 15 MG: 15 INJECTION, SOLUTION INTRAMUSCULAR; INTRAVENOUS at 13:14

## 2021-02-24 RX ADMIN — GABAPENTIN 300 MG: 300 CAPSULE ORAL at 15:12

## 2021-02-24 RX ADMIN — ONDANSETRON 4 MG: 4 TABLET, ORALLY DISINTEGRATING ORAL at 06:30

## 2021-02-24 RX ADMIN — FOLIC ACID 1 MG: 1 TABLET ORAL at 15:02

## 2021-02-24 RX ADMIN — CEFAZOLIN SODIUM 2000 MG: 300 INJECTION, POWDER, LYOPHILIZED, FOR SOLUTION INTRAVENOUS at 13:14

## 2021-02-24 RX ADMIN — DULOXETINE HYDROCHLORIDE 60 MG: 60 CAPSULE, DELAYED RELEASE ORAL at 15:02

## 2021-02-24 RX ADMIN — PROPOFOL 120 MG: 10 INJECTION, EMULSION INTRAVENOUS at 07:05

## 2021-02-24 RX ADMIN — CHLORHEXIDINE GLUCONATE 15 ML: 1.2 RINSE ORAL at 06:30

## 2021-02-24 RX ADMIN — KETOROLAC TROMETHAMINE 15 MG: 15 INJECTION, SOLUTION INTRAMUSCULAR; INTRAVENOUS at 17:15

## 2021-02-24 SDOH — HEALTH STABILITY: MENTAL HEALTH: HOW MANY STANDARD DRINKS CONTAINING ALCOHOL DO YOU HAVE ON A TYPICAL DAY?: 1 OR 2

## 2021-02-24 SDOH — HEALTH STABILITY: MENTAL HEALTH: HOW OFTEN DO YOU HAVE 6 OR MORE DRINKS ON ONE OCCASION?: NEVER

## 2021-02-24 SDOH — HEALTH STABILITY: MENTAL HEALTH: HOW OFTEN DO YOU HAVE A DRINK CONTAINING ALCOHOL?: MONTHLY OR LESS

## 2021-02-24 SDOH — SOCIAL STABILITY: SOCIAL INSECURITY: RISK FACTORS: AGE

## 2021-02-24 ASSESSMENT — PAIN SCALES - PAIN ASSESSMENT IN ADVANCED DEMENTIA (PAINAD)
TOTALSCORE: 4
NEGVOCALIZATION: OCCASIONAL MOAN OR GROAN, LOW LEVELS OF SPEECH WITH A NEGATIVE OR DISAPPROVING QUALITY
BODYLANGUAGE: TENSE, DISTRESSED, FIDGETING
FACIALEXPRESSION: SMILING OR INEXPRESSIVE
BODYLANGUAGE: RELAXED
BREATHING: NORMAL
CONSOLABILITY: NO NEED TO CONSOLE
BREATHING: NORMAL
TOTALSCORE: 0
CONSOLABILITY: DISTRACTED OR REASSURED BY VOICE OR TOUCH
FACIALEXPRESSION: SAD. FRIGHTENED. FROWNING

## 2021-02-24 ASSESSMENT — ACTIVITIES OF DAILY LIVING (ADL)
EATING: INDEPENDENT
PRIOR_ADL_TOILETING: INDEPENDENT
GROOMING: INDEPENDENT
PRIOR_ADL_BATHING: INDEPENDENT
PRIOR_ADL: INDEPENDENT

## 2021-02-24 ASSESSMENT — COGNITIVE AND FUNCTIONAL STATUS - GENERAL
BECAUSE OF A PHYSICAL, MENTAL, OR EMOTIONAL CONDITION, DO YOU HAVE SERIOUS DIFFICULTY CONCENTRATING, REMEMBERING OR MAKING DECISIONS: NO
DO YOU HAVE DIFFICULTY DRESSING OR BATHING: NO
BECAUSE OF A PHYSICAL, MENTAL, OR EMOTIONAL CONDITION, DO YOU HAVE DIFFICULTY DOING ERRANDS ALONE: NO
BASIC_MOBILITY_RAW_SCORE: 17
BASIC_MOBILITY_CONVERTED_SCORE: 39.67
DO YOU HAVE SERIOUS DIFFICULTY WALKING OR CLIMBING STAIRS: NO

## 2021-02-24 ASSESSMENT — PAIN SCALES - GENERAL
PAINLEVEL_OUTOF10: 2
PAINLEVEL_OUTOF10: 9
PAINLEVEL_OUTOF10: 0
PAINLEVEL_OUTOF10: 3
PAINLEVEL_OUTOF10: 7
PAINLEVEL_OUTOF10: 9
PAINLEVEL_OUTOF10: 8
PAINLEVEL_OUTOF10: 4

## 2021-02-24 ASSESSMENT — PULMONARY FUNCTION TESTS
FEV1_PREDICTED: 86
FEV1/FVC_PERCENT_PREDICTED: 50-69%
FEV1_PREDICTED: 101
FEV1: 2.23
FEV1: 101
FEV1/FVC: 50-69%
FEV1: 101
FEV1_PREDICTED: 83
FEV1/FVC_PERCENT_PREDICTED: 66
FEV1_PREDICTED: 69

## 2021-02-24 ASSESSMENT — PAIN SCALES - WONG BAKER: WONGBAKER_NUMERICALRESPONSE: 0

## 2021-02-24 ASSESSMENT — ENCOUNTER SYMPTOMS: EXERCISE TOLERANCE: GOOD (>4 METS)

## 2021-02-25 ENCOUNTER — APPOINTMENT (OUTPATIENT)
Dept: REHABILITATION | Age: 66
End: 2021-02-25

## 2021-02-25 VITALS
WEIGHT: 142.5 LBS | BODY MASS INDEX: 23.74 KG/M2 | HEART RATE: 80 BPM | RESPIRATION RATE: 18 BRPM | OXYGEN SATURATION: 100 % | SYSTOLIC BLOOD PRESSURE: 138 MMHG | TEMPERATURE: 98.9 F | DIASTOLIC BLOOD PRESSURE: 63 MMHG | HEIGHT: 65 IN

## 2021-02-25 LAB
ANION GAP SERPL CALC-SCNC: 11 MMOL/L (ref 10–20)
BASOPHILS # BLD: 0.1 K/MCL (ref 0–0.3)
BASOPHILS NFR BLD: 1 %
BUN SERPL-MCNC: 25 MG/DL (ref 6–20)
BUN/CREAT SERPL: 16 (ref 7–25)
CALCIUM SERPL-MCNC: 8.2 MG/DL (ref 8.4–10.2)
CHLORIDE SERPL-SCNC: 106 MMOL/L (ref 98–107)
CO2 SERPL-SCNC: 26 MMOL/L (ref 21–32)
CREAT SERPL-MCNC: 1.52 MG/DL (ref 0.51–0.95)
DEPRECATED RDW RBC: 51.8 FL (ref 39–50)
EOSINOPHIL # BLD: 0 K/MCL (ref 0–0.5)
EOSINOPHIL NFR BLD: 0 %
ERYTHROCYTE [DISTWIDTH] IN BLOOD: 13.1 % (ref 11–15)
FASTING DURATION TIME PATIENT: ABNORMAL H
GFR SERPLBLD BASED ON 1.73 SQ M-ARVRAT: 35 ML/MIN/1.73M2
GLUCOSE SERPL-MCNC: 114 MG/DL (ref 65–99)
HCT VFR BLD CALC: 25.8 % (ref 36–46.5)
HGB BLD-MCNC: 8.1 G/DL (ref 12–15.5)
IMM GRANULOCYTES # BLD AUTO: 0 K/MCL (ref 0–0.2)
IMM GRANULOCYTES # BLD: 0 %
LYMPHOCYTES # BLD: 2.2 K/MCL (ref 1–4)
LYMPHOCYTES NFR BLD: 35 %
MCH RBC QN AUTO: 34 PG (ref 26–34)
MCHC RBC AUTO-ENTMCNC: 31.4 G/DL (ref 32–36.5)
MCV RBC AUTO: 108.4 FL (ref 78–100)
MONOCYTES # BLD: 0.7 K/MCL (ref 0.3–0.9)
MONOCYTES NFR BLD: 11 %
NEUTROPHILS # BLD: 3.4 K/MCL (ref 1.8–7.7)
NEUTROPHILS NFR BLD: 53 %
NRBC BLD MANUAL-RTO: 0 /100 WBC
PLATELET # BLD AUTO: 244 K/MCL (ref 140–450)
POTASSIUM SERPL-SCNC: 4.9 MMOL/L (ref 3.4–5.1)
RBC # BLD: 2.38 MIL/MCL (ref 4–5.2)
SODIUM SERPL-SCNC: 138 MMOL/L (ref 135–145)
WBC # BLD: 6.3 K/MCL (ref 4.2–11)

## 2021-02-25 PROCEDURE — 96372 THER/PROPH/DIAG INJ SC/IM: CPT | Performed by: ORTHOPAEDIC SURGERY

## 2021-02-25 PROCEDURE — 10002803 HB RX 637: Performed by: ORTHOPAEDIC SURGERY

## 2021-02-25 PROCEDURE — 85025 COMPLETE CBC W/AUTO DIFF WBC: CPT | Performed by: ORTHOPAEDIC SURGERY

## 2021-02-25 PROCEDURE — 97535 SELF CARE MNGMENT TRAINING: CPT

## 2021-02-25 PROCEDURE — 94640 AIRWAY INHALATION TREATMENT: CPT

## 2021-02-25 PROCEDURE — 97116 GAIT TRAINING THERAPY: CPT

## 2021-02-25 PROCEDURE — 36415 COLL VENOUS BLD VENIPUNCTURE: CPT | Performed by: ORTHOPAEDIC SURGERY

## 2021-02-25 PROCEDURE — 10004173 HB COUNTER-THERAPY VISIT PT

## 2021-02-25 PROCEDURE — 80048 BASIC METABOLIC PNL TOTAL CA: CPT | Performed by: ORTHOPAEDIC SURGERY

## 2021-02-25 PROCEDURE — 10002801 HB RX 250 W/O HCPCS: Performed by: ORTHOPAEDIC SURGERY

## 2021-02-25 PROCEDURE — 97530 THERAPEUTIC ACTIVITIES: CPT

## 2021-02-25 PROCEDURE — 10004172 HB COUNTER-THERAPY VISIT OT

## 2021-02-25 PROCEDURE — 97165 OT EVAL LOW COMPLEX 30 MIN: CPT

## 2021-02-25 PROCEDURE — 10004651 HB RX, NO CHARGE ITEM: Performed by: ORTHOPAEDIC SURGERY

## 2021-02-25 PROCEDURE — 10002800 HB RX 250 W HCPCS: Performed by: ORTHOPAEDIC SURGERY

## 2021-02-25 RX ORDER — ASPIRIN 81 MG/1
81 TABLET, CHEWABLE ORAL 2 TIMES DAILY
Qty: 60 TABLET | Refills: 0 | Status: ON HOLD | OUTPATIENT
Start: 2021-02-25 | End: 2023-11-09

## 2021-02-25 RX ORDER — ACETAMINOPHEN 325 MG/1
325 TABLET ORAL EVERY 6 HOURS PRN
Status: SHIPPED | COMMUNITY
Start: 2021-02-25 | End: 2021-04-02 | Stop reason: ALTCHOICE

## 2021-02-25 RX ORDER — OXYCODONE HYDROCHLORIDE 5 MG/1
5 TABLET ORAL EVERY 4 HOURS PRN
Qty: 35 TABLET | Refills: 0 | Status: SHIPPED | OUTPATIENT
Start: 2021-02-25 | End: 2021-03-02 | Stop reason: SDUPTHER

## 2021-02-25 RX ORDER — AMOXICILLIN 250 MG
2 CAPSULE ORAL 2 TIMES DAILY PRN
Status: SHIPPED | COMMUNITY
Start: 2021-02-25 | End: 2022-08-23 | Stop reason: ALTCHOICE

## 2021-02-25 RX ADMIN — TOPIRAMATE 200 MG: 100 TABLET ORAL at 05:39

## 2021-02-25 RX ADMIN — OXYCODONE HYDROCHLORIDE 5 MG: 5 TABLET ORAL at 01:15

## 2021-02-25 RX ADMIN — Medication 100 MG: at 09:46

## 2021-02-25 RX ADMIN — OXYCODONE HYDROCHLORIDE 10 MG: 5 TABLET ORAL at 10:46

## 2021-02-25 RX ADMIN — PANTOPRAZOLE SODIUM 40 MG: 40 TABLET, DELAYED RELEASE ORAL at 05:38

## 2021-02-25 RX ADMIN — MULTIPLE VITAMINS W/ MINERALS TAB 1 TABLET: TAB at 09:46

## 2021-02-25 RX ADMIN — LEVOTHYROXINE SODIUM 50 MCG: 50 TABLET ORAL at 05:38

## 2021-02-25 RX ADMIN — CETIRIZINE HYDROCHLORIDE 10 MG: 10 TABLET ORAL at 09:47

## 2021-02-25 RX ADMIN — DULOXETINE HYDROCHLORIDE 60 MG: 60 CAPSULE, DELAYED RELEASE ORAL at 09:47

## 2021-02-25 RX ADMIN — Medication 325 MG: at 09:47

## 2021-02-25 RX ADMIN — ASPIRIN 81 MG: 81 TABLET, CHEWABLE ORAL at 09:48

## 2021-02-25 RX ADMIN — Medication 1000 UNITS: at 09:47

## 2021-02-25 RX ADMIN — OXYCODONE HYDROCHLORIDE 10 MG: 5 TABLET ORAL at 05:38

## 2021-02-25 RX ADMIN — MODAFINIL 200 MG: 100 TABLET ORAL at 09:46

## 2021-02-25 RX ADMIN — GABAPENTIN 300 MG: 300 CAPSULE ORAL at 09:47

## 2021-02-25 RX ADMIN — SERTRALINE HYDROCHLORIDE 100 MG: 100 TABLET, FILM COATED ORAL at 09:47

## 2021-02-25 RX ADMIN — Medication 325 MG: at 13:29

## 2021-02-25 RX ADMIN — BUPROPION HYDROCHLORIDE 450 MG: 150 TABLET, EXTENDED RELEASE ORAL at 09:48

## 2021-02-25 RX ADMIN — DEXAMETHASONE SODIUM PHOSPHATE 10 MG: 10 INJECTION, SOLUTION INTRAMUSCULAR; INTRAVENOUS at 05:38

## 2021-02-25 RX ADMIN — FOLIC ACID 1 MG: 1 TABLET ORAL at 09:47

## 2021-02-25 RX ADMIN — TRAZODONE HYDROCHLORIDE 200 MG: 100 TABLET ORAL at 01:16

## 2021-02-25 RX ADMIN — ENOXAPARIN SODIUM 40 MG: 40 INJECTION SUBCUTANEOUS at 09:46

## 2021-02-25 RX ADMIN — BUDESONIDE AND FORMOTEROL FUMARATE DIHYDRATE 2 PUFF: 160; 4.5 AEROSOL RESPIRATORY (INHALATION) at 07:41

## 2021-02-25 ASSESSMENT — PAIN SCALES - GENERAL
PAINLEVEL_OUTOF10: 4
PAINLEVEL_OUTOF10: 7
PAINLEVEL_OUTOF10: 6
PAINLEVEL_OUTOF10: 5
PAINLEVEL_OUTOF10: 4
PAINLEVEL_OUTOF10: 4
PAINLEVEL_OUTOF10: 3
PAINLEVEL_OUTOF10: 4

## 2021-02-25 ASSESSMENT — ACTIVITIES OF DAILY LIVING (ADL)
GROOMING: INDEPENDENT
EATING: INDEPENDENT
PRIOR_ADL_BATHING: INDEPENDENT
PRIOR_ADL_BATHING: INDEPENDENT
HOME_MANAGEMENT_TIME_ENTRY: 30
GROOMING: INDEPENDENT
PRIOR_ADL_TOILETING: INDEPENDENT
EATING: INDEPENDENT
PRIOR_ADL: INDEPENDENT
PRIOR_ADL_TOILETING: INDEPENDENT
PRIOR_ADL: INDEPENDENT

## 2021-02-25 ASSESSMENT — COGNITIVE AND FUNCTIONAL STATUS - GENERAL
BASIC_MOBILITY_RAW_SCORE: 24
BASIC_MOBILITY_CONVERTED_SCORE: 57.68
HELP NEEDED FOR BATHING: A LITTLE
DAILY_ACTIVITY_RAW_SCORE: 23
DAILY_ACTIVITY_CONVERTED_SCORE: 51.12

## 2021-02-25 ASSESSMENT — PAIN SCALES - WONG BAKER: WONGBAKER_NUMERICALRESPONSE: 6

## 2021-02-26 ENCOUNTER — TELEPHONE (OUTPATIENT)
Dept: ORTHOPEDICS | Age: 66
End: 2021-02-26

## 2021-03-01 ENCOUNTER — CASE MANAGEMENT (OUTPATIENT)
Dept: CARE COORDINATION | Age: 66
End: 2021-03-01

## 2021-03-02 ENCOUNTER — IMAGING SERVICES (OUTPATIENT)
Dept: GENERAL RADIOLOGY | Age: 66
End: 2021-03-02
Attending: PHYSICIAN ASSISTANT

## 2021-03-02 ENCOUNTER — OFFICE VISIT (OUTPATIENT)
Dept: ORTHOPEDICS | Age: 66
End: 2021-03-02

## 2021-03-02 DIAGNOSIS — Z98.890 STATUS POST SURGERY: ICD-10-CM

## 2021-03-02 DIAGNOSIS — Z96.649 S/P REVISION OF TOTAL HIP: Primary | ICD-10-CM

## 2021-03-02 PROCEDURE — 73501 X-RAY EXAM HIP UNI 1 VIEW: CPT | Performed by: RADIOLOGY

## 2021-03-02 PROCEDURE — 99024 POSTOP FOLLOW-UP VISIT: CPT | Performed by: PHYSICIAN ASSISTANT

## 2021-03-02 RX ORDER — OXYCODONE HYDROCHLORIDE 5 MG/1
5-10 TABLET ORAL EVERY 6 HOURS PRN
Qty: 30 TABLET | Refills: 0 | Status: SHIPPED | OUTPATIENT
Start: 2021-03-02 | End: 2021-03-11 | Stop reason: SDUPTHER

## 2021-03-02 SDOH — SOCIAL STABILITY: SOCIAL NETWORK: ARE YOU MARRIED, WIDOWED, DIVORCED, SEPARATED, NEVER MARRIED, OR LIVING WITH A PARTNER?: DIVORCED

## 2021-03-02 SDOH — ECONOMIC STABILITY: FOOD INSECURITY: WITHIN THE PAST 12 MONTHS, YOU WORRIED THAT YOUR FOOD WOULD RUN OUT BEFORE YOU GOT MONEY TO BUY MORE.: NEVER TRUE

## 2021-03-02 SDOH — HEALTH STABILITY: MENTAL HEALTH: HOW MANY STANDARD DRINKS CONTAINING ALCOHOL DO YOU HAVE ON A TYPICAL DAY?: 1 OR 2

## 2021-03-02 SDOH — SOCIAL STABILITY: SOCIAL INSECURITY: WITHIN THE LAST YEAR, HAVE YOU BEEN AFRAID OF YOUR PARTNER OR EX-PARTNER?: NO

## 2021-03-02 SDOH — SOCIAL STABILITY: SOCIAL NETWORK: HOW OFTEN DO YOU GET TOGETHER WITH FRIENDS OR RELATIVES?: MORE THAN THREE TIMES A WEEK

## 2021-03-02 SDOH — ECONOMIC STABILITY: FOOD INSECURITY: WITHIN THE PAST 12 MONTHS, THE FOOD YOU BOUGHT JUST DIDN'T LAST AND YOU DIDN'T HAVE MONEY TO GET MORE.: NEVER TRUE

## 2021-03-02 SDOH — SOCIAL STABILITY: SOCIAL INSECURITY: WITHIN THE LAST YEAR, HAVE YOU BEEN HUMILIATED OR EMOTIONALLY ABUSED IN OTHER WAYS BY YOUR PARTNER OR EX-PARTNER?: NO

## 2021-03-02 SDOH — HEALTH STABILITY: MENTAL HEALTH: HOW OFTEN DO YOU HAVE A DRINK CONTAINING ALCOHOL?: MONTHLY OR LESS

## 2021-03-02 SDOH — HEALTH STABILITY: MENTAL HEALTH: HOW OFTEN DO YOU HAVE 6 OR MORE DRINKS ON ONE OCCASION?: NEVER

## 2021-03-02 SDOH — ECONOMIC STABILITY: INCOME INSECURITY: HOW HARD IS IT FOR YOU TO PAY FOR THE VERY BASICS LIKE FOOD, HOUSING, MEDICAL CARE, AND HEATING?: NOT VERY HARD

## 2021-03-02 SDOH — SOCIAL STABILITY: SOCIAL NETWORK: HOW OFTEN DO YOU ATTENT MEETINGS OF THE CLUB OR ORGANIZATION YOU BELONG TO?: NEVER

## 2021-03-02 SDOH — HEALTH STABILITY: PHYSICAL HEALTH: ON AVERAGE, HOW MANY DAYS PER WEEK DO YOU ENGAGE IN MODERATE TO STRENUOUS EXERCISE (LIKE A BRISK WALK)?: 6 DAYS

## 2021-03-02 SDOH — SOCIAL STABILITY: SOCIAL NETWORK: HOW OFTEN DO YOU ATTEND CHURCH OR RELIGIOUS SERVICES?: 1 TO 4 TIMES PER YEAR

## 2021-03-02 SDOH — HEALTH STABILITY: PHYSICAL HEALTH: ON AVERAGE, HOW MANY MINUTES DO YOU ENGAGE IN EXERCISE AT THIS LEVEL?: 20 MIN

## 2021-03-08 ENCOUNTER — IMMUNIZATION (OUTPATIENT)
Dept: LAB | Age: 66
End: 2021-03-08

## 2021-03-08 ENCOUNTER — CASE MANAGEMENT (OUTPATIENT)
Dept: CARE COORDINATION | Age: 66
End: 2021-03-08

## 2021-03-08 DIAGNOSIS — Z23 NEED FOR VACCINATION: Primary | ICD-10-CM

## 2021-03-08 PROCEDURE — 91300 COVID 19 PFIZER-BIONTECH: CPT | Performed by: INTERNAL MEDICINE

## 2021-03-08 PROCEDURE — 0001A COVID 19 PFIZER-BIONTECH: CPT | Performed by: INTERNAL MEDICINE

## 2021-03-08 RX ORDER — BUPROPION HYDROCHLORIDE 150 MG/1
TABLET ORAL
Qty: 90 TABLET | Refills: 0 | Status: SHIPPED | OUTPATIENT
Start: 2021-03-08 | End: 2021-04-02 | Stop reason: SDUPTHER

## 2021-03-10 LAB
BACTERIA IMPLNT DEV CULT: NORMAL
GRAM STN SPEC: NORMAL

## 2021-03-11 RX ORDER — OXYCODONE HYDROCHLORIDE 5 MG/1
5-10 TABLET ORAL EVERY 8 HOURS PRN
Qty: 30 TABLET | Refills: 0 | Status: SHIPPED | OUTPATIENT
Start: 2021-03-11 | End: 2021-03-16 | Stop reason: SDUPTHER

## 2021-03-12 RX ORDER — DULOXETIN HYDROCHLORIDE 60 MG/1
CAPSULE, DELAYED RELEASE ORAL
Qty: 90 CAPSULE | Refills: 0 | Status: SHIPPED | OUTPATIENT
Start: 2021-03-12 | End: 2021-04-02 | Stop reason: SDUPTHER

## 2021-03-16 ENCOUNTER — OFFICE VISIT (OUTPATIENT)
Dept: ORTHOPEDICS | Age: 66
End: 2021-03-16

## 2021-03-16 DIAGNOSIS — Z96.649 S/P REVISION OF TOTAL HIP: Primary | ICD-10-CM

## 2021-03-16 PROCEDURE — 99024 POSTOP FOLLOW-UP VISIT: CPT | Performed by: PHYSICIAN ASSISTANT

## 2021-03-16 RX ORDER — CEPHALEXIN 500 MG/1
500 CAPSULE ORAL 4 TIMES DAILY
Qty: 28 CAPSULE | Refills: 0 | Status: SHIPPED | OUTPATIENT
Start: 2021-03-16 | End: 2021-03-23

## 2021-03-16 RX ORDER — OXYCODONE HYDROCHLORIDE 5 MG/1
5 TABLET ORAL EVERY 6 HOURS PRN
Qty: 30 TABLET | Refills: 0 | Status: SHIPPED | OUTPATIENT
Start: 2021-03-18 | End: 2021-03-22 | Stop reason: SDUPTHER

## 2021-03-16 SDOH — HEALTH STABILITY: MENTAL HEALTH: HOW OFTEN DO YOU HAVE A DRINK CONTAINING ALCOHOL?: MONTHLY OR LESS

## 2021-03-16 SDOH — HEALTH STABILITY: MENTAL HEALTH: HOW MANY STANDARD DRINKS CONTAINING ALCOHOL DO YOU HAVE ON A TYPICAL DAY?: 1 OR 2

## 2021-03-16 SDOH — HEALTH STABILITY: MENTAL HEALTH: HOW OFTEN DO YOU HAVE 6 OR MORE DRINKS ON ONE OCCASION?: NEVER

## 2021-03-18 ENCOUNTER — HOSPITAL ENCOUNTER (OUTPATIENT)
Dept: GENERAL RADIOLOGY | Age: 66
Discharge: HOME | End: 2021-03-18
Attending: PAIN MEDICINE

## 2021-03-18 ENCOUNTER — CASE MANAGEMENT (OUTPATIENT)
Dept: CARE COORDINATION | Age: 66
End: 2021-03-18

## 2021-03-18 ENCOUNTER — HOSPITAL ENCOUNTER (OUTPATIENT)
Dept: PAIN MANAGEMENT | Age: 66
Discharge: HOME | End: 2021-03-18
Attending: PAIN MEDICINE

## 2021-03-18 VITALS
OXYGEN SATURATION: 99 % | SYSTOLIC BLOOD PRESSURE: 121 MMHG | HEART RATE: 78 BPM | DIASTOLIC BLOOD PRESSURE: 56 MMHG | RESPIRATION RATE: 16 BRPM

## 2021-03-18 DIAGNOSIS — M51.36 DDD (DEGENERATIVE DISC DISEASE), LUMBAR: Primary | ICD-10-CM

## 2021-03-18 DIAGNOSIS — M54.17 LUMBOSACRAL NEURITIS: ICD-10-CM

## 2021-03-18 DIAGNOSIS — M51.37 DEGENERATION OF LUMBAR OR LUMBOSACRAL INTERVERTEBRAL DISC: ICD-10-CM

## 2021-03-18 DIAGNOSIS — M54.16 LUMBAR RADICULOPATHY: ICD-10-CM

## 2021-03-18 PROCEDURE — 10005267 HB INJECT FORAMEN INITIAL BILATERAL

## 2021-03-18 PROCEDURE — 10005266 HB INJECT FORAMEN ADDL UNILATERAL

## 2021-03-18 PROCEDURE — 10002800 HB RX 250 W HCPCS: Performed by: PAIN MEDICINE

## 2021-03-18 PROCEDURE — 10005281 FL PACS RECORD NR

## 2021-03-18 PROCEDURE — 10002805 HB CONTRAST AGENT: Performed by: PAIN MEDICINE

## 2021-03-18 PROCEDURE — 10004187 HB COUNTER-VISIT CENSUS, OUTPATIENT

## 2021-03-18 RX ORDER — DEXAMETHASONE SODIUM PHOSPHATE 10 MG/ML
20 INJECTION, SOLUTION INTRAMUSCULAR; INTRAVENOUS ONCE
Status: COMPLETED | OUTPATIENT
Start: 2021-03-18 | End: 2021-03-18

## 2021-03-18 RX ORDER — IOPAMIDOL 408 MG/ML
3 INJECTION, SOLUTION INTRATHECAL ONCE
Status: COMPLETED | OUTPATIENT
Start: 2021-03-18 | End: 2021-03-18

## 2021-03-18 RX ADMIN — IOPAMIDOL 3 ML: 408 INJECTION, SOLUTION INTRATHECAL at 10:06

## 2021-03-18 RX ADMIN — DEXAMETHASONE SODIUM PHOSPHATE 20 MG: 10 INJECTION, SOLUTION INTRAMUSCULAR; INTRAVENOUS at 10:06

## 2021-03-18 ASSESSMENT — PAIN SCALES - GENERAL: PAINLEVEL_OUTOF10: 6

## 2021-03-22 ENCOUNTER — OFFICE VISIT (OUTPATIENT)
Dept: ORTHOPEDICS | Age: 66
End: 2021-03-22

## 2021-03-22 DIAGNOSIS — Z96.649 S/P REVISION OF TOTAL HIP: Primary | ICD-10-CM

## 2021-03-22 PROCEDURE — 99024 POSTOP FOLLOW-UP VISIT: CPT | Performed by: PHYSICIAN ASSISTANT

## 2021-03-22 RX ORDER — OXYCODONE HYDROCHLORIDE 5 MG/1
5-10 TABLET ORAL EVERY 8 HOURS PRN
Qty: 30 TABLET | Refills: 0 | Status: SHIPPED | OUTPATIENT
Start: 2021-03-22 | End: 2021-04-12 | Stop reason: ALTCHOICE

## 2021-03-22 SDOH — SOCIAL STABILITY: SOCIAL NETWORK: HOW OFTEN DO YOU ATTENT MEETINGS OF THE CLUB OR ORGANIZATION YOU BELONG TO?: NEVER

## 2021-03-22 SDOH — HEALTH STABILITY: MENTAL HEALTH: HOW OFTEN DO YOU HAVE A DRINK CONTAINING ALCOHOL?: MONTHLY OR LESS

## 2021-03-22 SDOH — SOCIAL STABILITY: SOCIAL INSECURITY: WITHIN THE LAST YEAR, HAVE YOU BEEN HUMILIATED OR EMOTIONALLY ABUSED IN OTHER WAYS BY YOUR PARTNER OR EX-PARTNER?: NO

## 2021-03-22 SDOH — SOCIAL STABILITY: SOCIAL NETWORK: ARE YOU MARRIED, WIDOWED, DIVORCED, SEPARATED, NEVER MARRIED, OR LIVING WITH A PARTNER?: DIVORCED

## 2021-03-22 SDOH — HEALTH STABILITY: MENTAL HEALTH: HOW OFTEN DO YOU HAVE 6 OR MORE DRINKS ON ONE OCCASION?: NEVER

## 2021-03-22 SDOH — ECONOMIC STABILITY: FOOD INSECURITY: WITHIN THE PAST 12 MONTHS, THE FOOD YOU BOUGHT JUST DIDN'T LAST AND YOU DIDN'T HAVE MONEY TO GET MORE.: NEVER TRUE

## 2021-03-22 SDOH — ECONOMIC STABILITY: FOOD INSECURITY: WITHIN THE PAST 12 MONTHS, YOU WORRIED THAT YOUR FOOD WOULD RUN OUT BEFORE YOU GOT MONEY TO BUY MORE.: NEVER TRUE

## 2021-03-22 SDOH — SOCIAL STABILITY: SOCIAL NETWORK: HOW OFTEN DO YOU GET TOGETHER WITH FRIENDS OR RELATIVES?: MORE THAN THREE TIMES A WEEK

## 2021-03-22 SDOH — SOCIAL STABILITY: SOCIAL INSECURITY: WITHIN THE LAST YEAR, HAVE YOU BEEN AFRAID OF YOUR PARTNER OR EX-PARTNER?: NO

## 2021-03-22 SDOH — ECONOMIC STABILITY: INCOME INSECURITY: HOW HARD IS IT FOR YOU TO PAY FOR THE VERY BASICS LIKE FOOD, HOUSING, MEDICAL CARE, AND HEATING?: NOT VERY HARD

## 2021-03-22 SDOH — SOCIAL STABILITY: SOCIAL NETWORK: HOW OFTEN DO YOU ATTEND CHURCH OR RELIGIOUS SERVICES?: 1 TO 4 TIMES PER YEAR

## 2021-03-22 SDOH — HEALTH STABILITY: MENTAL HEALTH: HOW MANY STANDARD DRINKS CONTAINING ALCOHOL DO YOU HAVE ON A TYPICAL DAY?: 1 OR 2

## 2021-03-22 SDOH — HEALTH STABILITY: PHYSICAL HEALTH: ON AVERAGE, HOW MANY DAYS PER WEEK DO YOU ENGAGE IN MODERATE TO STRENUOUS EXERCISE (LIKE A BRISK WALK)?: 6 DAYS

## 2021-03-22 SDOH — HEALTH STABILITY: PHYSICAL HEALTH: ON AVERAGE, HOW MANY MINUTES DO YOU ENGAGE IN EXERCISE AT THIS LEVEL?: 20 MIN

## 2021-03-29 ENCOUNTER — IMMUNIZATION (OUTPATIENT)
Dept: LAB | Age: 66
End: 2021-03-29

## 2021-03-29 DIAGNOSIS — Z23 NEED FOR VACCINATION: Primary | ICD-10-CM

## 2021-03-29 PROCEDURE — 91300 COVID 19 PFIZER-BIONTECH: CPT | Performed by: INTERNAL MEDICINE

## 2021-03-29 PROCEDURE — 0002A COVID 19 PFIZER-BIONTECH: CPT | Performed by: INTERNAL MEDICINE

## 2021-04-01 ENCOUNTER — CASE MANAGEMENT (OUTPATIENT)
Dept: CARE COORDINATION | Age: 66
End: 2021-04-01

## 2021-04-02 ENCOUNTER — BEHAVIORAL HEALTH (OUTPATIENT)
Dept: BEHAVIORAL HEALTH | Age: 66
End: 2021-04-02
Attending: FAMILY MEDICINE

## 2021-04-02 ENCOUNTER — V-VISIT (OUTPATIENT)
Dept: BEHAVIORAL HEALTH | Age: 66
End: 2021-04-02
Attending: FAMILY MEDICINE

## 2021-04-02 DIAGNOSIS — F31.12 BIPOLAR AFFECTIVE DISORDER, CURRENTLY MANIC, MODERATE (CMD): Primary | ICD-10-CM

## 2021-04-02 DIAGNOSIS — F41.1 GENERALIZED ANXIETY DISORDER: ICD-10-CM

## 2021-04-02 DIAGNOSIS — F33.0 MILD EPISODE OF RECURRENT MAJOR DEPRESSIVE DISORDER (CMD): Primary | ICD-10-CM

## 2021-04-02 PROCEDURE — 90792 PSYCH DIAG EVAL W/MED SRVCS: CPT | Performed by: NURSE PRACTITIONER

## 2021-04-02 RX ORDER — BUPROPION HYDROCHLORIDE 300 MG/1
300 TABLET ORAL DAILY
Qty: 90 TABLET | Refills: 0 | Status: SHIPPED | OUTPATIENT
Start: 2021-04-02 | End: 2021-04-12 | Stop reason: SDUPTHER

## 2021-04-02 RX ORDER — DULOXETIN HYDROCHLORIDE 60 MG/1
60 CAPSULE, DELAYED RELEASE ORAL 2 TIMES DAILY
Qty: 180 CAPSULE | Refills: 0 | Status: SHIPPED | OUTPATIENT
Start: 2021-04-02 | End: 2021-04-12 | Stop reason: SDUPTHER

## 2021-04-02 RX ORDER — LAMOTRIGINE 25 MG/1
TABLET ORAL
Qty: 60 TABLET | Refills: 3 | Status: SHIPPED | OUTPATIENT
Start: 2021-04-02 | End: 2021-07-30 | Stop reason: SDUPTHER

## 2021-04-05 ENCOUNTER — TELEPHONE (OUTPATIENT)
Dept: FAMILY MEDICINE | Age: 66
End: 2021-04-05

## 2021-04-05 DIAGNOSIS — M79.89 LEG SWELLING: ICD-10-CM

## 2021-04-05 RX ORDER — BUPROPION HYDROCHLORIDE 150 MG/1
TABLET ORAL
Qty: 90 TABLET | Refills: 0 | OUTPATIENT
Start: 2021-04-05

## 2021-04-06 ENCOUNTER — IMAGING SERVICES (OUTPATIENT)
Dept: GENERAL RADIOLOGY | Age: 66
End: 2021-04-06
Attending: ORTHOPAEDIC SURGERY

## 2021-04-06 ENCOUNTER — OFFICE VISIT (OUTPATIENT)
Dept: ORTHOPEDICS | Age: 66
End: 2021-04-06

## 2021-04-06 VITALS
HEART RATE: 76 BPM | RESPIRATION RATE: 16 BRPM | BODY MASS INDEX: 25.49 KG/M2 | BODY MASS INDEX: 26.49 KG/M2 | HEART RATE: 80 BPM | TEMPERATURE: 97.8 F | SYSTOLIC BLOOD PRESSURE: 95 MMHG | OXYGEN SATURATION: 99 % | WEIGHT: 159 LBS | DIASTOLIC BLOOD PRESSURE: 53 MMHG | HEIGHT: 65 IN | OXYGEN SATURATION: 98 % | RESPIRATION RATE: 18 BRPM | TEMPERATURE: 98.4 F | WEIGHT: 153 LBS | HEIGHT: 65 IN | SYSTOLIC BLOOD PRESSURE: 130 MMHG | DIASTOLIC BLOOD PRESSURE: 78 MMHG

## 2021-04-06 DIAGNOSIS — Z96.649 S/P REVISION OF TOTAL HIP: Primary | ICD-10-CM

## 2021-04-06 DIAGNOSIS — Z96.649 S/P REVISION OF TOTAL HIP: ICD-10-CM

## 2021-04-06 PROCEDURE — 73502 X-RAY EXAM HIP UNI 2-3 VIEWS: CPT | Performed by: RADIOLOGY

## 2021-04-06 PROCEDURE — 99024 POSTOP FOLLOW-UP VISIT: CPT | Performed by: ORTHOPAEDIC SURGERY

## 2021-04-06 RX ORDER — FUROSEMIDE 80 MG
80 TABLET ORAL DAILY
Qty: 90 TABLET | Refills: 1 | Status: SHIPPED | OUTPATIENT
Start: 2021-04-06 | End: 2021-06-22 | Stop reason: SDUPTHER

## 2021-04-06 RX ORDER — ESOMEPRAZOLE MAGNESIUM 40 MG/1
40 CAPSULE, DELAYED RELEASE ORAL
Qty: 90 CAPSULE | Refills: 1 | Status: SHIPPED | OUTPATIENT
Start: 2021-04-06 | End: 2021-04-12 | Stop reason: ALTCHOICE

## 2021-04-06 RX ORDER — TRAMADOL HYDROCHLORIDE 50 MG/1
50 TABLET ORAL EVERY 8 HOURS PRN
Qty: 30 TABLET | Refills: 0 | Status: SHIPPED | OUTPATIENT
Start: 2021-04-06 | End: 2021-09-08 | Stop reason: ALTCHOICE

## 2021-04-06 RX ORDER — QUINAPRIL 20 MG/1
20 TABLET ORAL DAILY
Qty: 90 TABLET | Refills: 1 | Status: SHIPPED | OUTPATIENT
Start: 2021-04-06 | End: 2021-10-07

## 2021-04-08 ENCOUNTER — CASE MANAGEMENT (OUTPATIENT)
Dept: CARE COORDINATION | Age: 66
End: 2021-04-08

## 2021-04-08 ENCOUNTER — HOSPITAL ENCOUNTER (OUTPATIENT)
Dept: PAIN MANAGEMENT | Age: 66
Discharge: HOME | End: 2021-04-08
Attending: PAIN MEDICINE

## 2021-04-08 ENCOUNTER — HOSPITAL ENCOUNTER (OUTPATIENT)
Dept: GENERAL RADIOLOGY | Age: 66
Discharge: HOME | End: 2021-04-08
Attending: PAIN MEDICINE

## 2021-04-08 VITALS
SYSTOLIC BLOOD PRESSURE: 128 MMHG | HEART RATE: 78 BPM | DIASTOLIC BLOOD PRESSURE: 62 MMHG | TEMPERATURE: 97.7 F | OXYGEN SATURATION: 99 % | RESPIRATION RATE: 15 BRPM

## 2021-04-08 DIAGNOSIS — M54.16 LUMBAR RADICULOPATHY: ICD-10-CM

## 2021-04-08 DIAGNOSIS — M51.37 DEGENERATION OF LUMBAR OR LUMBOSACRAL INTERVERTEBRAL DISC: ICD-10-CM

## 2021-04-08 DIAGNOSIS — F41.1 GENERALIZED ANXIETY DISORDER: ICD-10-CM

## 2021-04-08 DIAGNOSIS — M79.605 BILATERAL LEG PAIN: ICD-10-CM

## 2021-04-08 DIAGNOSIS — M54.17 LUMBOSACRAL NEURITIS: ICD-10-CM

## 2021-04-08 DIAGNOSIS — M79.604 BILATERAL LEG PAIN: ICD-10-CM

## 2021-04-08 DIAGNOSIS — M51.36 DDD (DEGENERATIVE DISC DISEASE), LUMBAR: Primary | ICD-10-CM

## 2021-04-08 DIAGNOSIS — Z72.820 SLEEP DEPRIVATION: ICD-10-CM

## 2021-04-08 DIAGNOSIS — F31.12 BIPOLAR AFFECTIVE DISORDER, CURRENTLY MANIC, MODERATE (CMD): ICD-10-CM

## 2021-04-08 PROCEDURE — 10002800 HB RX 250 W HCPCS: Performed by: PAIN MEDICINE

## 2021-04-08 PROCEDURE — 10004187 HB COUNTER-VISIT CENSUS, OUTPATIENT

## 2021-04-08 PROCEDURE — 10005281 FL PACS RECORD NR

## 2021-04-08 PROCEDURE — 10005266 HB INJECT FORAMEN ADDL UNILATERAL

## 2021-04-08 PROCEDURE — 10005267 HB INJECT FORAMEN INITIAL BILATERAL

## 2021-04-08 PROCEDURE — 10002805 HB CONTRAST AGENT: Performed by: PAIN MEDICINE

## 2021-04-08 RX ORDER — DULOXETIN HYDROCHLORIDE 60 MG/1
60 CAPSULE, DELAYED RELEASE ORAL 2 TIMES DAILY
Qty: 180 CAPSULE | Refills: 0 | Status: CANCELLED | OUTPATIENT
Start: 2021-04-08

## 2021-04-08 RX ORDER — LAMOTRIGINE 25 MG/1
TABLET ORAL
Qty: 60 TABLET | Refills: 3 | Status: CANCELLED | OUTPATIENT
Start: 2021-04-08

## 2021-04-08 RX ORDER — DEXAMETHASONE SODIUM PHOSPHATE 10 MG/ML
20 INJECTION, SOLUTION INTRAMUSCULAR; INTRAVENOUS ONCE
Status: COMPLETED | OUTPATIENT
Start: 2021-04-08 | End: 2021-04-08

## 2021-04-08 RX ORDER — BUPROPION HYDROCHLORIDE 300 MG/1
300 TABLET ORAL DAILY
Qty: 90 TABLET | Refills: 0 | Status: CANCELLED | OUTPATIENT
Start: 2021-04-08

## 2021-04-08 RX ORDER — MODAFINIL 200 MG/1
200 TABLET ORAL 2 TIMES DAILY
Qty: 180 TABLET | Refills: 1 | Status: CANCELLED | OUTPATIENT
Start: 2021-04-08

## 2021-04-08 RX ADMIN — IOHEXOL 3 ML: 180 INJECTION INTRAVENOUS at 11:09

## 2021-04-08 RX ADMIN — DEXAMETHASONE SODIUM PHOSPHATE 20 MG: 10 INJECTION, SOLUTION INTRAMUSCULAR; INTRAVENOUS at 11:09

## 2021-04-08 ASSESSMENT — PAIN SCALES - GENERAL: PAINLEVEL_OUTOF10: 5

## 2021-04-09 ENCOUNTER — TELEPHONE (OUTPATIENT)
Dept: PULMONOLOGY | Age: 66
End: 2021-04-09

## 2021-04-09 DIAGNOSIS — Z72.820 SLEEP DEPRIVATION: ICD-10-CM

## 2021-04-09 RX ORDER — LEVONORGESTREL/ETHIN.ESTRADIOL 0.15-0.03
200 TABLET ORAL 2 TIMES DAILY
Qty: 180 TABLET | Refills: 1 | Status: SHIPPED | OUTPATIENT
Start: 2021-04-09 | End: 2021-04-14

## 2021-04-09 RX ORDER — GABAPENTIN 300 MG/1
300 CAPSULE ORAL DAILY
Qty: 30 CAPSULE | Refills: 2 | Status: CANCELLED | OUTPATIENT
Start: 2021-04-09

## 2021-04-09 RX ORDER — TRAZODONE HYDROCHLORIDE 100 MG/1
200 TABLET ORAL NIGHTLY
Qty: 180 TABLET | Refills: 1 | Status: CANCELLED | OUTPATIENT
Start: 2021-04-09

## 2021-04-12 ENCOUNTER — TELEPHONE (OUTPATIENT)
Dept: BEHAVIORAL HEALTH | Age: 66
End: 2021-04-12

## 2021-04-12 ENCOUNTER — TELEPHONE (OUTPATIENT)
Dept: PULMONOLOGY | Age: 66
End: 2021-04-12

## 2021-04-12 ENCOUNTER — V-VISIT (OUTPATIENT)
Dept: FAMILY MEDICINE | Age: 66
End: 2021-04-12

## 2021-04-12 DIAGNOSIS — F31.12 BIPOLAR AFFECTIVE DISORDER, CURRENTLY MANIC, MODERATE (CMD): Primary | ICD-10-CM

## 2021-04-12 DIAGNOSIS — F31.12 BIPOLAR AFFECTIVE DISORDER, CURRENTLY MANIC, MODERATE (CMD): ICD-10-CM

## 2021-04-12 DIAGNOSIS — F41.0 PANIC DISORDER: Primary | ICD-10-CM

## 2021-04-12 DIAGNOSIS — G47.00 INSOMNIA, UNSPECIFIED TYPE: Primary | ICD-10-CM

## 2021-04-12 DIAGNOSIS — Z78.0 POST-MENOPAUSAL: ICD-10-CM

## 2021-04-12 DIAGNOSIS — F41.1 GENERALIZED ANXIETY DISORDER: ICD-10-CM

## 2021-04-12 DIAGNOSIS — Z12.31 VISIT FOR SCREENING MAMMOGRAM: ICD-10-CM

## 2021-04-12 DIAGNOSIS — K21.9 GASTROESOPHAGEAL REFLUX DISEASE, UNSPECIFIED WHETHER ESOPHAGITIS PRESENT: Primary | ICD-10-CM

## 2021-04-12 PROCEDURE — 99214 OFFICE O/P EST MOD 30 MIN: CPT | Performed by: FAMILY MEDICINE

## 2021-04-12 RX ORDER — BUPROPION HYDROCHLORIDE 300 MG/1
300 TABLET ORAL DAILY
Qty: 90 TABLET | Refills: 0 | Status: SHIPPED | OUTPATIENT
Start: 2021-04-12 | End: 2021-08-18 | Stop reason: SDUPTHER

## 2021-04-12 RX ORDER — DULOXETIN HYDROCHLORIDE 60 MG/1
60 CAPSULE, DELAYED RELEASE ORAL 2 TIMES DAILY
Qty: 180 CAPSULE | Refills: 0 | Status: SHIPPED | OUTPATIENT
Start: 2021-04-12 | End: 2021-08-18 | Stop reason: SDUPTHER

## 2021-04-12 RX ORDER — DULOXETIN HYDROCHLORIDE 60 MG/1
60 CAPSULE, DELAYED RELEASE ORAL DAILY
Qty: 180 CAPSULE | Refills: 0 | Status: SHIPPED | OUTPATIENT
Start: 2021-04-12 | End: 2021-04-12 | Stop reason: SDUPTHER

## 2021-04-12 RX ORDER — LAMOTRIGINE 25 MG/1
TABLET ORAL
Qty: 60 TABLET | Refills: 3 | Status: SHIPPED | OUTPATIENT
Start: 2021-04-12 | End: 2022-08-23 | Stop reason: ALTCHOICE

## 2021-04-12 RX ORDER — LEVOTHYROXINE SODIUM 0.05 MG/1
TABLET ORAL
Qty: 90 TABLET | Refills: 0 | Status: SHIPPED | OUTPATIENT
Start: 2021-04-12 | End: 2021-07-12

## 2021-04-12 RX ORDER — DULOXETIN HYDROCHLORIDE 60 MG/1
60 CAPSULE, DELAYED RELEASE ORAL 2 TIMES DAILY
Qty: 180 CAPSULE | Refills: 0 | Status: SHIPPED | OUTPATIENT
Start: 2021-04-12 | End: 2021-04-12 | Stop reason: ALTCHOICE

## 2021-04-12 RX ORDER — PANTOPRAZOLE SODIUM 40 MG/1
40 TABLET, DELAYED RELEASE ORAL DAILY
Qty: 90 TABLET | Refills: 3 | Status: SHIPPED | OUTPATIENT
Start: 2021-04-12 | End: 2021-09-08 | Stop reason: SDUPTHER

## 2021-04-12 RX ORDER — SERTRALINE HCL 50 MG
50 TABLET ORAL DAILY
Qty: 90 TABLET | Refills: 0 | Status: SHIPPED | OUTPATIENT
Start: 2021-04-12 | End: 2021-04-16 | Stop reason: SDUPTHER

## 2021-04-13 ENCOUNTER — TELEPHONE (OUTPATIENT)
Dept: BEHAVIORAL HEALTH | Age: 66
End: 2021-04-13

## 2021-04-14 RX ORDER — LEVONORGESTREL/ETHIN.ESTRADIOL 0.15-0.03
200 TABLET ORAL 2 TIMES DAILY
Qty: 180 TABLET | Refills: 1 | Status: SHIPPED | OUTPATIENT
Start: 2021-04-14 | End: 2021-07-13 | Stop reason: SDUPTHER

## 2021-04-15 ENCOUNTER — TELEPHONE (OUTPATIENT)
Dept: SCHEDULING | Age: 66
End: 2021-04-15

## 2021-04-16 ENCOUNTER — TELEPHONE (OUTPATIENT)
Dept: BEHAVIORAL HEALTH | Age: 66
End: 2021-04-16

## 2021-04-16 RX ORDER — SERTRALINE HCL 50 MG
50 TABLET ORAL DAILY
Qty: 90 TABLET | Refills: 0 | Status: SHIPPED | OUTPATIENT
Start: 2021-04-16 | End: 2021-04-26

## 2021-04-24 DIAGNOSIS — M79.604 BILATERAL LEG PAIN: ICD-10-CM

## 2021-04-24 DIAGNOSIS — M79.605 BILATERAL LEG PAIN: ICD-10-CM

## 2021-04-26 RX ORDER — GABAPENTIN 300 MG/1
CAPSULE ORAL
Qty: 30 CAPSULE | Refills: 1 | Status: SHIPPED | OUTPATIENT
Start: 2021-04-26 | End: 2021-07-06

## 2021-05-03 ENCOUNTER — CASE MANAGEMENT (OUTPATIENT)
Dept: CARE COORDINATION | Age: 66
End: 2021-05-03

## 2021-05-06 ENCOUNTER — HOSPITAL ENCOUNTER (OUTPATIENT)
Dept: MAMMOGRAPHY | Age: 66
Discharge: HOME | End: 2021-05-06
Attending: FAMILY MEDICINE

## 2021-05-06 DIAGNOSIS — Z12.31 VISIT FOR SCREENING MAMMOGRAM: ICD-10-CM

## 2021-05-06 DIAGNOSIS — Z78.0 POST-MENOPAUSAL: ICD-10-CM

## 2021-05-06 PROCEDURE — 77080 DXA BONE DENSITY AXIAL: CPT | Performed by: RADIOLOGY

## 2021-05-06 PROCEDURE — 77080 DXA BONE DENSITY AXIAL: CPT

## 2021-05-06 PROCEDURE — 77067 SCR MAMMO BI INCL CAD: CPT | Performed by: RADIOLOGY

## 2021-05-06 PROCEDURE — 77063 BREAST TOMOSYNTHESIS BI: CPT | Performed by: RADIOLOGY

## 2021-05-06 PROCEDURE — 77063 BREAST TOMOSYNTHESIS BI: CPT

## 2021-05-10 ENCOUNTER — HOSPITAL ENCOUNTER (OUTPATIENT)
Dept: GENERAL RADIOLOGY | Age: 66
Discharge: HOME | End: 2021-05-10
Attending: PAIN MEDICINE

## 2021-05-10 ENCOUNTER — HOSPITAL ENCOUNTER (OUTPATIENT)
Dept: PAIN MANAGEMENT | Age: 66
Discharge: HOME | End: 2021-05-10
Attending: PAIN MEDICINE

## 2021-05-10 VITALS
HEART RATE: 80 BPM | OXYGEN SATURATION: 99 % | SYSTOLIC BLOOD PRESSURE: 126 MMHG | RESPIRATION RATE: 16 BRPM | TEMPERATURE: 97.7 F | DIASTOLIC BLOOD PRESSURE: 62 MMHG

## 2021-05-10 DIAGNOSIS — M50.320 DEGENERATION OF INTERVERTEBRAL DISC OF MID-CERVICAL REGION, UNSPECIFIED SPINAL LEVEL: Primary | ICD-10-CM

## 2021-05-10 DIAGNOSIS — M54.12 CERVICAL RADICULOPATHY: ICD-10-CM

## 2021-05-10 PROCEDURE — 10005281 FL PACS RECORD NR

## 2021-05-10 PROCEDURE — 10000258 HB EPIDURAL INJ 1ST LEVEL W/ IMAGING

## 2021-05-10 PROCEDURE — 10002800 HB RX 250 W HCPCS: Performed by: PAIN MEDICINE

## 2021-05-10 PROCEDURE — 10002805 HB CONTRAST AGENT: Performed by: PAIN MEDICINE

## 2021-05-10 PROCEDURE — 10004187 HB COUNTER-VISIT CENSUS, OUTPATIENT

## 2021-05-10 RX ORDER — DEXAMETHASONE SODIUM PHOSPHATE 10 MG/ML
20 INJECTION, SOLUTION INTRAMUSCULAR; INTRAVENOUS ONCE
Status: COMPLETED | OUTPATIENT
Start: 2021-05-10 | End: 2021-05-10

## 2021-05-10 RX ADMIN — DEXAMETHASONE SODIUM PHOSPHATE 20 MG: 10 INJECTION, SOLUTION INTRAMUSCULAR; INTRAVENOUS at 11:10

## 2021-05-10 RX ADMIN — IOHEXOL 3 ML: 180 INJECTION INTRAVENOUS at 11:10

## 2021-05-10 ASSESSMENT — PAIN SCALES - GENERAL: PAINLEVEL_OUTOF10: 5

## 2021-05-11 ENCOUNTER — TELEPHONE (OUTPATIENT)
Dept: FAMILY MEDICINE | Age: 66
End: 2021-05-11

## 2021-05-11 ENCOUNTER — OFFICE VISIT (OUTPATIENT)
Dept: ORTHOPEDICS | Age: 66
End: 2021-05-11

## 2021-05-11 ENCOUNTER — IMAGING SERVICES (OUTPATIENT)
Dept: GENERAL RADIOLOGY | Age: 66
End: 2021-05-11
Attending: ORTHOPAEDIC SURGERY

## 2021-05-11 VITALS — BODY MASS INDEX: 22.08 KG/M2 | WEIGHT: 132.7 LBS

## 2021-05-11 DIAGNOSIS — E65 PANNUS, ABDOMINAL: ICD-10-CM

## 2021-05-11 DIAGNOSIS — L98.7 LOOSE SKIN: ICD-10-CM

## 2021-05-11 DIAGNOSIS — Z96.649 S/P REVISION OF TOTAL HIP: Primary | ICD-10-CM

## 2021-05-11 DIAGNOSIS — Z98.84 H/O GASTRIC BYPASS: ICD-10-CM

## 2021-05-11 DIAGNOSIS — Z96.649 S/P REVISION OF TOTAL HIP: ICD-10-CM

## 2021-05-11 PROCEDURE — 99024 POSTOP FOLLOW-UP VISIT: CPT | Performed by: ORTHOPAEDIC SURGERY

## 2021-05-11 PROCEDURE — 73502 X-RAY EXAM HIP UNI 2-3 VIEWS: CPT | Performed by: RADIOLOGY

## 2021-05-12 RX ORDER — ALENDRONATE SODIUM 10 MG/1
10 TABLET ORAL
Qty: 90 TABLET | Refills: 3 | Status: SHIPPED | OUTPATIENT
Start: 2021-05-12 | End: 2022-11-16 | Stop reason: SDUPTHER

## 2021-05-14 ENCOUNTER — CASE MANAGEMENT (OUTPATIENT)
Dept: CARE COORDINATION | Age: 66
End: 2021-05-14

## 2021-05-19 ENCOUNTER — TELEPHONIC VISIT (OUTPATIENT)
Dept: PULMONOLOGY | Age: 66
End: 2021-05-19
Attending: NURSE PRACTITIONER

## 2021-05-19 DIAGNOSIS — G47.10 HYPERSOMNOLENCE: ICD-10-CM

## 2021-05-19 DIAGNOSIS — Z87.891 HISTORY OF TOBACCO USE: Primary | ICD-10-CM

## 2021-05-21 ENCOUNTER — OFFICE VISIT (OUTPATIENT)
Dept: REHABILITATION | Age: 66
End: 2021-05-21
Attending: ORTHOPAEDIC SURGERY

## 2021-05-21 DIAGNOSIS — Z96.649 S/P REVISION OF TOTAL HIP: Primary | ICD-10-CM

## 2021-05-21 PROCEDURE — 97162 PT EVAL MOD COMPLEX 30 MIN: CPT | Performed by: PHYSICAL THERAPIST

## 2021-05-21 PROCEDURE — 97110 THERAPEUTIC EXERCISES: CPT | Performed by: PHYSICAL THERAPIST

## 2021-05-21 ASSESSMENT — HOOS JR
HOOS JR RAW SCORE: 14
WALKING ON UNEVEN SURFACE: MODERATE
HOOS JR CALCULATED SCORE: 46.65
LYING IN BED (TURNING OVER, MAINTAINING HIP POSITION): MODERATE
SITTING: SEVERE
BENDING TO THE FLOOR TO PICK UP OBJECT: SEVERE
GOING UP OR DOWN STAIRS: MODERATE
RISING FROM SITTING: MODERATE

## 2021-05-21 ASSESSMENT — ENCOUNTER SYMPTOMS
PAIN SCALE AT LOWEST: 4
SUBJECTIVE PAIN PROGRESSION: IMPROVED
PAIN SCALE AT HIGHEST: 10
PAIN SEVERITY NOW: 4
PAIN FREQUENCY: CONSTANT

## 2021-06-02 ENCOUNTER — OFFICE VISIT (OUTPATIENT)
Dept: REHABILITATION | Age: 66
End: 2021-06-02

## 2021-06-02 DIAGNOSIS — Z96.649 S/P REVISION OF TOTAL HIP: ICD-10-CM

## 2021-06-02 PROCEDURE — 97140 MANUAL THERAPY 1/> REGIONS: CPT | Performed by: PHYSICAL THERAPIST

## 2021-06-02 PROCEDURE — 97110 THERAPEUTIC EXERCISES: CPT | Performed by: PHYSICAL THERAPIST

## 2021-06-02 ASSESSMENT — ENCOUNTER SYMPTOMS: PAIN SEVERITY NOW: 5

## 2021-06-03 ENCOUNTER — CASE MANAGEMENT (OUTPATIENT)
Dept: CARE COORDINATION | Age: 66
End: 2021-06-03

## 2021-06-08 ENCOUNTER — OFFICE VISIT (OUTPATIENT)
Dept: REHABILITATION | Age: 66
End: 2021-06-08

## 2021-06-08 DIAGNOSIS — Z96.649 S/P REVISION OF TOTAL HIP: ICD-10-CM

## 2021-06-08 PROCEDURE — 97140 MANUAL THERAPY 1/> REGIONS: CPT | Performed by: PHYSICAL THERAPY ASSISTANT

## 2021-06-08 PROCEDURE — 97110 THERAPEUTIC EXERCISES: CPT | Performed by: PHYSICAL THERAPY ASSISTANT

## 2021-06-08 ASSESSMENT — ENCOUNTER SYMPTOMS
PAIN SEVERITY NOW: 4
PAIN SCALE AT HIGHEST: 0

## 2021-06-10 ENCOUNTER — OFFICE VISIT (OUTPATIENT)
Dept: REHABILITATION | Age: 66
End: 2021-06-10

## 2021-06-10 DIAGNOSIS — Z96.649 S/P REVISION OF TOTAL HIP: ICD-10-CM

## 2021-06-10 PROCEDURE — 97140 MANUAL THERAPY 1/> REGIONS: CPT | Performed by: PHYSICAL THERAPY ASSISTANT

## 2021-06-10 PROCEDURE — 97110 THERAPEUTIC EXERCISES: CPT | Performed by: PHYSICAL THERAPY ASSISTANT

## 2021-06-10 ASSESSMENT — ENCOUNTER SYMPTOMS: PAIN SEVERITY NOW: 4

## 2021-06-15 ENCOUNTER — OFFICE VISIT (OUTPATIENT)
Dept: REHABILITATION | Age: 66
End: 2021-06-15

## 2021-06-15 DIAGNOSIS — Z96.649 S/P REVISION OF TOTAL HIP: ICD-10-CM

## 2021-06-15 PROCEDURE — 97110 THERAPEUTIC EXERCISES: CPT | Performed by: PHYSICAL THERAPY ASSISTANT

## 2021-06-15 PROCEDURE — 97140 MANUAL THERAPY 1/> REGIONS: CPT | Performed by: PHYSICAL THERAPY ASSISTANT

## 2021-06-17 ENCOUNTER — OFFICE VISIT (OUTPATIENT)
Dept: REHABILITATION | Age: 66
End: 2021-06-17

## 2021-06-17 DIAGNOSIS — Z96.649 S/P REVISION OF TOTAL HIP: ICD-10-CM

## 2021-06-17 PROCEDURE — 97110 THERAPEUTIC EXERCISES: CPT | Performed by: PHYSICAL THERAPY ASSISTANT

## 2021-06-22 ENCOUNTER — OFFICE VISIT (OUTPATIENT)
Dept: REHABILITATION | Age: 66
End: 2021-06-22

## 2021-06-22 ENCOUNTER — TELEPHONE (OUTPATIENT)
Dept: FAMILY MEDICINE | Age: 66
End: 2021-06-22

## 2021-06-22 DIAGNOSIS — M79.89 LEG SWELLING: ICD-10-CM

## 2021-06-22 DIAGNOSIS — Z96.649 S/P REVISION OF TOTAL HIP: ICD-10-CM

## 2021-06-22 PROCEDURE — 97110 THERAPEUTIC EXERCISES: CPT | Performed by: PHYSICAL THERAPY ASSISTANT

## 2021-06-23 RX ORDER — FUROSEMIDE 40 MG/1
40 TABLET ORAL 2 TIMES DAILY
Qty: 180 TABLET | Refills: 1 | Status: SHIPPED | OUTPATIENT
Start: 2021-06-23 | End: 2021-12-09

## 2021-06-23 ASSESSMENT — ENCOUNTER SYMPTOMS
PAIN SCALE AT LOWEST: 2
PAIN SEVERITY NOW: 2
PAIN SCALE AT HIGHEST: 6

## 2021-06-24 ENCOUNTER — OFFICE VISIT (OUTPATIENT)
Dept: REHABILITATION | Age: 66
End: 2021-06-24

## 2021-06-24 DIAGNOSIS — Z96.649 S/P REVISION OF TOTAL HIP: ICD-10-CM

## 2021-06-24 PROCEDURE — 97110 THERAPEUTIC EXERCISES: CPT | Performed by: PHYSICAL THERAPIST

## 2021-06-24 PROCEDURE — 97140 MANUAL THERAPY 1/> REGIONS: CPT | Performed by: PHYSICAL THERAPIST

## 2021-06-24 ASSESSMENT — ENCOUNTER SYMPTOMS
PAIN SEVERITY NOW: 5
PAIN SCALE AT HIGHEST: 7
PAIN SCALE AT LOWEST: 3

## 2021-06-29 ENCOUNTER — OFFICE VISIT (OUTPATIENT)
Dept: REHABILITATION | Age: 66
End: 2021-06-29

## 2021-06-29 DIAGNOSIS — Z96.649 S/P REVISION OF TOTAL HIP: ICD-10-CM

## 2021-06-29 PROCEDURE — 97110 THERAPEUTIC EXERCISES: CPT | Performed by: PHYSICAL THERAPY ASSISTANT

## 2021-06-29 PROCEDURE — 97140 MANUAL THERAPY 1/> REGIONS: CPT | Performed by: PHYSICAL THERAPY ASSISTANT

## 2021-06-29 ASSESSMENT — ENCOUNTER SYMPTOMS
PAIN SEVERITY NOW: 3
PAIN SCALE AT HIGHEST: 10
PAIN SCALE AT LOWEST: 0

## 2021-06-30 ENCOUNTER — CASE MANAGEMENT (OUTPATIENT)
Dept: CARE COORDINATION | Age: 66
End: 2021-06-30

## 2021-06-30 ASSESSMENT — PATIENT HEALTH QUESTIONNAIRE - PHQ9
SUM OF ALL RESPONSES TO PHQ9 QUESTIONS 1 AND 2: 0
CLINICAL INTERPRETATION OF PHQ9 SCORE: NO FURTHER SCREENING NEEDED
2. FEELING DOWN, DEPRESSED OR HOPELESS: NOT AT ALL
1. LITTLE INTEREST OR PLEASURE IN DOING THINGS: NOT AT ALL
CLINICAL INTERPRETATION OF PHQ2 SCORE: NO FURTHER SCREENING NEEDED
SUM OF ALL RESPONSES TO PHQ9 QUESTIONS 1 AND 2: 0

## 2021-07-01 ENCOUNTER — OFFICE VISIT (OUTPATIENT)
Dept: REHABILITATION | Age: 66
End: 2021-07-01

## 2021-07-01 DIAGNOSIS — Z96.649 S/P REVISION OF TOTAL HIP: ICD-10-CM

## 2021-07-01 PROCEDURE — 97140 MANUAL THERAPY 1/> REGIONS: CPT | Performed by: PHYSICAL THERAPIST

## 2021-07-01 PROCEDURE — 97110 THERAPEUTIC EXERCISES: CPT | Performed by: PHYSICAL THERAPIST

## 2021-07-01 ASSESSMENT — HOOS JR
HOOS JR RAW SCORE: 11
GOING UP OR DOWN STAIRS: MODERATE
LYING IN BED (TURNING OVER, MAINTAINING HIP POSITION): MILD
HOOS JR CALCULATED SCORE: 55.99
SITTING: MILD
BENDING TO THE FLOOR TO PICK UP OBJECT: EXTREME
RISING FROM SITTING: MODERATE
WALKING ON UNEVEN SURFACE: MILD

## 2021-07-01 ASSESSMENT — ENCOUNTER SYMPTOMS
PAIN SCALE AT LOWEST: 0
PAIN SEVERITY NOW: 5

## 2021-07-05 DIAGNOSIS — M79.604 BILATERAL LEG PAIN: ICD-10-CM

## 2021-07-05 DIAGNOSIS — M79.605 BILATERAL LEG PAIN: ICD-10-CM

## 2021-07-06 RX ORDER — GABAPENTIN 300 MG/1
CAPSULE ORAL
Qty: 30 CAPSULE | Refills: 0 | Status: SHIPPED | OUTPATIENT
Start: 2021-07-06 | End: 2021-08-04

## 2021-07-09 ENCOUNTER — APPOINTMENT (OUTPATIENT)
Dept: REHABILITATION | Age: 66
End: 2021-07-09

## 2021-07-12 RX ORDER — LEVOTHYROXINE SODIUM 0.05 MG/1
TABLET ORAL
Qty: 90 TABLET | Refills: 0 | Status: SHIPPED | OUTPATIENT
Start: 2021-07-12 | End: 2021-10-07

## 2021-07-14 RX ORDER — MODAFINIL 200 MG/1
200 TABLET ORAL 2 TIMES DAILY
Qty: 180 TABLET | Refills: 1 | Status: SHIPPED | OUTPATIENT
Start: 2021-07-14 | End: 2021-12-08 | Stop reason: SDUPTHER

## 2021-07-22 ENCOUNTER — OFFICE VISIT (OUTPATIENT)
Dept: REHABILITATION | Age: 66
End: 2021-07-22

## 2021-07-22 DIAGNOSIS — Z96.649 S/P REVISION OF TOTAL HIP: ICD-10-CM

## 2021-07-22 PROCEDURE — 97530 THERAPEUTIC ACTIVITIES: CPT | Performed by: PHYSICAL THERAPIST

## 2021-07-22 PROCEDURE — 97116 GAIT TRAINING THERAPY: CPT | Performed by: PHYSICAL THERAPIST

## 2021-07-22 PROCEDURE — 97110 THERAPEUTIC EXERCISES: CPT | Performed by: PHYSICAL THERAPIST

## 2021-07-22 ASSESSMENT — ENCOUNTER SYMPTOMS
PAIN SCALE AT LOWEST: 0
PAIN SEVERITY NOW: 3

## 2021-07-29 ENCOUNTER — OFFICE VISIT (OUTPATIENT)
Dept: REHABILITATION | Age: 66
End: 2021-07-29

## 2021-07-29 DIAGNOSIS — Z96.649 S/P REVISION OF TOTAL HIP: ICD-10-CM

## 2021-07-29 PROCEDURE — 97140 MANUAL THERAPY 1/> REGIONS: CPT | Performed by: PHYSICAL THERAPIST

## 2021-07-29 PROCEDURE — 97110 THERAPEUTIC EXERCISES: CPT | Performed by: PHYSICAL THERAPIST

## 2021-07-29 ASSESSMENT — ENCOUNTER SYMPTOMS
PAIN SCALE AT LOWEST: 0
PAIN SEVERITY NOW: 7
PAIN SCALE AT HIGHEST: 7
PAIN DESCRIPTION: PINS AND NEEDLES

## 2021-07-30 ENCOUNTER — CASE MANAGEMENT (OUTPATIENT)
Dept: CARE COORDINATION | Age: 66
End: 2021-07-30

## 2021-07-30 DIAGNOSIS — F41.1 GENERALIZED ANXIETY DISORDER: ICD-10-CM

## 2021-07-30 DIAGNOSIS — F31.12 BIPOLAR AFFECTIVE DISORDER, CURRENTLY MANIC, MODERATE (CMD): ICD-10-CM

## 2021-08-04 DIAGNOSIS — M79.604 BILATERAL LEG PAIN: ICD-10-CM

## 2021-08-04 DIAGNOSIS — M79.605 BILATERAL LEG PAIN: ICD-10-CM

## 2021-08-04 RX ORDER — GABAPENTIN 300 MG/1
CAPSULE ORAL
Qty: 30 CAPSULE | Refills: 0 | Status: SHIPPED | OUTPATIENT
Start: 2021-08-04 | End: 2021-09-03

## 2021-08-04 RX ORDER — LAMOTRIGINE 25 MG/1
TABLET ORAL
Qty: 60 TABLET | Refills: 0 | Status: SHIPPED | OUTPATIENT
Start: 2021-08-04 | End: 2021-08-18 | Stop reason: DRUGHIGH

## 2021-08-05 ENCOUNTER — APPOINTMENT (OUTPATIENT)
Dept: REHABILITATION | Age: 66
End: 2021-08-05

## 2021-08-05 ENCOUNTER — TELEPHONE (OUTPATIENT)
Dept: REHABILITATION | Age: 66
End: 2021-08-05

## 2021-08-09 ENCOUNTER — OFFICE VISIT (OUTPATIENT)
Dept: ORTHOPEDICS | Age: 66
End: 2021-08-09

## 2021-08-09 ENCOUNTER — IMAGING SERVICES (OUTPATIENT)
Dept: GENERAL RADIOLOGY | Age: 66
End: 2021-08-09
Attending: ORTHOPAEDIC SURGERY

## 2021-08-09 DIAGNOSIS — Z96.642 HISTORY OF REVISION OF TOTAL REPLACEMENT OF LEFT HIP JOINT: ICD-10-CM

## 2021-08-09 DIAGNOSIS — Z96.642 HISTORY OF REVISION OF TOTAL REPLACEMENT OF LEFT HIP JOINT: Primary | ICD-10-CM

## 2021-08-09 PROCEDURE — 99213 OFFICE O/P EST LOW 20 MIN: CPT | Performed by: ORTHOPAEDIC SURGERY

## 2021-08-09 PROCEDURE — 73502 X-RAY EXAM HIP UNI 2-3 VIEWS: CPT | Performed by: RADIOLOGY

## 2021-08-12 ENCOUNTER — OFFICE VISIT (OUTPATIENT)
Dept: REHABILITATION | Age: 66
End: 2021-08-12

## 2021-08-12 DIAGNOSIS — Z96.649 S/P REVISION OF TOTAL HIP: ICD-10-CM

## 2021-08-12 PROCEDURE — 97110 THERAPEUTIC EXERCISES: CPT | Performed by: PHYSICAL THERAPY ASSISTANT

## 2021-08-12 ASSESSMENT — ENCOUNTER SYMPTOMS
PAIN SEVERITY NOW: 5
PAIN SCALE AT LOWEST: 4
PAIN SCALE AT HIGHEST: 9
SUBJECTIVE PAIN PROGRESSION: IMPROVED

## 2021-08-18 ENCOUNTER — TELEPHONE (OUTPATIENT)
Dept: FAMILY MEDICINE | Age: 66
End: 2021-08-18

## 2021-08-18 ENCOUNTER — V-VISIT (OUTPATIENT)
Dept: BEHAVIORAL HEALTH | Age: 66
End: 2021-08-18
Attending: FAMILY MEDICINE

## 2021-08-18 DIAGNOSIS — F31.12 BIPOLAR AFFECTIVE DISORDER, CURRENTLY MANIC, MODERATE (CMD): Primary | ICD-10-CM

## 2021-08-18 DIAGNOSIS — Z86.39 HISTORY OF HYPOKALEMIA: ICD-10-CM

## 2021-08-18 DIAGNOSIS — F41.1 GENERALIZED ANXIETY DISORDER: ICD-10-CM

## 2021-08-18 PROCEDURE — 99214 OFFICE O/P EST MOD 30 MIN: CPT | Performed by: NURSE PRACTITIONER

## 2021-08-18 RX ORDER — LAMOTRIGINE 25 MG/1
50 TABLET ORAL DAILY
Qty: 180 TABLET | Refills: 0 | Status: SHIPPED | OUTPATIENT
Start: 2021-08-18 | End: 2021-12-15

## 2021-08-18 RX ORDER — DULOXETIN HYDROCHLORIDE 60 MG/1
60 CAPSULE, DELAYED RELEASE ORAL 2 TIMES DAILY
Qty: 180 CAPSULE | Refills: 0 | Status: SHIPPED | OUTPATIENT
Start: 2021-08-18 | End: 2021-12-15

## 2021-08-18 RX ORDER — IBUPROFEN 800 MG/1
800 TABLET ORAL EVERY 6 HOURS PRN
Qty: 60 TABLET | Refills: 1 | Status: SHIPPED | OUTPATIENT
Start: 2021-08-18 | End: 2021-08-19 | Stop reason: SDUPTHER

## 2021-08-18 RX ORDER — POTASSIUM CHLORIDE 1500 MG/1
20 TABLET, EXTENDED RELEASE ORAL DAILY
Qty: 90 TABLET | Refills: 1 | Status: SHIPPED | OUTPATIENT
Start: 2021-08-18 | End: 2021-08-19 | Stop reason: SDUPTHER

## 2021-08-18 RX ORDER — BUPROPION HYDROCHLORIDE 300 MG/1
300 TABLET ORAL DAILY
Qty: 90 TABLET | Refills: 0 | Status: SHIPPED | OUTPATIENT
Start: 2021-08-18 | End: 2022-01-27 | Stop reason: SDUPTHER

## 2021-08-19 ENCOUNTER — OFFICE VISIT (OUTPATIENT)
Dept: REHABILITATION | Age: 66
End: 2021-08-19

## 2021-08-19 ENCOUNTER — TELEPHONE (OUTPATIENT)
Dept: BEHAVIORAL HEALTH | Age: 66
End: 2021-08-19

## 2021-08-19 DIAGNOSIS — M79.604 BILATERAL LEG PAIN: ICD-10-CM

## 2021-08-19 DIAGNOSIS — Z86.39 HISTORY OF HYPOKALEMIA: ICD-10-CM

## 2021-08-19 DIAGNOSIS — M79.605 BILATERAL LEG PAIN: ICD-10-CM

## 2021-08-19 DIAGNOSIS — Z96.649 S/P REVISION OF TOTAL HIP: ICD-10-CM

## 2021-08-19 PROCEDURE — 97110 THERAPEUTIC EXERCISES: CPT | Performed by: PHYSICAL THERAPIST

## 2021-08-19 PROCEDURE — 97530 THERAPEUTIC ACTIVITIES: CPT | Performed by: PHYSICAL THERAPIST

## 2021-08-19 ASSESSMENT — MOVEMENT AND STRENGTH ASSESSMENTS
WALKING 2 BLOCKS: QUITE A BIT OF DIFFICULTY
RUNNING ON UNEVEN GROUND: EXTREME DIFFICULTY OR UNABLE TO PERFORM ACTIVITY
ROLLING OVER IN BED: A LITTLE BIT OF DIFFICULTY
SITTING FOR 1 HOUR: EXTREME DIFFICULTY OR UNABLE TO PERFORM ACTIVITY
PUTTING ON YOUR SHOES OR SOCKS: MODERATE DIFFICULTY
LIFTING AN OBJECT, LIKE A BAG OF GROCERIES, FROM THE FLOOR: EXTREME DIFFICULTY OR UNABLE TO PERFORM ACTIVITY
TOTAL SCORE: 21.25
GETTING INTO OR OUT OF THE BATH: MODERATE DIFFICULTY
MAKING SHARP TURNS WHILE RUNNING FAST: EXTREME DIFFICULTY OR UNABLE TO PERFORM ACTIVITY
GOING UP OR DOWN 10 STAIRS (ABOUT 1 FLIGHT OF STAIRS): EXTREME DIFFICULTY OR UNABLE TO PERFORM ACTIVITY
RUNNING ON EVEN GROUND: EXTREME DIFFICULTY OR UNABLE TO PERFORM ACTIVITY
SQUATTING: EXTREME DIFFICULTY OR UNABLE TO PERFORM ACTIVITY
WALKING A MILE: EXTREME DIFFICULTY OR UNABLE TO PERFORM ACTIVITY
PERFORMING HEAVY ACTIVITIES AROUND YOUR HOME: EXTREME DIFFICULTY OR UNABLE TO PERFORM ACTIVITY
ANY OF YOUR USUAL WORK, HOUSEWORK OR SCHOOL ACTIVITIES: QUITE A BIT OF DIFFICULTY
HOPPING: EXTREME DIFFICULTY OR UNABLE TO PERFORM ACTIVITY
WALKING BETWEEN ROOMS: A LITTLE BIT OF DIFFICULTY
GETTING INTO OR OUT OF A CAR: QUITE A BIT OF DIFFICULTY
YOUR USUAL HOBBIES, RECREATIONAL OR SPORTING ACTIVIITIES: MODERATE DIFFICULTY
STANDING FOR 1 HOUR: EXTREME DIFFICULTY OR UNABLE TO PERFORM ACTIVITY
PERFORMING LIGHT ACTIVITES AROUND YOUR HOME: MODERATE DIFFICULTY

## 2021-08-20 RX ORDER — GABAPENTIN 300 MG/1
300 CAPSULE ORAL DAILY
Qty: 30 CAPSULE | Refills: 0 | OUTPATIENT
Start: 2021-08-20

## 2021-08-20 RX ORDER — IBUPROFEN 800 MG/1
800 TABLET ORAL EVERY 6 HOURS PRN
Qty: 60 TABLET | Refills: 1 | Status: SHIPPED | OUTPATIENT
Start: 2021-08-20 | End: 2021-12-09

## 2021-08-20 RX ORDER — POTASSIUM CHLORIDE 1500 MG/1
20 TABLET, EXTENDED RELEASE ORAL DAILY
Qty: 90 TABLET | Refills: 1 | Status: SHIPPED | OUTPATIENT
Start: 2021-08-20 | End: 2021-12-09

## 2021-09-01 DIAGNOSIS — M79.605 BILATERAL LEG PAIN: ICD-10-CM

## 2021-09-01 DIAGNOSIS — M79.604 BILATERAL LEG PAIN: ICD-10-CM

## 2021-09-03 RX ORDER — GABAPENTIN 300 MG/1
CAPSULE ORAL
Qty: 30 CAPSULE | Refills: 0 | Status: SHIPPED | OUTPATIENT
Start: 2021-09-03 | End: 2021-10-05

## 2021-09-08 ENCOUNTER — OFFICE VISIT (OUTPATIENT)
Dept: FAMILY MEDICINE | Age: 66
End: 2021-09-08

## 2021-09-08 VITALS
DIASTOLIC BLOOD PRESSURE: 64 MMHG | WEIGHT: 143.2 LBS | SYSTOLIC BLOOD PRESSURE: 105 MMHG | HEART RATE: 78 BPM | BODY MASS INDEX: 23.86 KG/M2 | OXYGEN SATURATION: 97 % | RESPIRATION RATE: 16 BRPM | HEIGHT: 65 IN

## 2021-09-08 DIAGNOSIS — Z00.00 MEDICARE ANNUAL WELLNESS VISIT, SUBSEQUENT: Primary | ICD-10-CM

## 2021-09-08 DIAGNOSIS — L98.9 SKIN LESIONS, GENERALIZED: ICD-10-CM

## 2021-09-08 DIAGNOSIS — Z23 NEED FOR VACCINATION: ICD-10-CM

## 2021-09-08 DIAGNOSIS — I50.32 CHRONIC DIASTOLIC HEART FAILURE (CMD): ICD-10-CM

## 2021-09-08 PROCEDURE — 99213 OFFICE O/P EST LOW 20 MIN: CPT | Performed by: FAMILY MEDICINE

## 2021-09-08 PROCEDURE — 90686 IIV4 VACC NO PRSV 0.5 ML IM: CPT | Performed by: FAMILY MEDICINE

## 2021-09-08 PROCEDURE — G0439 PPPS, SUBSEQ VISIT: HCPCS | Performed by: FAMILY MEDICINE

## 2021-09-08 PROCEDURE — G0008 ADMIN INFLUENZA VIRUS VAC: HCPCS | Performed by: FAMILY MEDICINE

## 2021-09-08 RX ORDER — ESOMEPRAZOLE MAGNESIUM 40 MG/1
CAPSULE, DELAYED RELEASE ORAL
COMMUNITY
Start: 2021-06-23 | End: 2021-09-13

## 2021-09-08 ASSESSMENT — PATIENT HEALTH QUESTIONNAIRE - PHQ9
2. FEELING DOWN, DEPRESSED OR HOPELESS: NEARLY EVERY DAY
CLINICAL INTERPRETATION OF PHQ9 SCORE: SEVERE DEPRESSION
6. FEELING BAD ABOUT YOURSELF - OR THAT YOU ARE A FAILURE OR HAVE LET YOURSELF OR YOUR FAMILY DOWN: NEARLY EVERY DAY
1. LITTLE INTEREST OR PLEASURE IN DOING THINGS: MORE THAN HALF THE DAYS
SUM OF ALL RESPONSES TO PHQ9 QUESTIONS 1 AND 2: 5
3. TROUBLE FALLING OR STAYING ASLEEP OR SLEEPING TOO MUCH: NEARLY EVERY DAY
5. POOR APPETITE, WEIGHT LOSS, OR OVEREATING: NEARLY EVERY DAY
CLINICAL INTERPRETATION OF PHQ2 SCORE: FURTHER SCREENING NEEDED
SUM OF ALL RESPONSES TO PHQ9 QUESTIONS 1 TO 9: 23
4. FEELING TIRED OR HAVING LITTLE ENERGY: NEARLY EVERY DAY
10. IF YOU CHECKED OFF ANY PROBLEMS, HOW DIFFICULT HAVE THESE PROBLEMS MADE IT FOR YOU TO DO YOUR WORK, TAKE CARE OF THINGS AT HOME, OR GET ALONG WITH OTHER PEOPLE: SOMEWHAT DIFFICULT
SUM OF ALL RESPONSES TO PHQ9 QUESTIONS 1 AND 2: 5
CLINICAL INTERPRETATION OF PHQ2 SCORE: SEVERE DEPRESSION
7. TROUBLE CONCENTRATING ON THINGS, SUCH AS READING THE NEWSPAPER OR WATCHING TELEVISION: NEARLY EVERY DAY
8. MOVING OR SPEAKING SO SLOWLY THAT OTHER PEOPLE COULD HAVE NOTICED. OR THE OPPOSITE, BEING SO FIGETY OR RESTLESS THAT YOU HAVE BEEN MOVING AROUND A LOT MORE THAN USUAL: NEARLY EVERY DAY
SUM OF ALL RESPONSES TO PHQ QUESTIONS 1-9: 23
9. THOUGHTS THAT YOU WOULD BE BETTER OFF DEAD, OR OF HURTING YOURSELF: NOT AT ALL
CLINICAL INTERPRETATION OF PHQ9 SCORE: FURTHER SCREENING NEEDED

## 2021-09-14 RX ORDER — ESOMEPRAZOLE MAGNESIUM 40 MG/1
CAPSULE, DELAYED RELEASE ORAL
Qty: 90 CAPSULE | Refills: 0 | Status: SHIPPED | OUTPATIENT
Start: 2021-09-14 | End: 2021-12-09

## 2021-10-05 DIAGNOSIS — M79.605 BILATERAL LEG PAIN: ICD-10-CM

## 2021-10-05 DIAGNOSIS — M79.604 BILATERAL LEG PAIN: ICD-10-CM

## 2021-10-05 RX ORDER — GABAPENTIN 300 MG/1
CAPSULE ORAL
Qty: 30 CAPSULE | Refills: 0 | Status: SHIPPED | OUTPATIENT
Start: 2021-10-05 | End: 2021-11-08

## 2021-10-07 RX ORDER — QUINAPRIL 20 MG/1
TABLET ORAL
Qty: 90 TABLET | Refills: 1 | Status: SHIPPED | OUTPATIENT
Start: 2021-10-07 | End: 2022-04-08 | Stop reason: SDUPTHER

## 2021-10-07 RX ORDER — LEVOTHYROXINE SODIUM 0.05 MG/1
TABLET ORAL
Qty: 90 TABLET | Refills: 0 | Status: SHIPPED | OUTPATIENT
Start: 2021-10-07 | End: 2021-12-20 | Stop reason: SDUPTHER

## 2021-10-08 ENCOUNTER — HOSPITAL ENCOUNTER (OUTPATIENT)
Dept: CT IMAGING | Age: 66
Discharge: HOME | End: 2021-10-08
Attending: NURSE PRACTITIONER

## 2021-10-08 DIAGNOSIS — Z87.891 HISTORY OF TOBACCO USE: ICD-10-CM

## 2021-10-08 PROCEDURE — 71271 CT THORAX LUNG CANCER SCR C-: CPT | Performed by: RADIOLOGY

## 2021-10-08 PROCEDURE — 71271 CT THORAX LUNG CANCER SCR C-: CPT

## 2021-10-11 ENCOUNTER — DOCUMENTATION (OUTPATIENT)
Dept: ONCOLOGY | Age: 66
End: 2021-10-11

## 2021-10-19 ENCOUNTER — APPOINTMENT (OUTPATIENT)
Dept: PULMONOLOGY | Age: 66
End: 2021-10-19
Attending: INTERNAL MEDICINE

## 2021-11-06 DIAGNOSIS — M79.605 BILATERAL LEG PAIN: ICD-10-CM

## 2021-11-06 DIAGNOSIS — M79.604 BILATERAL LEG PAIN: ICD-10-CM

## 2021-11-08 RX ORDER — GABAPENTIN 300 MG/1
CAPSULE ORAL
Qty: 30 CAPSULE | Refills: 3 | Status: SHIPPED | OUTPATIENT
Start: 2021-11-08 | End: 2022-03-08

## 2021-11-16 ENCOUNTER — APPOINTMENT (OUTPATIENT)
Dept: BEHAVIORAL HEALTH | Age: 66
End: 2021-11-16

## 2021-11-17 ENCOUNTER — TELEPHONE (OUTPATIENT)
Dept: BEHAVIORAL HEALTH | Age: 66
End: 2021-11-17

## 2021-11-17 DIAGNOSIS — F31.12 BIPOLAR AFFECTIVE DISORDER, CURRENTLY MANIC, MODERATE (CMD): ICD-10-CM

## 2021-11-17 RX ORDER — BUPROPION HYDROCHLORIDE 150 MG/1
150 TABLET ORAL DAILY
Qty: 30 TABLET | Refills: 1 | Status: SHIPPED | OUTPATIENT
Start: 2021-11-17 | End: 2022-01-17

## 2021-12-04 DIAGNOSIS — F41.1 GENERALIZED ANXIETY DISORDER: ICD-10-CM

## 2021-12-04 DIAGNOSIS — F31.12 BIPOLAR AFFECTIVE DISORDER, CURRENTLY MANIC, MODERATE (CMD): ICD-10-CM

## 2021-12-08 ENCOUNTER — APPOINTMENT (OUTPATIENT)
Dept: PULMONOLOGY | Age: 66
End: 2021-12-08
Attending: INTERNAL MEDICINE

## 2021-12-08 RX ORDER — MODAFINIL 200 MG/1
200 TABLET ORAL 2 TIMES DAILY
Qty: 180 TABLET | Refills: 1 | Status: SHIPPED | OUTPATIENT
Start: 2021-12-08 | End: 2022-03-22 | Stop reason: SDUPTHER

## 2021-12-09 DIAGNOSIS — Z86.39 HISTORY OF HYPOKALEMIA: ICD-10-CM

## 2021-12-09 DIAGNOSIS — M79.89 LEG SWELLING: ICD-10-CM

## 2021-12-09 DIAGNOSIS — F31.12 BIPOLAR AFFECTIVE DISORDER, CURRENTLY MANIC, MODERATE (CMD): ICD-10-CM

## 2021-12-09 RX ORDER — FUROSEMIDE 40 MG/1
TABLET ORAL
Qty: 180 TABLET | Refills: 1 | Status: SHIPPED | OUTPATIENT
Start: 2021-12-09 | End: 2022-03-08

## 2021-12-09 RX ORDER — IBUPROFEN 800 MG/1
TABLET ORAL
Qty: 60 TABLET | Refills: 1 | Status: SHIPPED | OUTPATIENT
Start: 2021-12-09 | End: 2022-01-31

## 2021-12-09 RX ORDER — POTASSIUM CHLORIDE 1500 MG/1
TABLET, EXTENDED RELEASE ORAL
Qty: 90 TABLET | Refills: 1 | Status: SHIPPED | OUTPATIENT
Start: 2021-12-09 | End: 2022-08-25 | Stop reason: SDUPTHER

## 2021-12-09 RX ORDER — ESOMEPRAZOLE MAGNESIUM 40 MG/1
CAPSULE, DELAYED RELEASE ORAL
Qty: 90 CAPSULE | Refills: 1 | Status: SHIPPED | OUTPATIENT
Start: 2021-12-09 | End: 2022-03-08

## 2021-12-15 RX ORDER — DULOXETIN HYDROCHLORIDE 60 MG/1
CAPSULE, DELAYED RELEASE ORAL
Qty: 180 CAPSULE | Refills: 0 | Status: SHIPPED | OUTPATIENT
Start: 2021-12-15 | End: 2022-01-27 | Stop reason: SDUPTHER

## 2021-12-15 RX ORDER — LAMOTRIGINE 25 MG/1
TABLET ORAL
Qty: 180 TABLET | Refills: 0 | Status: SHIPPED | OUTPATIENT
Start: 2021-12-15 | End: 2022-03-18

## 2021-12-15 RX ORDER — BUPROPION HYDROCHLORIDE 300 MG/1
TABLET ORAL
Qty: 90 TABLET | Refills: 0 | OUTPATIENT
Start: 2021-12-15

## 2021-12-21 RX ORDER — LEVOTHYROXINE SODIUM 0.05 MG/1
50 TABLET ORAL DAILY
Qty: 90 TABLET | Refills: 0 | Status: SHIPPED | OUTPATIENT
Start: 2021-12-21 | End: 2022-07-27 | Stop reason: SDUPTHER

## 2021-12-23 ENCOUNTER — APPOINTMENT (OUTPATIENT)
Dept: PULMONOLOGY | Age: 66
End: 2021-12-23
Attending: INTERNAL MEDICINE

## 2022-01-06 DIAGNOSIS — F41.1 GENERALIZED ANXIETY DISORDER: ICD-10-CM

## 2022-01-17 RX ORDER — BUPROPION HYDROCHLORIDE 150 MG/1
TABLET ORAL
Qty: 30 TABLET | Refills: 0 | Status: SHIPPED | OUTPATIENT
Start: 2022-01-17 | End: 2022-01-27 | Stop reason: SDUPTHER

## 2022-01-27 ENCOUNTER — BEHAVIORAL HEALTH (OUTPATIENT)
Dept: BEHAVIORAL HEALTH | Age: 67
End: 2022-01-27

## 2022-01-27 DIAGNOSIS — F31.12 BIPOLAR AFFECTIVE DISORDER, CURRENTLY MANIC, MODERATE (CMD): Primary | ICD-10-CM

## 2022-01-27 DIAGNOSIS — F41.1 GENERALIZED ANXIETY DISORDER: ICD-10-CM

## 2022-01-27 PROCEDURE — 99214 OFFICE O/P EST MOD 30 MIN: CPT | Performed by: NURSE PRACTITIONER

## 2022-01-27 RX ORDER — BUPROPION HYDROCHLORIDE 300 MG/1
300 TABLET ORAL DAILY
Qty: 90 TABLET | Refills: 5 | Status: SHIPPED | OUTPATIENT
Start: 2022-01-27 | End: 2022-01-28 | Stop reason: SDUPTHER

## 2022-01-27 RX ORDER — DULOXETIN HYDROCHLORIDE 60 MG/1
60 CAPSULE, DELAYED RELEASE ORAL 2 TIMES DAILY
Qty: 180 CAPSULE | Refills: 5 | Status: SHIPPED | OUTPATIENT
Start: 2022-01-27 | End: 2022-07-21 | Stop reason: SDUPTHER

## 2022-01-27 RX ORDER — BUPROPION HYDROCHLORIDE 150 MG/1
150 TABLET ORAL DAILY
Qty: 30 TABLET | Refills: 5 | Status: SHIPPED | OUTPATIENT
Start: 2022-01-27 | End: 2022-02-10 | Stop reason: DRUGHIGH

## 2022-01-28 ENCOUNTER — TELEPHONE (OUTPATIENT)
Dept: BEHAVIORAL HEALTH | Age: 67
End: 2022-01-28

## 2022-01-28 DIAGNOSIS — F31.12 BIPOLAR AFFECTIVE DISORDER, CURRENTLY MANIC, MODERATE (CMD): ICD-10-CM

## 2022-01-28 RX ORDER — BUPROPION HYDROCHLORIDE 300 MG/1
300 TABLET ORAL DAILY
Qty: 90 TABLET | Refills: 5 | Status: SHIPPED | OUTPATIENT
Start: 2022-01-28 | End: 2022-02-01

## 2022-01-29 DIAGNOSIS — F31.12 BIPOLAR AFFECTIVE DISORDER, CURRENTLY MANIC, MODERATE (CMD): ICD-10-CM

## 2022-01-31 ENCOUNTER — TELEPHONE (OUTPATIENT)
Dept: BEHAVIORAL HEALTH | Age: 67
End: 2022-01-31

## 2022-01-31 DIAGNOSIS — F31.12 BIPOLAR AFFECTIVE DISORDER, CURRENTLY MANIC, MODERATE (CMD): ICD-10-CM

## 2022-01-31 RX ORDER — IBUPROFEN 800 MG/1
TABLET ORAL
Qty: 60 TABLET | Refills: 1 | Status: SHIPPED | OUTPATIENT
Start: 2022-01-31 | End: 2022-03-08

## 2022-01-31 RX ORDER — BUPROPION HYDROCHLORIDE 300 MG/1
TABLET ORAL
Qty: 90 TABLET | Refills: 5 | OUTPATIENT
Start: 2022-01-31

## 2022-02-01 RX ORDER — BUPROPION HYDROCHLORIDE 300 MG/1
300 TABLET ORAL DAILY
Qty: 90 TABLET | Refills: 0 | Status: SHIPPED | OUTPATIENT
Start: 2022-02-01 | End: 2022-02-10 | Stop reason: DRUGHIGH

## 2022-02-06 DIAGNOSIS — F31.12 BIPOLAR AFFECTIVE DISORDER, CURRENTLY MANIC, MODERATE (CMD): ICD-10-CM

## 2022-02-06 DIAGNOSIS — F41.1 GENERALIZED ANXIETY DISORDER: ICD-10-CM

## 2022-02-10 ENCOUNTER — TELEPHONE (OUTPATIENT)
Dept: BEHAVIORAL HEALTH | Age: 67
End: 2022-02-10

## 2022-02-10 DIAGNOSIS — F31.12 BIPOLAR AFFECTIVE DISORDER, CURRENTLY MANIC, MODERATE (CMD): ICD-10-CM

## 2022-02-10 RX ORDER — BUPROPION HYDROCHLORIDE 450 MG/1
450 TABLET, FILM COATED, EXTENDED RELEASE ORAL DAILY
Qty: 30 TABLET | Refills: 0 | Status: SHIPPED | OUTPATIENT
Start: 2022-02-10 | End: 2022-04-06 | Stop reason: SDUPTHER

## 2022-02-16 RX ORDER — BUPROPION HYDROCHLORIDE 150 MG/1
TABLET ORAL
Qty: 30 TABLET | Refills: 0 | OUTPATIENT
Start: 2022-02-16

## 2022-03-08 DIAGNOSIS — M79.89 LEG SWELLING: ICD-10-CM

## 2022-03-08 DIAGNOSIS — M79.605 BILATERAL LEG PAIN: ICD-10-CM

## 2022-03-08 DIAGNOSIS — M79.604 BILATERAL LEG PAIN: ICD-10-CM

## 2022-03-08 RX ORDER — IBUPROFEN 800 MG/1
TABLET ORAL
Qty: 60 TABLET | Refills: 1 | Status: SHIPPED | OUTPATIENT
Start: 2022-03-08 | End: 2022-04-20 | Stop reason: DRUGHIGH

## 2022-03-08 RX ORDER — ESOMEPRAZOLE MAGNESIUM 40 MG/1
CAPSULE, DELAYED RELEASE ORAL
Qty: 90 CAPSULE | Refills: 1 | Status: SHIPPED | OUTPATIENT
Start: 2022-03-08 | End: 2022-03-11 | Stop reason: SDUPTHER

## 2022-03-08 RX ORDER — FUROSEMIDE 40 MG/1
TABLET ORAL
Qty: 180 TABLET | Refills: 1 | Status: SHIPPED | OUTPATIENT
Start: 2022-03-08 | End: 2022-08-23 | Stop reason: SDUPTHER

## 2022-03-08 RX ORDER — GABAPENTIN 300 MG/1
CAPSULE ORAL
Qty: 30 CAPSULE | Refills: 3 | Status: SHIPPED | OUTPATIENT
Start: 2022-03-08 | End: 2022-07-12

## 2022-03-11 ENCOUNTER — OFFICE VISIT (OUTPATIENT)
Dept: FAMILY MEDICINE | Age: 67
End: 2022-03-11

## 2022-03-11 ENCOUNTER — LAB SERVICES (OUTPATIENT)
Dept: LAB | Age: 67
End: 2022-03-11

## 2022-03-11 VITALS
HEIGHT: 65 IN | WEIGHT: 153 LBS | SYSTOLIC BLOOD PRESSURE: 139 MMHG | RESPIRATION RATE: 18 BRPM | DIASTOLIC BLOOD PRESSURE: 76 MMHG | HEART RATE: 70 BPM | TEMPERATURE: 99.1 F | BODY MASS INDEX: 25.49 KG/M2

## 2022-03-11 DIAGNOSIS — I10 PRIMARY HYPERTENSION: ICD-10-CM

## 2022-03-11 DIAGNOSIS — N18.30 STAGE 3 CHRONIC KIDNEY DISEASE, UNSPECIFIED WHETHER STAGE 3A OR 3B CKD (CMD): ICD-10-CM

## 2022-03-11 DIAGNOSIS — K21.9 GASTROESOPHAGEAL REFLUX DISEASE, UNSPECIFIED WHETHER ESOPHAGITIS PRESENT: ICD-10-CM

## 2022-03-11 DIAGNOSIS — E03.9 HYPOTHYROIDISM, UNSPECIFIED TYPE: ICD-10-CM

## 2022-03-11 DIAGNOSIS — I10 PRIMARY HYPERTENSION: Primary | ICD-10-CM

## 2022-03-11 LAB
ALBUMIN SERPL-MCNC: 3.8 G/DL (ref 3.6–5.1)
ALBUMIN/GLOB SERPL: 1.2 {RATIO} (ref 1–2.4)
ALP SERPL-CCNC: 99 UNITS/L (ref 45–117)
ALT SERPL-CCNC: 20 UNITS/L
ANION GAP SERPL CALC-SCNC: 6 MMOL/L (ref 10–20)
AST SERPL-CCNC: 15 UNITS/L
BILIRUB SERPL-MCNC: 0.3 MG/DL (ref 0.2–1)
BUN SERPL-MCNC: 23 MG/DL (ref 6–20)
BUN/CREAT SERPL: 17 (ref 7–25)
CALCIUM SERPL-MCNC: 9.4 MG/DL (ref 8.4–10.2)
CHLORIDE SERPL-SCNC: 106 MMOL/L (ref 98–107)
CHOLEST SERPL-MCNC: 229 MG/DL
CHOLEST/HDLC SERPL: 3.1 {RATIO}
CO2 SERPL-SCNC: 34 MMOL/L (ref 21–32)
CREAT SERPL-MCNC: 1.38 MG/DL (ref 0.51–0.95)
DEPRECATED RDW RBC: 50.2 FL (ref 39–50)
ERYTHROCYTE [DISTWIDTH] IN BLOOD: 12.1 % (ref 11–15)
FASTING DURATION TIME PATIENT: ABNORMAL H
FASTING DURATION TIME PATIENT: ABNORMAL H
GFR SERPLBLD BASED ON 1.73 SQ M-ARVRAT: 40 ML/MIN
GLOBULIN SER-MCNC: 3.1 G/DL (ref 2–4)
GLUCOSE SERPL-MCNC: 90 MG/DL (ref 70–99)
HCT VFR BLD CALC: 40.7 % (ref 36–46.5)
HDLC SERPL-MCNC: 73 MG/DL
HGB BLD-MCNC: 12.6 G/DL (ref 12–15.5)
LDLC SERPL CALC-MCNC: 117 MG/DL
MCH RBC QN AUTO: 34.3 PG (ref 26–34)
MCHC RBC AUTO-ENTMCNC: 31 G/DL (ref 32–36.5)
MCV RBC AUTO: 110.9 FL (ref 78–100)
NONHDLC SERPL-MCNC: 156 MG/DL
NRBC BLD MANUAL-RTO: 0 /100 WBC
PLATELET # BLD AUTO: 276 K/MCL (ref 140–450)
POTASSIUM SERPL-SCNC: 4.5 MMOL/L (ref 3.4–5.1)
PROT SERPL-MCNC: 6.9 G/DL (ref 6.4–8.2)
RBC # BLD: 3.67 MIL/MCL (ref 4–5.2)
SODIUM SERPL-SCNC: 141 MMOL/L (ref 135–145)
TRIGL SERPL-MCNC: 193 MG/DL
TSH SERPL-ACNC: 0.97 MCUNITS/ML (ref 0.35–5)
WBC # BLD: 5.8 K/MCL (ref 4.2–11)

## 2022-03-11 PROCEDURE — 80061 LIPID PANEL: CPT | Performed by: CLINICAL MEDICAL LABORATORY

## 2022-03-11 PROCEDURE — 84443 ASSAY THYROID STIM HORMONE: CPT | Performed by: CLINICAL MEDICAL LABORATORY

## 2022-03-11 PROCEDURE — 99214 OFFICE O/P EST MOD 30 MIN: CPT | Performed by: FAMILY MEDICINE

## 2022-03-11 PROCEDURE — 85027 COMPLETE CBC AUTOMATED: CPT | Performed by: CLINICAL MEDICAL LABORATORY

## 2022-03-11 PROCEDURE — 80053 COMPREHEN METABOLIC PANEL: CPT | Performed by: CLINICAL MEDICAL LABORATORY

## 2022-03-11 PROCEDURE — 36415 COLL VENOUS BLD VENIPUNCTURE: CPT | Performed by: INTERNAL MEDICINE

## 2022-03-11 RX ORDER — ESOMEPRAZOLE MAGNESIUM 40 MG/1
40 CAPSULE, DELAYED RELEASE ORAL 2 TIMES DAILY
Qty: 180 CAPSULE | Refills: 1 | Status: SHIPPED | OUTPATIENT
Start: 2022-03-11 | End: 2022-04-20 | Stop reason: SDUPTHER

## 2022-03-14 ENCOUNTER — TELEPHONE (OUTPATIENT)
Dept: FAMILY MEDICINE | Age: 67
End: 2022-03-14

## 2022-03-16 DIAGNOSIS — F41.1 GENERALIZED ANXIETY DISORDER: ICD-10-CM

## 2022-03-16 DIAGNOSIS — F31.12 BIPOLAR AFFECTIVE DISORDER, CURRENTLY MANIC, MODERATE (CMD): ICD-10-CM

## 2022-03-18 RX ORDER — LAMOTRIGINE 25 MG/1
TABLET ORAL
Qty: 180 TABLET | Refills: 1 | Status: SHIPPED | OUTPATIENT
Start: 2022-03-18 | End: 2022-09-22

## 2022-03-18 RX ORDER — DULOXETIN HYDROCHLORIDE 60 MG/1
CAPSULE, DELAYED RELEASE ORAL
Qty: 180 CAPSULE | Refills: 5 | OUTPATIENT
Start: 2022-03-18

## 2022-03-22 ENCOUNTER — TELEPHONIC VISIT (OUTPATIENT)
Dept: PULMONOLOGY | Age: 67
End: 2022-03-22
Attending: INTERNAL MEDICINE

## 2022-03-22 DIAGNOSIS — Z87.891 HISTORY OF TOBACCO USE: Primary | ICD-10-CM

## 2022-03-22 DIAGNOSIS — G47.10 HYPERSOMNOLENCE: ICD-10-CM

## 2022-03-22 PROCEDURE — 99211 OFF/OP EST MAY X REQ PHY/QHP: CPT

## 2022-03-22 RX ORDER — MODAFINIL 200 MG/1
200 TABLET ORAL 2 TIMES DAILY
Qty: 180 TABLET | Refills: 3 | Status: SHIPPED | OUTPATIENT
Start: 2022-03-22 | End: 2022-09-27 | Stop reason: SDUPTHER

## 2022-03-23 DIAGNOSIS — F31.12 BIPOLAR AFFECTIVE DISORDER, CURRENTLY MANIC, MODERATE (CMD): ICD-10-CM

## 2022-03-25 RX ORDER — BUPROPION HYDROCHLORIDE 300 MG/1
TABLET ORAL
Qty: 90 TABLET | Refills: 0 | OUTPATIENT
Start: 2022-03-25

## 2022-03-30 ENCOUNTER — TELEPHONE (OUTPATIENT)
Dept: ADMISSION | Age: 67
End: 2022-03-30

## 2022-04-08 RX ORDER — QUINAPRIL 20 MG/1
20 TABLET ORAL DAILY
Qty: 90 TABLET | Refills: 0 | Status: SHIPPED | OUTPATIENT
Start: 2022-04-08 | End: 2022-04-20 | Stop reason: SDUPTHER

## 2022-04-08 RX ORDER — BUPROPION HYDROCHLORIDE 450 MG/1
450 TABLET, FILM COATED, EXTENDED RELEASE ORAL DAILY
Qty: 30 TABLET | Refills: 3 | Status: SHIPPED | OUTPATIENT
Start: 2022-04-08 | End: 2022-07-13

## 2022-04-20 ENCOUNTER — APPOINTMENT (OUTPATIENT)
Dept: FAMILY MEDICINE | Age: 67
End: 2022-04-20

## 2022-04-20 ENCOUNTER — OFFICE VISIT (OUTPATIENT)
Dept: FAMILY MEDICINE | Age: 67
End: 2022-04-20

## 2022-04-20 VITALS
BODY MASS INDEX: 25.49 KG/M2 | HEART RATE: 72 BPM | HEIGHT: 65 IN | WEIGHT: 153 LBS | SYSTOLIC BLOOD PRESSURE: 126 MMHG | DIASTOLIC BLOOD PRESSURE: 72 MMHG | RESPIRATION RATE: 12 BRPM

## 2022-04-20 DIAGNOSIS — I50.32 CHRONIC DIASTOLIC HEART FAILURE (CMD): ICD-10-CM

## 2022-04-20 DIAGNOSIS — N18.32 STAGE 3B CHRONIC KIDNEY DISEASE (CMD): ICD-10-CM

## 2022-04-20 DIAGNOSIS — K21.9 GASTROESOPHAGEAL REFLUX DISEASE, UNSPECIFIED WHETHER ESOPHAGITIS PRESENT: ICD-10-CM

## 2022-04-20 DIAGNOSIS — K21.9 GASTROESOPHAGEAL REFLUX DISEASE WITHOUT ESOPHAGITIS: ICD-10-CM

## 2022-04-20 DIAGNOSIS — M16.11 OSTEOARTHRITIS OF RIGHT HIP, UNSPECIFIED OSTEOARTHRITIS TYPE: ICD-10-CM

## 2022-04-20 DIAGNOSIS — Z23 NEED FOR VACCINATION: ICD-10-CM

## 2022-04-20 DIAGNOSIS — I10 PRIMARY HYPERTENSION: Primary | ICD-10-CM

## 2022-04-20 DIAGNOSIS — F31.12 BIPOLAR AFFECTIVE DISORDER, CURRENTLY MANIC, MODERATE (CMD): ICD-10-CM

## 2022-04-20 PROBLEM — F32.A ANXIETY AND DEPRESSION: Status: RESOLVED | Noted: 2019-03-14 | Resolved: 2022-04-20

## 2022-04-20 PROBLEM — F41.9 ANXIETY AND DEPRESSION: Status: RESOLVED | Noted: 2019-03-14 | Resolved: 2022-04-20

## 2022-04-20 PROCEDURE — G0009 ADMIN PNEUMOCOCCAL VACCINE: HCPCS | Performed by: FAMILY MEDICINE

## 2022-04-20 PROCEDURE — 90732 PPSV23 VACC 2 YRS+ SUBQ/IM: CPT | Performed by: FAMILY MEDICINE

## 2022-04-20 PROCEDURE — 99214 OFFICE O/P EST MOD 30 MIN: CPT | Performed by: FAMILY MEDICINE

## 2022-04-20 RX ORDER — ESOMEPRAZOLE MAGNESIUM 40 MG/1
40 CAPSULE, DELAYED RELEASE ORAL 2 TIMES DAILY
Qty: 180 CAPSULE | Refills: 3 | Status: SHIPPED | OUTPATIENT
Start: 2022-04-20 | End: 2022-07-27 | Stop reason: SDUPTHER

## 2022-04-20 RX ORDER — QUINAPRIL 20 MG/1
20 TABLET ORAL DAILY
Qty: 90 TABLET | Refills: 3 | Status: SHIPPED | OUTPATIENT
Start: 2022-04-20 | End: 2022-11-16 | Stop reason: SDUPTHER

## 2022-04-20 ASSESSMENT — PATIENT HEALTH QUESTIONNAIRE - PHQ9
SUM OF ALL RESPONSES TO PHQ9 QUESTIONS 1 AND 2: 0
2. FEELING DOWN, DEPRESSED OR HOPELESS: NOT AT ALL
SUM OF ALL RESPONSES TO PHQ9 QUESTIONS 1 AND 2: 0
1. LITTLE INTEREST OR PLEASURE IN DOING THINGS: NOT AT ALL
CLINICAL INTERPRETATION OF PHQ2 SCORE: NO FURTHER SCREENING NEEDED

## 2022-05-12 ENCOUNTER — APPOINTMENT (OUTPATIENT)
Dept: FAMILY MEDICINE | Age: 67
End: 2022-05-12

## 2022-06-07 ENCOUNTER — IMMUNIZATION (OUTPATIENT)
Dept: INTERNAL MEDICINE | Age: 67
End: 2022-06-07

## 2022-06-07 ENCOUNTER — APPOINTMENT (OUTPATIENT)
Dept: FAMILY MEDICINE | Age: 67
End: 2022-06-07

## 2022-06-07 ENCOUNTER — OFFICE VISIT (OUTPATIENT)
Dept: FAMILY MEDICINE | Age: 67
End: 2022-06-07

## 2022-06-07 VITALS
WEIGHT: 156 LBS | RESPIRATION RATE: 14 BRPM | HEIGHT: 65 IN | SYSTOLIC BLOOD PRESSURE: 132 MMHG | BODY MASS INDEX: 25.99 KG/M2 | DIASTOLIC BLOOD PRESSURE: 70 MMHG | HEART RATE: 98 BPM

## 2022-06-07 DIAGNOSIS — N18.32 STAGE 3B CHRONIC KIDNEY DISEASE (CMD): Primary | ICD-10-CM

## 2022-06-07 DIAGNOSIS — Z97.2 POORLY FITTING DENTURES: ICD-10-CM

## 2022-06-07 DIAGNOSIS — Z23 NEED FOR COVID-19 VACCINE: Primary | ICD-10-CM

## 2022-06-07 DIAGNOSIS — Z23 NEED FOR VACCINATION: ICD-10-CM

## 2022-06-07 DIAGNOSIS — K08.89 POORLY FITTING DENTURES: ICD-10-CM

## 2022-06-07 DIAGNOSIS — I50.32 CHRONIC DIASTOLIC HEART FAILURE (CMD): ICD-10-CM

## 2022-06-07 DIAGNOSIS — F31.61 BIPOLAR DISORDER, CURRENT EPISODE MIXED, MILD (CMD): ICD-10-CM

## 2022-06-07 PROBLEM — Z98.84 HISTORY OF GASTRIC BYPASS: Status: ACTIVE | Noted: 2022-06-07

## 2022-06-07 PROCEDURE — 99214 OFFICE O/P EST MOD 30 MIN: CPT | Performed by: FAMILY MEDICINE

## 2022-06-07 PROCEDURE — 0064A COVID-19 MODERNA 0.25ML BOOSTER VACCINE: CPT | Performed by: INTERNAL MEDICINE

## 2022-06-07 PROCEDURE — 90471 IMMUNIZATION ADMIN: CPT | Performed by: FAMILY MEDICINE

## 2022-06-07 PROCEDURE — 91306 COVID-19 MODERNA 0.25ML BOOSTER VACCINE: CPT | Performed by: INTERNAL MEDICINE

## 2022-06-07 PROCEDURE — 90750 HZV VACC RECOMBINANT IM: CPT | Performed by: FAMILY MEDICINE

## 2022-06-14 ENCOUNTER — TELEPHONE (OUTPATIENT)
Dept: PULMONOLOGY | Age: 67
End: 2022-06-14

## 2022-07-12 DIAGNOSIS — M79.604 BILATERAL LEG PAIN: ICD-10-CM

## 2022-07-12 DIAGNOSIS — F31.12 BIPOLAR AFFECTIVE DISORDER, CURRENTLY MANIC, MODERATE (CMD): ICD-10-CM

## 2022-07-12 DIAGNOSIS — M79.605 BILATERAL LEG PAIN: ICD-10-CM

## 2022-07-12 RX ORDER — GABAPENTIN 300 MG/1
CAPSULE ORAL
Qty: 30 CAPSULE | Refills: 3 | Status: SHIPPED | OUTPATIENT
Start: 2022-07-12 | End: 2022-10-26

## 2022-07-13 RX ORDER — BUPROPION HYDROCHLORIDE 150 MG/1
TABLET ORAL
Qty: 90 TABLET | Refills: 0 | Status: SHIPPED | OUTPATIENT
Start: 2022-07-13 | End: 2022-07-21 | Stop reason: SDUPTHER

## 2022-07-14 RX ORDER — BUPROPION HYDROCHLORIDE 300 MG/1
TABLET ORAL
Qty: 90 TABLET | Refills: 0 | OUTPATIENT
Start: 2022-07-14

## 2022-07-19 ENCOUNTER — HOSPITAL ENCOUNTER (OUTPATIENT)
Dept: PAIN MANAGEMENT | Age: 67
Discharge: HOME | End: 2022-07-19
Attending: PAIN MEDICINE

## 2022-07-19 ENCOUNTER — HOSPITAL ENCOUNTER (OUTPATIENT)
Dept: GENERAL RADIOLOGY | Age: 67
Discharge: HOME | End: 2022-07-19
Attending: PAIN MEDICINE

## 2022-07-19 VITALS
DIASTOLIC BLOOD PRESSURE: 70 MMHG | HEART RATE: 74 BPM | OXYGEN SATURATION: 98 % | RESPIRATION RATE: 16 BRPM | SYSTOLIC BLOOD PRESSURE: 129 MMHG

## 2022-07-19 DIAGNOSIS — M54.12 CERVICAL RADICULOPATHY: ICD-10-CM

## 2022-07-19 DIAGNOSIS — M50.320 DEGENERATION OF INTERVERTEBRAL DISC OF MID-CERVICAL REGION, UNSPECIFIED SPINAL LEVEL: Primary | ICD-10-CM

## 2022-07-19 PROCEDURE — 10002805 HB CONTRAST AGENT: Performed by: PAIN MEDICINE

## 2022-07-19 PROCEDURE — 10004187 HB COUNTER-VISIT CENSUS, OUTPATIENT

## 2022-07-19 PROCEDURE — 10000258 HB EPIDURAL INJ 1ST LEVEL W/ IMAGING

## 2022-07-19 PROCEDURE — 10002800 HB RX 250 W HCPCS: Performed by: PAIN MEDICINE

## 2022-07-19 PROCEDURE — 10005281 FL PACS RECORD NR

## 2022-07-19 RX ORDER — DEXAMETHASONE SODIUM PHOSPHATE 10 MG/ML
20 INJECTION, SOLUTION INTRAMUSCULAR; INTRAVENOUS ONCE
Status: COMPLETED | OUTPATIENT
Start: 2022-07-19 | End: 2022-07-19

## 2022-07-19 RX ADMIN — IOHEXOL 3 ML: 180 INJECTION INTRAVENOUS at 10:54

## 2022-07-19 RX ADMIN — DEXAMETHASONE SODIUM PHOSPHATE 20 MG: 10 INJECTION, SOLUTION INTRAMUSCULAR; INTRAVENOUS at 10:54

## 2022-07-19 ASSESSMENT — PAIN SCALES - GENERAL: PAINLEVEL_OUTOF10: 3

## 2022-07-21 ENCOUNTER — BEHAVIORAL HEALTH (OUTPATIENT)
Dept: BEHAVIORAL HEALTH | Age: 67
End: 2022-07-21

## 2022-07-21 DIAGNOSIS — F41.1 GENERALIZED ANXIETY DISORDER: ICD-10-CM

## 2022-07-21 DIAGNOSIS — F31.12 BIPOLAR AFFECTIVE DISORDER, CURRENTLY MANIC, MODERATE (CMD): Primary | ICD-10-CM

## 2022-07-21 PROCEDURE — 99214 OFFICE O/P EST MOD 30 MIN: CPT | Performed by: NURSE PRACTITIONER

## 2022-07-21 RX ORDER — BUPROPION HYDROCHLORIDE 150 MG/1
450 TABLET ORAL DAILY
Qty: 90 TABLET | Refills: 5 | Status: SHIPPED | OUTPATIENT
Start: 2022-07-21 | End: 2022-11-16 | Stop reason: SDUPTHER

## 2022-07-21 RX ORDER — DULOXETIN HYDROCHLORIDE 60 MG/1
60 CAPSULE, DELAYED RELEASE ORAL 2 TIMES DAILY
Qty: 180 CAPSULE | Refills: 5 | Status: SHIPPED | OUTPATIENT
Start: 2022-07-21 | End: 2022-11-16 | Stop reason: SDUPTHER

## 2022-07-27 DIAGNOSIS — K21.9 GASTROESOPHAGEAL REFLUX DISEASE, UNSPECIFIED WHETHER ESOPHAGITIS PRESENT: ICD-10-CM

## 2022-07-27 RX ORDER — LEVOTHYROXINE SODIUM 0.05 MG/1
50 TABLET ORAL DAILY
Qty: 90 TABLET | Refills: 0 | Status: SHIPPED | OUTPATIENT
Start: 2022-07-27 | End: 2022-09-22

## 2022-07-27 RX ORDER — ESOMEPRAZOLE MAGNESIUM 40 MG/1
40 CAPSULE, DELAYED RELEASE ORAL 2 TIMES DAILY
Qty: 180 CAPSULE | Refills: 3 | Status: SHIPPED | OUTPATIENT
Start: 2022-07-27 | End: 2022-11-16 | Stop reason: SDUPTHER

## 2022-08-02 ENCOUNTER — HOSPITAL ENCOUNTER (OUTPATIENT)
Dept: PAIN MANAGEMENT | Age: 67
Discharge: HOME | End: 2022-08-02
Attending: PAIN MEDICINE

## 2022-08-02 ENCOUNTER — HOSPITAL ENCOUNTER (OUTPATIENT)
Dept: GENERAL RADIOLOGY | Age: 67
Discharge: HOME | End: 2022-08-02
Attending: PAIN MEDICINE

## 2022-08-02 VITALS
HEART RATE: 68 BPM | SYSTOLIC BLOOD PRESSURE: 117 MMHG | DIASTOLIC BLOOD PRESSURE: 63 MMHG | OXYGEN SATURATION: 98 % | RESPIRATION RATE: 16 BRPM

## 2022-08-02 DIAGNOSIS — M50.320 DEGENERATION OF INTERVERTEBRAL DISC OF MID-CERVICAL REGION, UNSPECIFIED SPINAL LEVEL: Primary | ICD-10-CM

## 2022-08-02 DIAGNOSIS — M54.12 CERVICAL RADICULOPATHY: ICD-10-CM

## 2022-08-02 PROCEDURE — 10004187 HB COUNTER-VISIT CENSUS, OUTPATIENT

## 2022-08-02 PROCEDURE — 10002800 HB RX 250 W HCPCS: Performed by: PAIN MEDICINE

## 2022-08-02 PROCEDURE — 10005281 FL PACS RECORD NR

## 2022-08-02 PROCEDURE — 10002805 HB CONTRAST AGENT: Performed by: PAIN MEDICINE

## 2022-08-02 PROCEDURE — 10000258 HB EPIDURAL INJ 1ST LEVEL W/ IMAGING

## 2022-08-02 RX ORDER — DEXAMETHASONE SODIUM PHOSPHATE 10 MG/ML
20 INJECTION, SOLUTION INTRAMUSCULAR; INTRAVENOUS ONCE
Status: COMPLETED | OUTPATIENT
Start: 2022-08-02 | End: 2022-08-02

## 2022-08-02 RX ADMIN — DEXAMETHASONE SODIUM PHOSPHATE 20 MG: 10 INJECTION, SOLUTION INTRAMUSCULAR; INTRAVENOUS at 09:43

## 2022-08-02 RX ADMIN — IOHEXOL 3 ML: 180 INJECTION INTRAVENOUS at 09:43

## 2022-08-02 ASSESSMENT — PAIN SCALES - GENERAL: PAINLEVEL_OUTOF10: 3

## 2022-08-09 ENCOUNTER — IMAGING SERVICES (OUTPATIENT)
Dept: GENERAL RADIOLOGY | Age: 67
End: 2022-08-09
Attending: ORTHOPAEDIC SURGERY

## 2022-08-09 ENCOUNTER — APPOINTMENT (OUTPATIENT)
Dept: ORTHOPEDICS | Age: 67
End: 2022-08-09

## 2022-08-09 ENCOUNTER — OFFICE VISIT (OUTPATIENT)
Dept: ORTHOPEDICS | Age: 67
End: 2022-08-09

## 2022-08-09 DIAGNOSIS — Z96.641 HISTORY OF TOTAL HIP REPLACEMENT, RIGHT: ICD-10-CM

## 2022-08-09 DIAGNOSIS — T84.84XA PAIN DUE TO TOTAL HIP REPLACEMENT, INITIAL ENCOUNTER (CMD): ICD-10-CM

## 2022-08-09 DIAGNOSIS — Z96.642 HISTORY OF REVISION OF TOTAL REPLACEMENT OF LEFT HIP JOINT: Primary | ICD-10-CM

## 2022-08-09 DIAGNOSIS — Z96.649 PAIN DUE TO TOTAL HIP REPLACEMENT, INITIAL ENCOUNTER (CMD): ICD-10-CM

## 2022-08-09 PROCEDURE — 99213 OFFICE O/P EST LOW 20 MIN: CPT | Performed by: ORTHOPAEDIC SURGERY

## 2022-08-09 PROCEDURE — 20610 DRAIN/INJ JOINT/BURSA W/O US: CPT | Performed by: ORTHOPAEDIC SURGERY

## 2022-08-09 PROCEDURE — 73502 X-RAY EXAM HIP UNI 2-3 VIEWS: CPT | Performed by: RADIOLOGY

## 2022-08-09 RX ORDER — TRIAMCINOLONE ACETONIDE 40 MG/ML
40 INJECTION, SUSPENSION INTRA-ARTICULAR; INTRAMUSCULAR ONCE
Status: COMPLETED | OUTPATIENT
Start: 2022-08-09 | End: 2022-08-09

## 2022-08-09 RX ADMIN — TRIAMCINOLONE ACETONIDE 40 MG: 40 INJECTION, SUSPENSION INTRA-ARTICULAR; INTRAMUSCULAR at 14:33

## 2022-08-19 ENCOUNTER — OFFICE VISIT (OUTPATIENT)
Dept: REHABILITATION | Age: 67
End: 2022-08-19
Attending: ORTHOPAEDIC SURGERY

## 2022-08-19 DIAGNOSIS — Z96.641 HISTORY OF TOTAL HIP REPLACEMENT, RIGHT: ICD-10-CM

## 2022-08-19 DIAGNOSIS — Z96.649 PAIN DUE TO TOTAL HIP REPLACEMENT, INITIAL ENCOUNTER (CMD): ICD-10-CM

## 2022-08-19 DIAGNOSIS — T84.84XA PAIN DUE TO TOTAL HIP REPLACEMENT, INITIAL ENCOUNTER (CMD): ICD-10-CM

## 2022-08-19 PROCEDURE — 97162 PT EVAL MOD COMPLEX 30 MIN: CPT

## 2022-08-19 PROCEDURE — 97110 THERAPEUTIC EXERCISES: CPT

## 2022-08-19 ASSESSMENT — MOVEMENT AND STRENGTH ASSESSMENTS
LIFTING AN OBJECT, LIKE A BAG OF GROCERIES, FROM THE FLOOR: EXTREME DIFFICULTY OR UNABLE TO PERFORM ACTIVITY
WALKING 2 BLOCKS: QUITE A BIT OF DIFFICULTY
HOPPING: EXTREME DIFFICULTY OR UNABLE TO PERFORM ACTIVITY
SITTING FOR 1 HOUR: EXTREME DIFFICULTY OR UNABLE TO PERFORM ACTIVITY
PERFORMING HEAVY ACTIVITIES AROUND YOUR HOME: EXTREME DIFFICULTY OR UNABLE TO PERFORM ACTIVITY
GETTING INTO OR OUT OF THE BATH: A LITTLE BIT OF DIFFICULTY
WALKING BETWEEN ROOMS: MODERATE DIFFICULTY
GETTING INTO OR OUT OF A CAR: QUITE A BIT OF DIFFICULTY
RUNNING ON EVEN GROUND: EXTREME DIFFICULTY OR UNABLE TO PERFORM ACTIVITY
RUNNING ON UNEVEN GROUND: EXTREME DIFFICULTY OR UNABLE TO PERFORM ACTIVITY
TOTAL SCORE: 20
WALKING A MILE: EXTREME DIFFICULTY OR UNABLE TO PERFORM ACTIVITY
ROLLING OVER IN BED: QUITE A BIT OF DIFFICULTY
PERFORMING LIGHT ACTIVITES AROUND YOUR HOME: MODERATE DIFFICULTY
ANY OF YOUR USUAL WORK, HOUSEWORK OR SCHOOL ACTIVITIES: MODERATE DIFFICULTY
SQUATTING: EXTREME DIFFICULTY OR UNABLE TO PERFORM ACTIVITY
GOING UP OR DOWN 10 STAIRS (ABOUT 1 FLIGHT OF STAIRS): QUITE A BIT OF DIFFICULTY
STANDING FOR 1 HOUR: EXTREME DIFFICULTY OR UNABLE TO PERFORM ACTIVITY
PUTTING ON YOUR SHOES OR SOCKS: MODERATE DIFFICULTY
MAKING SHARP TURNS WHILE RUNNING FAST: EXTREME DIFFICULTY OR UNABLE TO PERFORM ACTIVITY
YOUR USUAL HOBBIES, RECREATIONAL OR SPORTING ACTIVIITIES: QUITE A BIT OF DIFFICULTY

## 2022-08-22 ENCOUNTER — OFFICE VISIT (OUTPATIENT)
Dept: REHABILITATION | Age: 67
End: 2022-08-22

## 2022-08-22 DIAGNOSIS — T84.84XD PAIN DUE TO TOTAL HIP REPLACEMENT, SUBSEQUENT ENCOUNTER: ICD-10-CM

## 2022-08-22 DIAGNOSIS — Z96.649 PAIN DUE TO TOTAL HIP REPLACEMENT, INITIAL ENCOUNTER (CMD): ICD-10-CM

## 2022-08-22 DIAGNOSIS — Z96.641 HISTORY OF TOTAL HIP REPLACEMENT, RIGHT: Primary | ICD-10-CM

## 2022-08-22 DIAGNOSIS — T84.84XA PAIN DUE TO TOTAL HIP REPLACEMENT, INITIAL ENCOUNTER (CMD): ICD-10-CM

## 2022-08-22 DIAGNOSIS — Z96.649 PAIN DUE TO TOTAL HIP REPLACEMENT, SUBSEQUENT ENCOUNTER: ICD-10-CM

## 2022-08-22 PROCEDURE — 97110 THERAPEUTIC EXERCISES: CPT | Performed by: PHYSICAL THERAPY ASSISTANT

## 2022-08-23 ENCOUNTER — OFFICE VISIT (OUTPATIENT)
Dept: FAMILY MEDICINE | Age: 67
End: 2022-08-23

## 2022-08-23 VITALS
DIASTOLIC BLOOD PRESSURE: 74 MMHG | HEART RATE: 66 BPM | HEIGHT: 65 IN | WEIGHT: 157 LBS | RESPIRATION RATE: 16 BRPM | BODY MASS INDEX: 26.16 KG/M2 | SYSTOLIC BLOOD PRESSURE: 110 MMHG

## 2022-08-23 DIAGNOSIS — F31.61 BIPOLAR DISORDER, CURRENT EPISODE MIXED, MILD (CMD): ICD-10-CM

## 2022-08-23 DIAGNOSIS — M51.36 DDD (DEGENERATIVE DISC DISEASE), LUMBAR: ICD-10-CM

## 2022-08-23 DIAGNOSIS — I10 PRIMARY HYPERTENSION: ICD-10-CM

## 2022-08-23 DIAGNOSIS — Z86.39 HISTORY OF HYPOKALEMIA: ICD-10-CM

## 2022-08-23 DIAGNOSIS — N18.32 STAGE 3B CHRONIC KIDNEY DISEASE (CMD): Primary | ICD-10-CM

## 2022-08-23 DIAGNOSIS — M79.89 LEG SWELLING: ICD-10-CM

## 2022-08-23 DIAGNOSIS — K21.9 GASTROESOPHAGEAL REFLUX DISEASE WITHOUT ESOPHAGITIS: ICD-10-CM

## 2022-08-23 DIAGNOSIS — F51.01 PRIMARY INSOMNIA: ICD-10-CM

## 2022-08-23 DIAGNOSIS — J43.1 PANLOBULAR EMPHYSEMA (CMD): ICD-10-CM

## 2022-08-23 PROBLEM — Z72.820 SLEEP DEPRIVATION: Status: RESOLVED | Noted: 2019-03-14 | Resolved: 2022-08-23

## 2022-08-23 PROCEDURE — 99214 OFFICE O/P EST MOD 30 MIN: CPT | Performed by: FAMILY MEDICINE

## 2022-08-23 RX ORDER — IBUPROFEN 800 MG/1
800 TABLET ORAL EVERY 8 HOURS PRN
Qty: 90 TABLET | Refills: 2 | Status: SHIPPED | OUTPATIENT
Start: 2022-08-23 | End: 2022-12-06

## 2022-08-23 RX ORDER — FUROSEMIDE 40 MG/1
40 TABLET ORAL 2 TIMES DAILY
Qty: 180 TABLET | Refills: 1 | Status: SHIPPED | OUTPATIENT
Start: 2022-08-23 | End: 2022-11-16 | Stop reason: SDUPTHER

## 2022-08-23 RX ORDER — FLUTICASONE PROPIONATE AND SALMETEROL XINAFOATE 115; 21 UG/1; UG/1
2 AEROSOL, METERED RESPIRATORY (INHALATION) 2 TIMES DAILY
Qty: 12 G | Refills: 3 | Status: SHIPPED | OUTPATIENT
Start: 2022-08-23 | End: 2022-11-16 | Stop reason: SDUPTHER

## 2022-08-23 RX ORDER — IBUPROFEN 800 MG/1
800 TABLET ORAL EVERY 8 HOURS PRN
COMMUNITY
End: 2022-08-23 | Stop reason: SDUPTHER

## 2022-08-23 RX ORDER — ALBUTEROL SULFATE 90 UG/1
2 AEROSOL, METERED RESPIRATORY (INHALATION) EVERY 4 HOURS PRN
Qty: 1 EACH | Refills: 3 | Status: SHIPPED | OUTPATIENT
Start: 2022-08-23 | End: 2022-11-16 | Stop reason: SDUPTHER

## 2022-08-25 RX ORDER — POTASSIUM CHLORIDE 1500 MG/1
20 TABLET, EXTENDED RELEASE ORAL DAILY
Qty: 90 TABLET | Refills: 1 | Status: SHIPPED | OUTPATIENT
Start: 2022-08-25 | End: 2023-02-27

## 2022-08-26 ENCOUNTER — APPOINTMENT (OUTPATIENT)
Dept: REHABILITATION | Age: 67
End: 2022-08-26

## 2022-08-30 ENCOUNTER — OFFICE VISIT (OUTPATIENT)
Dept: REHABILITATION | Age: 67
End: 2022-08-30

## 2022-08-30 DIAGNOSIS — Z96.641 HISTORY OF TOTAL HIP REPLACEMENT, RIGHT: Primary | ICD-10-CM

## 2022-08-30 DIAGNOSIS — T84.84XD PAIN DUE TO TOTAL HIP REPLACEMENT, SUBSEQUENT ENCOUNTER: ICD-10-CM

## 2022-08-30 DIAGNOSIS — Z96.649 PAIN DUE TO TOTAL HIP REPLACEMENT, SUBSEQUENT ENCOUNTER: ICD-10-CM

## 2022-08-30 PROCEDURE — 97110 THERAPEUTIC EXERCISES: CPT

## 2022-08-30 PROCEDURE — 97112 NEUROMUSCULAR REEDUCATION: CPT

## 2022-09-01 ENCOUNTER — OFFICE VISIT (OUTPATIENT)
Dept: REHABILITATION | Age: 67
End: 2022-09-01

## 2022-09-01 DIAGNOSIS — Z96.641 HISTORY OF TOTAL HIP REPLACEMENT, RIGHT: ICD-10-CM

## 2022-09-01 DIAGNOSIS — T84.84XD PAIN DUE TO TOTAL HIP REPLACEMENT, SUBSEQUENT ENCOUNTER: ICD-10-CM

## 2022-09-01 DIAGNOSIS — Z96.649 PAIN DUE TO TOTAL HIP REPLACEMENT, SUBSEQUENT ENCOUNTER: ICD-10-CM

## 2022-09-01 PROCEDURE — 97110 THERAPEUTIC EXERCISES: CPT | Performed by: PHYSICAL THERAPY ASSISTANT

## 2022-09-06 ENCOUNTER — APPOINTMENT (OUTPATIENT)
Dept: REHABILITATION | Age: 67
End: 2022-09-06

## 2022-09-06 RX ORDER — LANOLIN ALCOHOL/MO/W.PET/CERES
100 CREAM (GRAM) TOPICAL DAILY
Qty: 90 TABLET | Refills: 3 | Status: SHIPPED | OUTPATIENT
Start: 2022-09-06

## 2022-09-08 ENCOUNTER — OFFICE VISIT (OUTPATIENT)
Dept: REHABILITATION | Age: 67
End: 2022-09-08

## 2022-09-08 DIAGNOSIS — T84.84XD PAIN DUE TO TOTAL HIP REPLACEMENT, SUBSEQUENT ENCOUNTER: ICD-10-CM

## 2022-09-08 DIAGNOSIS — Z96.649 PAIN DUE TO TOTAL HIP REPLACEMENT, SUBSEQUENT ENCOUNTER: ICD-10-CM

## 2022-09-08 DIAGNOSIS — Z96.641 HISTORY OF TOTAL HIP REPLACEMENT, RIGHT: ICD-10-CM

## 2022-09-08 PROCEDURE — 97110 THERAPEUTIC EXERCISES: CPT | Performed by: PHYSICAL THERAPY ASSISTANT

## 2022-09-13 ENCOUNTER — OFFICE VISIT (OUTPATIENT)
Dept: REHABILITATION | Age: 67
End: 2022-09-13

## 2022-09-13 DIAGNOSIS — Z96.641 HISTORY OF TOTAL HIP REPLACEMENT, RIGHT: ICD-10-CM

## 2022-09-13 PROCEDURE — 97110 THERAPEUTIC EXERCISES: CPT | Performed by: PHYSICAL THERAPY ASSISTANT

## 2022-09-15 ENCOUNTER — TELEPHONE (OUTPATIENT)
Dept: CARE COORDINATION | Age: 67
End: 2022-09-15

## 2022-09-20 ENCOUNTER — OFFICE VISIT (OUTPATIENT)
Dept: REHABILITATION | Age: 67
End: 2022-09-20

## 2022-09-20 DIAGNOSIS — Z96.649 PAIN DUE TO TOTAL HIP REPLACEMENT, SUBSEQUENT ENCOUNTER: ICD-10-CM

## 2022-09-20 DIAGNOSIS — Z96.641 HISTORY OF TOTAL HIP REPLACEMENT, RIGHT: Primary | ICD-10-CM

## 2022-09-20 DIAGNOSIS — T84.84XD PAIN DUE TO TOTAL HIP REPLACEMENT, SUBSEQUENT ENCOUNTER: ICD-10-CM

## 2022-09-20 PROCEDURE — 97110 THERAPEUTIC EXERCISES: CPT

## 2022-09-22 ENCOUNTER — HOSPITAL ENCOUNTER (OUTPATIENT)
Dept: GENERAL RADIOLOGY | Age: 67
Discharge: HOME | End: 2022-09-22
Attending: PAIN MEDICINE

## 2022-09-22 ENCOUNTER — HOSPITAL ENCOUNTER (OUTPATIENT)
Dept: PAIN MANAGEMENT | Age: 67
Discharge: HOME | End: 2022-09-22
Attending: PAIN MEDICINE

## 2022-09-22 VITALS
RESPIRATION RATE: 16 BRPM | SYSTOLIC BLOOD PRESSURE: 131 MMHG | HEART RATE: 68 BPM | DIASTOLIC BLOOD PRESSURE: 65 MMHG | OXYGEN SATURATION: 98 %

## 2022-09-22 DIAGNOSIS — F31.12 BIPOLAR AFFECTIVE DISORDER, CURRENTLY MANIC, MODERATE (CMD): ICD-10-CM

## 2022-09-22 DIAGNOSIS — F41.1 GENERALIZED ANXIETY DISORDER: ICD-10-CM

## 2022-09-22 DIAGNOSIS — M54.2 CERVICALGIA: ICD-10-CM

## 2022-09-22 DIAGNOSIS — M47.812 CERVICAL SPONDYLOSIS WITHOUT MYELOPATHY: ICD-10-CM

## 2022-09-22 DIAGNOSIS — M50.320 DEGENERATION OF INTERVERTEBRAL DISC OF MID-CERVICAL REGION, UNSPECIFIED SPINAL LEVEL: Primary | ICD-10-CM

## 2022-09-22 PROCEDURE — 10001481 HB INJ FACET W/IMAGE 3 OR >

## 2022-09-22 PROCEDURE — 10004187 HB COUNTER-VISIT CENSUS, OUTPATIENT

## 2022-09-22 PROCEDURE — 10002801 HB RX 250 W/O HCPCS: Performed by: PAIN MEDICINE

## 2022-09-22 PROCEDURE — 10001477 HB INJECT FACET W/IMAGE 1ST

## 2022-09-22 PROCEDURE — 10001479 HB INJECT FACET W/IMAGE 2ND

## 2022-09-22 PROCEDURE — 10005281 FL PACS RECORD NR

## 2022-09-22 RX ORDER — BUPIVACAINE HYDROCHLORIDE 5 MG/ML
10 INJECTION, SOLUTION EPIDURAL; INTRACAUDAL ONCE
Status: COMPLETED | OUTPATIENT
Start: 2022-09-22 | End: 2022-09-22

## 2022-09-22 RX ORDER — LEVOTHYROXINE SODIUM 0.05 MG/1
TABLET ORAL
Qty: 90 TABLET | Refills: 0 | Status: SHIPPED | OUTPATIENT
Start: 2022-09-22 | End: 2022-10-26

## 2022-09-22 RX ORDER — LAMOTRIGINE 25 MG/1
TABLET ORAL
Qty: 180 TABLET | Refills: 0 | Status: SHIPPED | OUTPATIENT
Start: 2022-09-22 | End: 2022-11-16 | Stop reason: SDUPTHER

## 2022-09-22 RX ORDER — BUPIVACAINE HYDROCHLORIDE 2.5 MG/ML
30 INJECTION, SOLUTION EPIDURAL; INFILTRATION; INTRACAUDAL ONCE
Status: COMPLETED | OUTPATIENT
Start: 2022-09-22 | End: 2022-09-22

## 2022-09-22 RX ADMIN — BUPIVACAINE HYDROCHLORIDE 25 MG: 2.5 INJECTION, SOLUTION EPIDURAL; INFILTRATION; INTRACAUDAL; PERINEURAL at 08:10

## 2022-09-22 RX ADMIN — BUPIVACAINE HYDROCHLORIDE 50 MG: 5 INJECTION, SOLUTION EPIDURAL; INTRACAUDAL; PERINEURAL at 08:10

## 2022-09-22 ASSESSMENT — PAIN SCALES - GENERAL: PAINLEVEL_OUTOF10: 4

## 2022-09-23 ENCOUNTER — APPOINTMENT (OUTPATIENT)
Dept: REHABILITATION | Age: 67
End: 2022-09-23

## 2022-09-26 ENCOUNTER — WALK IN (OUTPATIENT)
Dept: URGENT CARE | Age: 67
End: 2022-09-26

## 2022-09-26 VITALS
TEMPERATURE: 98.2 F | RESPIRATION RATE: 18 BRPM | SYSTOLIC BLOOD PRESSURE: 110 MMHG | DIASTOLIC BLOOD PRESSURE: 61 MMHG | OXYGEN SATURATION: 96 % | HEART RATE: 66 BPM | HEIGHT: 65 IN | WEIGHT: 157 LBS | BODY MASS INDEX: 26.16 KG/M2

## 2022-09-26 DIAGNOSIS — S61.412A LACERATION OF LEFT HAND WITHOUT FOREIGN BODY, INITIAL ENCOUNTER: Primary | ICD-10-CM

## 2022-09-26 ASSESSMENT — ENCOUNTER SYMPTOMS
FEVER: 0
CHILLS: 0

## 2022-09-27 ENCOUNTER — APPOINTMENT (OUTPATIENT)
Dept: REHABILITATION | Age: 67
End: 2022-09-27

## 2022-09-27 RX ORDER — MODAFINIL 200 MG/1
200 TABLET ORAL 2 TIMES DAILY
Qty: 180 TABLET | Refills: 5 | Status: SHIPPED | OUTPATIENT
Start: 2022-09-27 | End: 2022-11-16 | Stop reason: SDUPTHER

## 2022-10-04 ENCOUNTER — OFFICE VISIT (OUTPATIENT)
Dept: REHABILITATION | Age: 67
End: 2022-10-04

## 2022-10-04 DIAGNOSIS — Z96.649 PAIN DUE TO TOTAL HIP REPLACEMENT, SUBSEQUENT ENCOUNTER: Primary | ICD-10-CM

## 2022-10-04 DIAGNOSIS — Z96.641 HISTORY OF TOTAL HIP REPLACEMENT, RIGHT: ICD-10-CM

## 2022-10-04 DIAGNOSIS — T84.84XD PAIN DUE TO TOTAL HIP REPLACEMENT, SUBSEQUENT ENCOUNTER: Primary | ICD-10-CM

## 2022-10-04 PROCEDURE — 97110 THERAPEUTIC EXERCISES: CPT | Performed by: PHYSICAL THERAPY ASSISTANT

## 2022-10-05 ENCOUNTER — OFFICE VISIT (OUTPATIENT)
Dept: FAMILY MEDICINE | Age: 67
End: 2022-10-05

## 2022-10-05 VITALS
HEART RATE: 72 BPM | DIASTOLIC BLOOD PRESSURE: 64 MMHG | BODY MASS INDEX: 26.62 KG/M2 | WEIGHT: 159.8 LBS | SYSTOLIC BLOOD PRESSURE: 108 MMHG | HEIGHT: 65 IN | OXYGEN SATURATION: 99 %

## 2022-10-05 DIAGNOSIS — F51.01 PRIMARY INSOMNIA: ICD-10-CM

## 2022-10-05 DIAGNOSIS — S61.411A LACERATION OF RIGHT HAND WITHOUT FOREIGN BODY, INITIAL ENCOUNTER: ICD-10-CM

## 2022-10-05 DIAGNOSIS — N18.32 STAGE 3B CHRONIC KIDNEY DISEASE (CMD): ICD-10-CM

## 2022-10-05 DIAGNOSIS — Z00.00 MEDICARE ANNUAL WELLNESS VISIT, SUBSEQUENT: Primary | ICD-10-CM

## 2022-10-05 DIAGNOSIS — Z23 NEED FOR VACCINATION: ICD-10-CM

## 2022-10-05 DIAGNOSIS — F31.61 BIPOLAR DISORDER, CURRENT EPISODE MIXED, MILD (CMD): ICD-10-CM

## 2022-10-05 DIAGNOSIS — I10 PRIMARY HYPERTENSION: ICD-10-CM

## 2022-10-05 DIAGNOSIS — J43.1 PANLOBULAR EMPHYSEMA (CMD): ICD-10-CM

## 2022-10-05 PROCEDURE — G0439 PPPS, SUBSEQ VISIT: HCPCS | Performed by: FAMILY MEDICINE

## 2022-10-05 PROCEDURE — 99214 OFFICE O/P EST MOD 30 MIN: CPT | Performed by: FAMILY MEDICINE

## 2022-10-05 PROCEDURE — G0008 ADMIN INFLUENZA VIRUS VAC: HCPCS | Performed by: FAMILY MEDICINE

## 2022-10-05 PROCEDURE — 90662 IIV NO PRSV INCREASED AG IM: CPT | Performed by: FAMILY MEDICINE

## 2022-10-05 PROCEDURE — 96372 THER/PROPH/DIAG INJ SC/IM: CPT | Performed by: FAMILY MEDICINE

## 2022-10-05 RX ORDER — CEPHALEXIN 500 MG/1
500 CAPSULE ORAL 3 TIMES DAILY
Qty: 30 CAPSULE | Refills: 0 | Status: SHIPPED | OUTPATIENT
Start: 2022-10-05 | End: 2022-10-15

## 2022-10-05 ASSESSMENT — PATIENT HEALTH QUESTIONNAIRE - PHQ9
10. IF YOU CHECKED OFF ANY PROBLEMS, HOW DIFFICULT HAVE THESE PROBLEMS MADE IT FOR YOU TO DO YOUR WORK, TAKE CARE OF THINGS AT HOME, OR GET ALONG WITH OTHER PEOPLE: EXTREMELY DIFFICULT
5. POOR APPETITE, WEIGHT LOSS, OR OVEREATING: NEARLY EVERY DAY
SUM OF ALL RESPONSES TO PHQ QUESTIONS 1-9: 24
4. FEELING TIRED OR HAVING LITTLE ENERGY: NEARLY EVERY DAY
9. THOUGHTS THAT YOU WOULD BE BETTER OFF DEAD, OR OF HURTING YOURSELF: NOT AT ALL
2. FEELING DOWN, DEPRESSED OR HOPELESS: NEARLY EVERY DAY
1. LITTLE INTEREST OR PLEASURE IN DOING THINGS: NEARLY EVERY DAY
SUM OF ALL RESPONSES TO PHQ9 QUESTIONS 1 AND 2: 6
3. TROUBLE FALLING OR STAYING ASLEEP OR SLEEPING TOO MUCH: NEARLY EVERY DAY
SUM OF ALL RESPONSES TO PHQ9 QUESTIONS 1 AND 2: 6
6. FEELING BAD ABOUT YOURSELF - OR THAT YOU ARE A FAILURE OR HAVE LET YOURSELF OR YOUR FAMILY DOWN: NEARLY EVERY DAY
8. MOVING OR SPEAKING SO SLOWLY THAT OTHER PEOPLE COULD HAVE NOTICED. OR THE OPPOSITE, BEING SO FIGETY OR RESTLESS THAT YOU HAVE BEEN MOVING AROUND A LOT MORE THAN USUAL: NEARLY EVERY DAY
CLINICAL INTERPRETATION OF PHQ9 SCORE: SEVERE DEPRESSION
7. TROUBLE CONCENTRATING ON THINGS, SUCH AS READING THE NEWSPAPER OR WATCHING TELEVISION: NEARLY EVERY DAY
CLINICAL INTERPRETATION OF PHQ2 SCORE: FURTHER SCREENING NEEDED

## 2022-10-06 ENCOUNTER — APPOINTMENT (OUTPATIENT)
Dept: REHABILITATION | Age: 67
End: 2022-10-06

## 2022-10-10 ENCOUNTER — APPOINTMENT (OUTPATIENT)
Dept: CT IMAGING | Age: 67
End: 2022-10-10
Attending: INTERNAL MEDICINE

## 2022-10-11 ENCOUNTER — OFFICE VISIT (OUTPATIENT)
Dept: REHABILITATION | Age: 67
End: 2022-10-11

## 2022-10-11 DIAGNOSIS — Z96.649 PAIN DUE TO TOTAL HIP REPLACEMENT, SUBSEQUENT ENCOUNTER: ICD-10-CM

## 2022-10-11 DIAGNOSIS — T84.84XD PAIN DUE TO TOTAL HIP REPLACEMENT, SUBSEQUENT ENCOUNTER: ICD-10-CM

## 2022-10-11 DIAGNOSIS — Z96.641 HISTORY OF TOTAL HIP REPLACEMENT, RIGHT: ICD-10-CM

## 2022-10-11 PROCEDURE — 97110 THERAPEUTIC EXERCISES: CPT | Performed by: PHYSICAL THERAPY ASSISTANT

## 2022-10-11 ASSESSMENT — ENCOUNTER SYMPTOMS: PAIN SEVERITY NOW: 0

## 2022-10-12 ENCOUNTER — OFFICE VISIT (OUTPATIENT)
Dept: FAMILY MEDICINE | Age: 67
End: 2022-10-12

## 2022-10-12 VITALS
WEIGHT: 160 LBS | BODY MASS INDEX: 26.66 KG/M2 | DIASTOLIC BLOOD PRESSURE: 60 MMHG | SYSTOLIC BLOOD PRESSURE: 120 MMHG | OXYGEN SATURATION: 99 % | HEIGHT: 65 IN | HEART RATE: 71 BPM | RESPIRATION RATE: 16 BRPM

## 2022-10-12 DIAGNOSIS — S61.412A LACERATION OF LEFT HAND WITHOUT FOREIGN BODY, INITIAL ENCOUNTER: ICD-10-CM

## 2022-10-12 DIAGNOSIS — Z12.4 SCREENING FOR CERVICAL CANCER: ICD-10-CM

## 2022-10-12 DIAGNOSIS — F31.61 BIPOLAR DISORDER, CURRENT EPISODE MIXED, MILD (CMD): Primary | ICD-10-CM

## 2022-10-12 PROCEDURE — 88175 CYTOPATH C/V AUTO FLUID REDO: CPT | Performed by: CLINICAL MEDICAL LABORATORY

## 2022-10-12 PROCEDURE — 99214 OFFICE O/P EST MOD 30 MIN: CPT | Performed by: FAMILY MEDICINE

## 2022-10-12 PROCEDURE — 87624 HPV HI-RISK TYP POOLED RSLT: CPT | Performed by: CLINICAL MEDICAL LABORATORY

## 2022-10-13 ENCOUNTER — IMAGING SERVICES (OUTPATIENT)
Dept: CT IMAGING | Age: 67
End: 2022-10-13
Attending: INTERNAL MEDICINE

## 2022-10-13 DIAGNOSIS — Z87.891 HISTORY OF TOBACCO USE: ICD-10-CM

## 2022-10-13 PROCEDURE — 71271 CT THORAX LUNG CANCER SCR C-: CPT | Performed by: RADIOLOGY

## 2022-10-18 LAB
CASE RPRT: NORMAL
CLINICAL INFO: NORMAL
CYTOLOGY CVX/VAG STUDY: NORMAL
HPV16+18+45 E6+E7MRNA CVX NAA+PROBE: NEGATIVE
Lab: NORMAL
PAP EDUCATIONAL NOTE: NORMAL
SPECIMEN ADEQUACY: NORMAL

## 2022-10-26 DIAGNOSIS — M79.605 BILATERAL LEG PAIN: ICD-10-CM

## 2022-10-26 DIAGNOSIS — F31.12 BIPOLAR AFFECTIVE DISORDER, CURRENTLY MANIC, MODERATE (CMD): ICD-10-CM

## 2022-10-26 DIAGNOSIS — F41.1 GENERALIZED ANXIETY DISORDER: ICD-10-CM

## 2022-10-26 DIAGNOSIS — M79.604 BILATERAL LEG PAIN: ICD-10-CM

## 2022-10-26 RX ORDER — LEVOTHYROXINE SODIUM 0.05 MG/1
TABLET ORAL
Qty: 90 TABLET | Refills: 2 | Status: SHIPPED | OUTPATIENT
Start: 2022-10-26 | End: 2022-11-16 | Stop reason: SDUPTHER

## 2022-10-26 RX ORDER — GABAPENTIN 300 MG/1
CAPSULE ORAL
Qty: 30 CAPSULE | Refills: 3 | Status: SHIPPED | OUTPATIENT
Start: 2022-11-14 | End: 2022-11-16 | Stop reason: SDUPTHER

## 2022-10-28 RX ORDER — LAMOTRIGINE 25 MG/1
TABLET ORAL
Qty: 180 TABLET | Refills: 0 | OUTPATIENT
Start: 2022-10-28

## 2022-11-16 ENCOUNTER — TELEPHONE (OUTPATIENT)
Dept: FAMILY MEDICINE | Age: 67
End: 2022-11-16

## 2022-11-16 ENCOUNTER — OFFICE VISIT (OUTPATIENT)
Dept: FAMILY MEDICINE | Age: 67
End: 2022-11-16

## 2022-11-16 VITALS
SYSTOLIC BLOOD PRESSURE: 127 MMHG | HEIGHT: 65 IN | HEART RATE: 69 BPM | WEIGHT: 160 LBS | DIASTOLIC BLOOD PRESSURE: 72 MMHG | RESPIRATION RATE: 18 BRPM | BODY MASS INDEX: 26.66 KG/M2

## 2022-11-16 DIAGNOSIS — M79.604 BILATERAL LEG PAIN: ICD-10-CM

## 2022-11-16 DIAGNOSIS — K21.9 GASTROESOPHAGEAL REFLUX DISEASE WITHOUT ESOPHAGITIS: ICD-10-CM

## 2022-11-16 DIAGNOSIS — F41.1 GENERALIZED ANXIETY DISORDER: ICD-10-CM

## 2022-11-16 DIAGNOSIS — F31.61 BIPOLAR DISORDER, CURRENT EPISODE MIXED, MILD (CMD): Primary | ICD-10-CM

## 2022-11-16 DIAGNOSIS — M79.89 LEG SWELLING: ICD-10-CM

## 2022-11-16 DIAGNOSIS — M79.605 BILATERAL LEG PAIN: ICD-10-CM

## 2022-11-16 DIAGNOSIS — I10 PRIMARY HYPERTENSION: ICD-10-CM

## 2022-11-16 DIAGNOSIS — F31.12 BIPOLAR AFFECTIVE DISORDER, CURRENTLY MANIC, MODERATE (CMD): ICD-10-CM

## 2022-11-16 DIAGNOSIS — K21.9 GASTROESOPHAGEAL REFLUX DISEASE, UNSPECIFIED WHETHER ESOPHAGITIS PRESENT: ICD-10-CM

## 2022-11-16 DIAGNOSIS — J43.1 PANLOBULAR EMPHYSEMA (CMD): ICD-10-CM

## 2022-11-16 PROCEDURE — 99214 OFFICE O/P EST MOD 30 MIN: CPT | Performed by: FAMILY MEDICINE

## 2022-11-16 RX ORDER — ALBUTEROL SULFATE 90 UG/1
2 AEROSOL, METERED RESPIRATORY (INHALATION) EVERY 4 HOURS PRN
Qty: 1 EACH | Refills: 3 | Status: SHIPPED | OUTPATIENT
Start: 2022-11-16 | End: 2023-05-02 | Stop reason: SDUPTHER

## 2022-11-16 RX ORDER — MODAFINIL 200 MG/1
200 TABLET ORAL 2 TIMES DAILY
Qty: 180 TABLET | Refills: 3 | Status: SHIPPED | OUTPATIENT
Start: 2022-11-16 | End: 2023-03-28

## 2022-11-16 RX ORDER — DULOXETIN HYDROCHLORIDE 60 MG/1
60 CAPSULE, DELAYED RELEASE ORAL 2 TIMES DAILY
Qty: 180 CAPSULE | Refills: 5 | Status: SHIPPED | OUTPATIENT
Start: 2022-11-16

## 2022-11-16 RX ORDER — FUROSEMIDE 40 MG/1
40 TABLET ORAL 2 TIMES DAILY
Qty: 180 TABLET | Refills: 3 | Status: SHIPPED | OUTPATIENT
Start: 2022-11-16 | End: 2023-10-02

## 2022-11-16 RX ORDER — LAMOTRIGINE 25 MG/1
50 TABLET ORAL DAILY
Qty: 180 TABLET | Refills: 1 | Status: SHIPPED | OUTPATIENT
Start: 2022-11-16 | End: 2023-03-27

## 2022-11-16 RX ORDER — QUINAPRIL 20 MG/1
20 TABLET ORAL DAILY
Qty: 90 TABLET | Refills: 3 | Status: SHIPPED | OUTPATIENT
Start: 2022-11-16 | End: 2023-07-05 | Stop reason: ALTCHOICE

## 2022-11-16 RX ORDER — FLUTICASONE PROPIONATE AND SALMETEROL XINAFOATE 115; 21 UG/1; UG/1
2 AEROSOL, METERED RESPIRATORY (INHALATION) 2 TIMES DAILY
Qty: 12 G | Refills: 3 | Status: SHIPPED | OUTPATIENT
Start: 2022-11-16 | End: 2023-05-01

## 2022-11-16 RX ORDER — CETIRIZINE HYDROCHLORIDE 10 MG/1
10 TABLET ORAL DAILY
Qty: 90 TABLET | Refills: 3 | Status: ON HOLD | OUTPATIENT
Start: 2022-11-16 | End: 2023-11-09

## 2022-11-16 RX ORDER — LEVOTHYROXINE SODIUM 0.05 MG/1
50 TABLET ORAL DAILY
Qty: 90 TABLET | Refills: 3 | Status: SHIPPED | OUTPATIENT
Start: 2022-11-16

## 2022-11-16 RX ORDER — ALENDRONATE SODIUM 10 MG/1
10 TABLET ORAL
Qty: 90 TABLET | Refills: 3 | Status: SHIPPED | OUTPATIENT
Start: 2022-11-16 | End: 2023-10-02

## 2022-11-16 RX ORDER — GABAPENTIN 300 MG/1
300 CAPSULE ORAL 2 TIMES DAILY
Qty: 180 CAPSULE | Refills: 3 | Status: ON HOLD | OUTPATIENT
Start: 2022-11-16 | End: 2023-11-09 | Stop reason: HOSPADM

## 2022-11-16 RX ORDER — ESOMEPRAZOLE MAGNESIUM 40 MG/1
40 CAPSULE, DELAYED RELEASE ORAL 2 TIMES DAILY
Qty: 180 CAPSULE | Refills: 3 | Status: SHIPPED | OUTPATIENT
Start: 2022-11-16

## 2022-11-16 RX ORDER — BUPROPION HYDROCHLORIDE 150 MG/1
450 TABLET ORAL DAILY
Qty: 270 TABLET | Refills: 3 | Status: ON HOLD | OUTPATIENT
Start: 2022-11-16 | End: 2023-11-09 | Stop reason: HOSPADM

## 2022-12-05 DIAGNOSIS — M51.36 DDD (DEGENERATIVE DISC DISEASE), LUMBAR: ICD-10-CM

## 2022-12-06 RX ORDER — IBUPROFEN 800 MG/1
800 TABLET ORAL EVERY 8 HOURS PRN
Qty: 90 TABLET | Refills: 2 | Status: SHIPPED | OUTPATIENT
Start: 2022-12-06 | End: 2023-03-02

## 2022-12-22 ENCOUNTER — APPOINTMENT (OUTPATIENT)
Dept: FAMILY MEDICINE | Age: 67
End: 2022-12-22

## 2023-01-10 ENCOUNTER — BEHAVIORAL HEALTH (OUTPATIENT)
Dept: BEHAVIORAL HEALTH | Age: 68
End: 2023-01-10

## 2023-01-10 DIAGNOSIS — F41.1 GENERALIZED ANXIETY DISORDER: ICD-10-CM

## 2023-01-10 DIAGNOSIS — F31.12 BIPOLAR AFFECTIVE DISORDER, CURRENTLY MANIC, MODERATE (CMD): Primary | ICD-10-CM

## 2023-01-10 PROCEDURE — 99214 OFFICE O/P EST MOD 30 MIN: CPT | Performed by: NURSE PRACTITIONER

## 2023-01-10 RX ORDER — SERTRALINE HYDROCHLORIDE 100 MG/1
100 TABLET, FILM COATED ORAL DAILY
Qty: 90 TABLET | Refills: 1 | Status: SHIPPED | OUTPATIENT
Start: 2023-01-10 | End: 2023-07-06 | Stop reason: SDUPTHER

## 2023-02-25 DIAGNOSIS — Z86.39 HISTORY OF HYPOKALEMIA: ICD-10-CM

## 2023-02-27 RX ORDER — POTASSIUM CHLORIDE 1500 MG/1
TABLET, EXTENDED RELEASE ORAL
Qty: 90 TABLET | Refills: 1 | Status: SHIPPED | OUTPATIENT
Start: 2023-02-27 | End: 2023-06-26

## 2023-02-28 ENCOUNTER — TELEPHONE (OUTPATIENT)
Dept: PAIN MANAGEMENT | Age: 68
End: 2023-02-28

## 2023-03-01 DIAGNOSIS — M51.36 DDD (DEGENERATIVE DISC DISEASE), LUMBAR: ICD-10-CM

## 2023-03-02 RX ORDER — IBUPROFEN 800 MG/1
800 TABLET ORAL EVERY 8 HOURS PRN
Qty: 90 TABLET | Refills: 2 | Status: SHIPPED | OUTPATIENT
Start: 2023-03-02 | End: 2023-05-17

## 2023-03-13 ENCOUNTER — APPOINTMENT (OUTPATIENT)
Dept: PAIN MANAGEMENT | Age: 68
End: 2023-03-13
Attending: PAIN MEDICINE

## 2023-03-14 ENCOUNTER — OFFICE VISIT (OUTPATIENT)
Dept: ORTHOPEDICS | Age: 68
End: 2023-03-14

## 2023-03-14 ENCOUNTER — IMAGING SERVICES (OUTPATIENT)
Dept: GENERAL RADIOLOGY | Age: 68
End: 2023-03-14
Attending: ORTHOPAEDIC SURGERY

## 2023-03-14 DIAGNOSIS — M70.61 GREATER TROCHANTERIC BURSITIS OF RIGHT HIP: Primary | ICD-10-CM

## 2023-03-14 DIAGNOSIS — M25.551 RIGHT HIP PAIN: ICD-10-CM

## 2023-03-14 DIAGNOSIS — Z96.643 HISTORY OF BILATERAL HIP REPLACEMENTS: ICD-10-CM

## 2023-03-14 PROCEDURE — 99213 OFFICE O/P EST LOW 20 MIN: CPT | Performed by: ORTHOPAEDIC SURGERY

## 2023-03-14 PROCEDURE — 73521 X-RAY EXAM HIPS BI 2 VIEWS: CPT | Performed by: RADIOLOGY

## 2023-03-14 PROCEDURE — 20610 DRAIN/INJ JOINT/BURSA W/O US: CPT | Performed by: ORTHOPAEDIC SURGERY

## 2023-03-14 RX ORDER — TRIAMCINOLONE ACETONIDE 40 MG/ML
40 INJECTION, SUSPENSION INTRA-ARTICULAR; INTRAMUSCULAR ONCE
Status: COMPLETED | OUTPATIENT
Start: 2023-03-14 | End: 2023-03-14

## 2023-03-14 RX ADMIN — TRIAMCINOLONE ACETONIDE 40 MG: 40 INJECTION, SUSPENSION INTRA-ARTICULAR; INTRAMUSCULAR at 16:32

## 2023-03-20 ENCOUNTER — APPOINTMENT (OUTPATIENT)
Dept: PAIN MANAGEMENT | Age: 68
End: 2023-03-20
Attending: PAIN MEDICINE

## 2023-03-21 ENCOUNTER — HOSPITAL ENCOUNTER (OUTPATIENT)
Dept: PAIN MANAGEMENT | Age: 68
Discharge: HOME | End: 2023-03-21
Attending: PAIN MEDICINE

## 2023-03-21 ENCOUNTER — HOSPITAL ENCOUNTER (OUTPATIENT)
Dept: GENERAL RADIOLOGY | Age: 68
Discharge: HOME | End: 2023-03-21
Attending: PAIN MEDICINE

## 2023-03-21 VITALS
SYSTOLIC BLOOD PRESSURE: 132 MMHG | DIASTOLIC BLOOD PRESSURE: 62 MMHG | OXYGEN SATURATION: 98 % | RESPIRATION RATE: 17 BRPM | HEART RATE: 66 BPM

## 2023-03-21 DIAGNOSIS — M54.16 LUMBAR RADICULOPATHY: ICD-10-CM

## 2023-03-21 DIAGNOSIS — M51.36 DDD (DEGENERATIVE DISC DISEASE), LUMBAR: ICD-10-CM

## 2023-03-21 DIAGNOSIS — M47.812 CERVICAL SPONDYLOSIS WITHOUT MYELOPATHY: Primary | ICD-10-CM

## 2023-03-21 DIAGNOSIS — M54.2 CERVICALGIA: ICD-10-CM

## 2023-03-21 PROCEDURE — 10001479 HB INJECT FACET W/IMAGE 2ND

## 2023-03-21 PROCEDURE — 10001478 HB INJECT FACET W/IMAGE 1ST BILAT

## 2023-03-21 PROCEDURE — 10004187 HB COUNTER-VISIT CENSUS, OUTPATIENT

## 2023-03-21 PROCEDURE — 10002801 HB RX 250 W/O HCPCS: Performed by: PAIN MEDICINE

## 2023-03-21 PROCEDURE — 10005281 FL PACS RECORD NR

## 2023-03-21 PROCEDURE — 10001481 HB INJ FACET W/IMAGE 3 OR >

## 2023-03-21 RX ORDER — BUPIVACAINE HYDROCHLORIDE 5 MG/ML
10 INJECTION, SOLUTION EPIDURAL; INTRACAUDAL ONCE
Status: COMPLETED | OUTPATIENT
Start: 2023-03-21 | End: 2023-03-21

## 2023-03-21 RX ORDER — BUPIVACAINE HYDROCHLORIDE 2.5 MG/ML
30 INJECTION, SOLUTION EPIDURAL; INFILTRATION; INTRACAUDAL ONCE
Status: COMPLETED | OUTPATIENT
Start: 2023-03-21 | End: 2023-03-21

## 2023-03-21 RX ADMIN — BUPIVACAINE HYDROCHLORIDE 50 MG: 5 INJECTION, SOLUTION EPIDURAL; INTRACAUDAL; PERINEURAL at 09:10

## 2023-03-21 RX ADMIN — BUPIVACAINE HYDROCHLORIDE 25 MG: 2.5 INJECTION, SOLUTION EPIDURAL; INFILTRATION; INTRACAUDAL; PERINEURAL at 09:09

## 2023-03-21 ASSESSMENT — PAIN SCALES - GENERAL: PAINLEVEL_OUTOF10: 5

## 2023-03-23 ENCOUNTER — TELEPHONE (OUTPATIENT)
Dept: PAIN MANAGEMENT | Age: 68
End: 2023-03-23

## 2023-03-26 DIAGNOSIS — F41.1 GENERALIZED ANXIETY DISORDER: ICD-10-CM

## 2023-03-26 DIAGNOSIS — F31.12 BIPOLAR AFFECTIVE DISORDER, CURRENTLY MANIC, MODERATE (CMD): ICD-10-CM

## 2023-03-27 ENCOUNTER — OFFICE VISIT (OUTPATIENT)
Dept: REHABILITATION | Age: 68
End: 2023-03-27
Attending: PHYSICIAN ASSISTANT

## 2023-03-27 ENCOUNTER — TELEPHONE (OUTPATIENT)
Dept: PULMONOLOGY | Age: 68
End: 2023-03-27

## 2023-03-27 DIAGNOSIS — M70.61 GREATER TROCHANTERIC BURSITIS OF RIGHT HIP: ICD-10-CM

## 2023-03-27 DIAGNOSIS — Z96.643 HISTORY OF BILATERAL HIP REPLACEMENTS: ICD-10-CM

## 2023-03-27 DIAGNOSIS — M25.551 RIGHT HIP PAIN: ICD-10-CM

## 2023-03-27 PROCEDURE — 97110 THERAPEUTIC EXERCISES: CPT

## 2023-03-27 PROCEDURE — 97162 PT EVAL MOD COMPLEX 30 MIN: CPT

## 2023-03-27 RX ORDER — MODAFINIL 200 MG/1
200 TABLET ORAL 2 TIMES DAILY
Qty: 180 TABLET | Refills: 3 | OUTPATIENT
Start: 2023-03-27

## 2023-03-27 RX ORDER — LAMOTRIGINE 25 MG/1
50 TABLET ORAL DAILY
Qty: 180 TABLET | Refills: 1 | Status: SHIPPED | OUTPATIENT
Start: 2023-03-27 | End: 2023-08-17

## 2023-03-27 ASSESSMENT — MOVEMENT AND STRENGTH ASSESSMENTS
MAKING SHARP TURNS WHILE RUNNING FAST: EXTREME DIFFICULTY OR UNABLE TO PERFORM ACTIVITY
ROLLING OVER IN BED: MODERATE DIFFICULTY
SITTING FOR 1 HOUR: QUITE A BIT OF DIFFICULTY
SQUATTING: EXTREME DIFFICULTY OR UNABLE TO PERFORM ACTIVITY
PERFORMING HEAVY ACTIVITIES AROUND YOUR HOME: EXTREME DIFFICULTY OR UNABLE TO PERFORM ACTIVITY
GOING UP OR DOWN 10 STAIRS (ABOUT 1 FLIGHT OF STAIRS): QUITE A BIT OF DIFFICULTY
WALKING A MILE: EXTREME DIFFICULTY OR UNABLE TO PERFORM ACTIVITY
RUNNING ON UNEVEN GROUND: EXTREME DIFFICULTY OR UNABLE TO PERFORM ACTIVITY
GETTING INTO OR OUT OF THE BATH: MODERATE DIFFICULTY
PERFORMING LIGHT ACTIVITES AROUND YOUR HOME: QUITE A BIT OF DIFFICULTY
WALKING BETWEEN ROOMS: MODERATE DIFFICULTY
ANY OF YOUR USUAL WORK, HOUSEWORK OR SCHOOL ACTIVITIES: QUITE A BIT OF DIFFICULTY
PUTTING ON YOUR SHOES OR SOCKS: QUITE A BIT OF DIFFICULTY
STANDING FOR 1 HOUR: EXTREME DIFFICULTY OR UNABLE TO PERFORM ACTIVITY
HOPPING: EXTREME DIFFICULTY OR UNABLE TO PERFORM ACTIVITY
TOTAL SCORE: 13.75
WALKING 2 BLOCKS: EXTREME DIFFICULTY OR UNABLE TO PERFORM ACTIVITY
GETTING INTO OR OUT OF A CAR: EXTREME DIFFICULTY OR UNABLE TO PERFORM ACTIVITY
LIFTING AN OBJECT, LIKE A BAG OF GROCERIES, FROM THE FLOOR: EXTREME DIFFICULTY OR UNABLE TO PERFORM ACTIVITY
YOUR USUAL HOBBIES, RECREATIONAL OR SPORTING ACTIVIITIES: EXTREME DIFFICULTY OR UNABLE TO PERFORM ACTIVITY
RUNNING ON EVEN GROUND: EXTREME DIFFICULTY OR UNABLE TO PERFORM ACTIVITY

## 2023-03-28 DIAGNOSIS — G47.10 HYPERSOMNOLENCE: Primary | ICD-10-CM

## 2023-03-28 RX ORDER — MODAFINIL 200 MG/1
TABLET ORAL
Qty: 180 TABLET | Refills: 1 | Status: SHIPPED | OUTPATIENT
Start: 2023-03-28 | End: 2023-10-02 | Stop reason: SDUPTHER

## 2023-03-30 ENCOUNTER — OFFICE VISIT (OUTPATIENT)
Dept: REHABILITATION | Age: 68
End: 2023-03-30

## 2023-03-30 DIAGNOSIS — M25.551 RIGHT HIP PAIN: ICD-10-CM

## 2023-03-30 DIAGNOSIS — Z96.643 HISTORY OF BILATERAL HIP REPLACEMENTS: Primary | ICD-10-CM

## 2023-03-30 PROCEDURE — 97110 THERAPEUTIC EXERCISES: CPT

## 2023-03-30 ASSESSMENT — GAIT ASSESSMENTS
TUG TIME: GAIT BELT USED
TUG TIME: 17.55
TUG TIME: NO ASSISTIVE DEVICE

## 2023-04-05 ENCOUNTER — OFFICE VISIT (OUTPATIENT)
Dept: REHABILITATION | Age: 68
End: 2023-04-05

## 2023-04-05 DIAGNOSIS — Z96.643 HISTORY OF BILATERAL HIP REPLACEMENTS: Primary | ICD-10-CM

## 2023-04-05 PROCEDURE — 97110 THERAPEUTIC EXERCISES: CPT | Performed by: PHYSICAL THERAPY ASSISTANT

## 2023-04-11 ENCOUNTER — OFFICE VISIT (OUTPATIENT)
Dept: REHABILITATION | Age: 68
End: 2023-04-11

## 2023-04-11 DIAGNOSIS — Z96.643 HISTORY OF BILATERAL HIP REPLACEMENTS: ICD-10-CM

## 2023-04-11 PROCEDURE — 97110 THERAPEUTIC EXERCISES: CPT | Performed by: PHYSICAL THERAPY ASSISTANT

## 2023-04-19 ENCOUNTER — APPOINTMENT (OUTPATIENT)
Dept: REHABILITATION | Age: 68
End: 2023-04-19

## 2023-04-24 ENCOUNTER — HOSPITAL ENCOUNTER (OUTPATIENT)
Dept: PAIN MANAGEMENT | Age: 68
Discharge: HOME | End: 2023-04-24
Attending: PAIN MEDICINE

## 2023-04-24 ENCOUNTER — HOSPITAL ENCOUNTER (OUTPATIENT)
Dept: GENERAL RADIOLOGY | Age: 68
Discharge: HOME | End: 2023-04-24
Attending: PAIN MEDICINE

## 2023-04-24 VITALS
RESPIRATION RATE: 16 BRPM | DIASTOLIC BLOOD PRESSURE: 61 MMHG | OXYGEN SATURATION: 98 % | SYSTOLIC BLOOD PRESSURE: 128 MMHG | HEART RATE: 72 BPM

## 2023-04-24 DIAGNOSIS — M48.062 LUMBAR STENOSIS WITH NEUROGENIC CLAUDICATION: ICD-10-CM

## 2023-04-24 DIAGNOSIS — M54.16 LUMBAR RADICULOPATHY: ICD-10-CM

## 2023-04-24 DIAGNOSIS — M51.36 DDD (DEGENERATIVE DISC DISEASE), LUMBAR: Primary | ICD-10-CM

## 2023-04-24 PROCEDURE — 10002805 HB CONTRAST AGENT: Performed by: PAIN MEDICINE

## 2023-04-24 PROCEDURE — 10002800 HB RX 250 W HCPCS: Performed by: PAIN MEDICINE

## 2023-04-24 PROCEDURE — 10005267 HB INJECT FORAMEN INITIAL BILATERAL

## 2023-04-24 PROCEDURE — 10005281 FL PACS RECORD NR

## 2023-04-24 PROCEDURE — 10004187 HB COUNTER-VISIT CENSUS, OUTPATIENT

## 2023-04-24 RX ORDER — BETAMETHASONE SODIUM PHOSPHATE AND BETAMETHASONE ACETATE 3; 3 MG/ML; MG/ML
12 INJECTION, SUSPENSION INTRA-ARTICULAR; INTRALESIONAL; INTRAMUSCULAR; SOFT TISSUE ONCE
Status: COMPLETED | OUTPATIENT
Start: 2023-04-24 | End: 2023-04-24

## 2023-04-24 RX ADMIN — IOHEXOL 3 ML: 180 INJECTION INTRAVENOUS at 08:13

## 2023-04-24 RX ADMIN — BETAMETHASONE SODIUM PHOSPHATE AND BETAMETHASONE ACETATE 12 MG: 3; 3 INJECTION, SUSPENSION INTRA-ARTICULAR; INTRALESIONAL; INTRAMUSCULAR at 08:13

## 2023-04-24 ASSESSMENT — PAIN SCALES - GENERAL: PAINLEVEL_OUTOF10: 7

## 2023-04-26 ENCOUNTER — OFFICE VISIT (OUTPATIENT)
Dept: REHABILITATION | Age: 68
End: 2023-04-26

## 2023-04-26 DIAGNOSIS — Z96.643 HISTORY OF BILATERAL HIP REPLACEMENTS: Primary | ICD-10-CM

## 2023-04-26 PROCEDURE — 97110 THERAPEUTIC EXERCISES: CPT | Performed by: PHYSICAL THERAPY ASSISTANT

## 2023-04-26 ASSESSMENT — ENCOUNTER SYMPTOMS: PAIN SEVERITY NOW: 2

## 2023-05-01 DIAGNOSIS — F31.12 BIPOLAR AFFECTIVE DISORDER, CURRENTLY MANIC, MODERATE (CMD): ICD-10-CM

## 2023-05-01 DIAGNOSIS — F41.1 GENERALIZED ANXIETY DISORDER: ICD-10-CM

## 2023-05-01 DIAGNOSIS — J43.1 PANLOBULAR EMPHYSEMA (CMD): ICD-10-CM

## 2023-05-01 RX ORDER — FLUTICASONE PROPIONATE AND SALMETEROL XINAFOATE 115; 21 UG/1; UG/1
AEROSOL, METERED RESPIRATORY (INHALATION)
Qty: 12 EACH | Refills: 3 | Status: SHIPPED | OUTPATIENT
Start: 2023-05-01 | End: 2023-08-17

## 2023-05-02 ENCOUNTER — OFFICE VISIT (OUTPATIENT)
Dept: FAMILY MEDICINE | Age: 68
End: 2023-05-02

## 2023-05-02 ENCOUNTER — LAB SERVICES (OUTPATIENT)
Dept: LAB | Age: 68
End: 2023-05-02

## 2023-05-02 VITALS
WEIGHT: 157.4 LBS | SYSTOLIC BLOOD PRESSURE: 106 MMHG | HEIGHT: 65 IN | HEART RATE: 72 BPM | DIASTOLIC BLOOD PRESSURE: 70 MMHG | RESPIRATION RATE: 18 BRPM | OXYGEN SATURATION: 99 % | BODY MASS INDEX: 26.23 KG/M2

## 2023-05-02 DIAGNOSIS — N18.32 STAGE 3B CHRONIC KIDNEY DISEASE (CMD): ICD-10-CM

## 2023-05-02 DIAGNOSIS — I10 PRIMARY HYPERTENSION: ICD-10-CM

## 2023-05-02 DIAGNOSIS — J43.1 PANLOBULAR EMPHYSEMA (CMD): ICD-10-CM

## 2023-05-02 DIAGNOSIS — I50.32 CHRONIC DIASTOLIC HEART FAILURE (CMD): ICD-10-CM

## 2023-05-02 DIAGNOSIS — F31.61 BIPOLAR DISORDER, CURRENT EPISODE MIXED, MILD (CMD): Primary | ICD-10-CM

## 2023-05-02 DIAGNOSIS — M25.551 RIGHT HIP PAIN: ICD-10-CM

## 2023-05-02 LAB
APPEARANCE UR: CLEAR
BASOPHILS # BLD: 0.1 K/MCL (ref 0–0.3)
BASOPHILS NFR BLD: 1 %
BILIRUB UR QL STRIP: NEGATIVE
COLOR UR: NORMAL
DEPRECATED RDW RBC: 49.5 FL (ref 39–50)
EOSINOPHIL # BLD: 0.1 K/MCL (ref 0–0.5)
EOSINOPHIL NFR BLD: 2 %
ERYTHROCYTE [DISTWIDTH] IN BLOOD: 12.4 % (ref 11–15)
GLUCOSE UR STRIP-MCNC: NEGATIVE MG/DL
HCT VFR BLD CALC: 40.4 % (ref 36–46.5)
HGB BLD-MCNC: 12.6 G/DL (ref 12–15.5)
HGB UR QL STRIP: NEGATIVE
IMM GRANULOCYTES # BLD AUTO: 0 K/MCL (ref 0–0.2)
IMM GRANULOCYTES # BLD: 0 %
KETONES UR STRIP-MCNC: NEGATIVE MG/DL
LEUKOCYTE ESTERASE UR QL STRIP: NEGATIVE
LYMPHOCYTES # BLD: 2.2 K/MCL (ref 1–4)
LYMPHOCYTES NFR BLD: 41 %
MCH RBC QN AUTO: 33.7 PG (ref 26–34)
MCHC RBC AUTO-ENTMCNC: 31.2 G/DL (ref 32–36.5)
MCV RBC AUTO: 108 FL (ref 78–100)
MONOCYTES # BLD: 0.5 K/MCL (ref 0.3–0.9)
MONOCYTES NFR BLD: 9 %
NEUTROPHILS # BLD: 2.4 K/MCL (ref 1.8–7.7)
NEUTROPHILS NFR BLD: 47 %
NITRITE UR QL STRIP: NEGATIVE
NRBC BLD MANUAL-RTO: 0 /100 WBC
PH UR STRIP: 6 [PH] (ref 5–7)
PLATELET # BLD AUTO: 274 K/MCL (ref 140–450)
PROT UR STRIP-MCNC: NEGATIVE MG/DL
RBC # BLD: 3.74 MIL/MCL (ref 4–5.2)
SP GR UR STRIP: 1.01 (ref 1–1.03)
UROBILINOGEN UR STRIP-MCNC: 0.2 MG/DL
WBC # BLD: 5.2 K/MCL (ref 4.2–11)

## 2023-05-02 PROCEDURE — 80061 LIPID PANEL: CPT | Performed by: CLINICAL MEDICAL LABORATORY

## 2023-05-02 PROCEDURE — 85025 COMPLETE CBC W/AUTO DIFF WBC: CPT | Performed by: CLINICAL MEDICAL LABORATORY

## 2023-05-02 PROCEDURE — 99214 OFFICE O/P EST MOD 30 MIN: CPT | Performed by: FAMILY MEDICINE

## 2023-05-02 PROCEDURE — 80053 COMPREHEN METABOLIC PANEL: CPT | Performed by: CLINICAL MEDICAL LABORATORY

## 2023-05-02 PROCEDURE — 36415 COLL VENOUS BLD VENIPUNCTURE: CPT | Performed by: INTERNAL MEDICINE

## 2023-05-02 PROCEDURE — 81003 URINALYSIS AUTO W/O SCOPE: CPT | Performed by: CLINICAL MEDICAL LABORATORY

## 2023-05-02 RX ORDER — ALBUTEROL SULFATE 90 UG/1
2 AEROSOL, METERED RESPIRATORY (INHALATION) EVERY 4 HOURS PRN
Qty: 1 EACH | Refills: 3 | Status: SHIPPED | OUTPATIENT
Start: 2023-05-02 | End: 2023-09-06

## 2023-05-03 ENCOUNTER — OFFICE VISIT (OUTPATIENT)
Dept: REHABILITATION | Age: 68
End: 2023-05-03

## 2023-05-03 DIAGNOSIS — M25.551 RIGHT HIP PAIN: ICD-10-CM

## 2023-05-03 DIAGNOSIS — Z96.643 HISTORY OF BILATERAL HIP REPLACEMENTS: Primary | ICD-10-CM

## 2023-05-03 LAB
ALBUMIN SERPL-MCNC: 3.9 G/DL (ref 3.6–5.1)
ALBUMIN/GLOB SERPL: 1.4 {RATIO} (ref 1–2.4)
ALP SERPL-CCNC: 101 UNITS/L (ref 45–117)
ALT SERPL-CCNC: 23 UNITS/L
ANION GAP SERPL CALC-SCNC: 14 MMOL/L (ref 7–19)
AST SERPL-CCNC: 14 UNITS/L
BILIRUB SERPL-MCNC: 0.3 MG/DL (ref 0.2–1)
BUN SERPL-MCNC: 24 MG/DL (ref 6–20)
BUN/CREAT SERPL: 21 (ref 7–25)
CALCIUM SERPL-MCNC: 9 MG/DL (ref 8.4–10.2)
CHLORIDE SERPL-SCNC: 109 MMOL/L (ref 97–110)
CHOLEST SERPL-MCNC: 241 MG/DL
CHOLEST/HDLC SERPL: 3.4 {RATIO}
CO2 SERPL-SCNC: 25 MMOL/L (ref 21–32)
CREAT SERPL-MCNC: 1.12 MG/DL (ref 0.51–0.95)
FASTING DURATION TIME PATIENT: 8 HOURS (ref 0–999)
GFR SERPLBLD BASED ON 1.73 SQ M-ARVRAT: 54 ML/MIN
GLOBULIN SER-MCNC: 2.7 G/DL (ref 2–4)
GLUCOSE SERPL-MCNC: 98 MG/DL (ref 70–99)
HDLC SERPL-MCNC: 71 MG/DL
LDLC SERPL CALC-MCNC: 136 MG/DL
NONHDLC SERPL-MCNC: 170 MG/DL
POTASSIUM SERPL-SCNC: 4.7 MMOL/L (ref 3.4–5.1)
PROT SERPL-MCNC: 6.6 G/DL (ref 6.4–8.2)
SODIUM SERPL-SCNC: 143 MMOL/L (ref 135–145)
TRIGL SERPL-MCNC: 171 MG/DL

## 2023-05-03 PROCEDURE — 97110 THERAPEUTIC EXERCISES: CPT

## 2023-05-03 RX ORDER — SERTRALINE HYDROCHLORIDE 100 MG/1
TABLET, FILM COATED ORAL
Qty: 90 TABLET | Refills: 1 | OUTPATIENT
Start: 2023-05-03

## 2023-05-09 ENCOUNTER — OFFICE VISIT (OUTPATIENT)
Dept: REHABILITATION | Age: 68
End: 2023-05-09

## 2023-05-09 DIAGNOSIS — Z96.643 HISTORY OF BILATERAL HIP REPLACEMENTS: ICD-10-CM

## 2023-05-09 PROCEDURE — 97110 THERAPEUTIC EXERCISES: CPT | Performed by: PHYSICAL THERAPY ASSISTANT

## 2023-05-09 ASSESSMENT — ENCOUNTER SYMPTOMS: PAIN SEVERITY NOW: 5

## 2023-05-11 ENCOUNTER — OFFICE VISIT (OUTPATIENT)
Dept: ORTHOPEDICS | Age: 68
End: 2023-05-11
Attending: FAMILY MEDICINE

## 2023-05-11 DIAGNOSIS — Z96.643 HISTORY OF TOTAL REPLACEMENT OF BOTH HIP JOINTS: ICD-10-CM

## 2023-05-11 DIAGNOSIS — M24.851 RIGHT SNAPPING HIP: Primary | ICD-10-CM

## 2023-05-11 PROCEDURE — 99213 OFFICE O/P EST LOW 20 MIN: CPT | Performed by: ORTHOPAEDIC SURGERY

## 2023-05-12 ENCOUNTER — TELEPHONE (OUTPATIENT)
Dept: FAMILY MEDICINE | Age: 68
End: 2023-05-12

## 2023-05-17 DIAGNOSIS — M51.36 DDD (DEGENERATIVE DISC DISEASE), LUMBAR: ICD-10-CM

## 2023-05-17 DIAGNOSIS — F41.1 GENERALIZED ANXIETY DISORDER: ICD-10-CM

## 2023-05-17 DIAGNOSIS — F31.12 BIPOLAR AFFECTIVE DISORDER, CURRENTLY MANIC, MODERATE (CMD): ICD-10-CM

## 2023-05-17 RX ORDER — IBUPROFEN 800 MG/1
800 TABLET ORAL EVERY 8 HOURS PRN
Qty: 90 TABLET | Refills: 2 | Status: SHIPPED | OUTPATIENT
Start: 2023-05-17 | End: 2023-08-17

## 2023-05-23 ENCOUNTER — TELEPHONE (OUTPATIENT)
Dept: BEHAVIORAL HEALTH | Age: 68
End: 2023-05-23

## 2023-05-23 RX ORDER — SERTRALINE HYDROCHLORIDE 100 MG/1
TABLET, FILM COATED ORAL
Qty: 90 TABLET | Refills: 1 | OUTPATIENT
Start: 2023-05-23

## 2023-05-25 ENCOUNTER — APPOINTMENT (OUTPATIENT)
Dept: PAIN MANAGEMENT | Age: 68
End: 2023-05-25
Attending: PAIN MEDICINE

## 2023-06-06 ENCOUNTER — HOSPITAL ENCOUNTER (OUTPATIENT)
Dept: GENERAL RADIOLOGY | Age: 68
Discharge: HOME | End: 2023-06-06
Attending: PAIN MEDICINE

## 2023-06-06 ENCOUNTER — HOSPITAL ENCOUNTER (OUTPATIENT)
Dept: PAIN MANAGEMENT | Age: 68
Discharge: HOME | End: 2023-06-06
Attending: PAIN MEDICINE

## 2023-06-06 VITALS
DIASTOLIC BLOOD PRESSURE: 57 MMHG | HEART RATE: 68 BPM | OXYGEN SATURATION: 100 % | RESPIRATION RATE: 18 BRPM | SYSTOLIC BLOOD PRESSURE: 111 MMHG

## 2023-06-06 DIAGNOSIS — M51.36 DDD (DEGENERATIVE DISC DISEASE), LUMBAR: Primary | ICD-10-CM

## 2023-06-06 DIAGNOSIS — M48.062 LUMBAR STENOSIS WITH NEUROGENIC CLAUDICATION: ICD-10-CM

## 2023-06-06 DIAGNOSIS — M54.16 LUMBAR RADICULOPATHY: ICD-10-CM

## 2023-06-06 PROCEDURE — 10004187 HB COUNTER-VISIT CENSUS, OUTPATIENT

## 2023-06-06 PROCEDURE — 10002800 HB RX 250 W HCPCS: Performed by: PAIN MEDICINE

## 2023-06-06 PROCEDURE — 10005267 HB INJECT FORAMEN INITIAL BILATERAL

## 2023-06-06 PROCEDURE — 10002805 HB CONTRAST AGENT: Performed by: PAIN MEDICINE

## 2023-06-06 PROCEDURE — 62327 NJX INTERLAMINAR LMBR/SAC: CPT

## 2023-06-06 RX ORDER — DEXAMETHASONE SODIUM PHOSPHATE 10 MG/ML
20 INJECTION, SOLUTION INTRAMUSCULAR; INTRAVENOUS ONCE
Status: COMPLETED | OUTPATIENT
Start: 2023-06-06 | End: 2023-06-06

## 2023-06-06 RX ADMIN — IOHEXOL 3 ML: 180 INJECTION INTRAVENOUS at 09:00

## 2023-06-06 RX ADMIN — DEXAMETHASONE SODIUM PHOSPHATE 20 MG: 10 INJECTION INTRAMUSCULAR; INTRAVENOUS at 09:00

## 2023-06-06 ASSESSMENT — PAIN SCALES - GENERAL: PAINLEVEL_OUTOF10: 6

## 2023-06-14 DIAGNOSIS — F31.12 BIPOLAR AFFECTIVE DISORDER, CURRENTLY MANIC, MODERATE (CMD): ICD-10-CM

## 2023-06-14 DIAGNOSIS — F41.1 GENERALIZED ANXIETY DISORDER: ICD-10-CM

## 2023-06-16 RX ORDER — SERTRALINE HYDROCHLORIDE 100 MG/1
TABLET, FILM COATED ORAL
Qty: 90 TABLET | Refills: 1 | OUTPATIENT
Start: 2023-06-16

## 2023-06-22 ENCOUNTER — HOSPITAL ENCOUNTER (OUTPATIENT)
Dept: GENERAL RADIOLOGY | Age: 68
Discharge: HOME | End: 2023-06-22
Attending: PAIN MEDICINE

## 2023-06-22 ENCOUNTER — HOSPITAL ENCOUNTER (OUTPATIENT)
Dept: PAIN MANAGEMENT | Age: 68
Discharge: HOME | End: 2023-06-22
Attending: PAIN MEDICINE

## 2023-06-22 VITALS
RESPIRATION RATE: 18 BRPM | OXYGEN SATURATION: 100 % | HEART RATE: 68 BPM | SYSTOLIC BLOOD PRESSURE: 114 MMHG | DIASTOLIC BLOOD PRESSURE: 58 MMHG

## 2023-06-22 DIAGNOSIS — M54.2 CERVICALGIA: ICD-10-CM

## 2023-06-22 DIAGNOSIS — M47.812 CERVICAL SPONDYLOSIS WITHOUT MYELOPATHY: Primary | ICD-10-CM

## 2023-06-22 PROCEDURE — 10001477 HB INJECT FACET W/IMAGE 1ST

## 2023-06-22 PROCEDURE — 10001479 HB INJECT FACET W/IMAGE 2ND

## 2023-06-22 PROCEDURE — 10001481 HB INJ FACET W/IMAGE 3 OR >

## 2023-06-22 PROCEDURE — 10004187 HB COUNTER-VISIT CENSUS, OUTPATIENT

## 2023-06-22 PROCEDURE — 77003 FLUOROGUIDE FOR SPINE INJECT: CPT

## 2023-06-22 PROCEDURE — 10002801 HB RX 250 W/O HCPCS: Performed by: PAIN MEDICINE

## 2023-06-22 RX ORDER — BUPIVACAINE HYDROCHLORIDE 5 MG/ML
10 INJECTION, SOLUTION EPIDURAL; INTRACAUDAL ONCE
Status: COMPLETED | OUTPATIENT
Start: 2023-06-22 | End: 2023-06-22

## 2023-06-22 RX ORDER — BUPIVACAINE HYDROCHLORIDE 2.5 MG/ML
30 INJECTION, SOLUTION EPIDURAL; INFILTRATION; INTRACAUDAL ONCE
Status: COMPLETED | OUTPATIENT
Start: 2023-06-22 | End: 2023-06-22

## 2023-06-22 RX ADMIN — BUPIVACAINE HYDROCHLORIDE 50 MG: 5 INJECTION, SOLUTION EPIDURAL; INTRACAUDAL; PERINEURAL at 09:50

## 2023-06-22 RX ADMIN — BUPIVACAINE HYDROCHLORIDE 25 MG: 2.5 INJECTION, SOLUTION EPIDURAL; INFILTRATION; INTRACAUDAL at 09:50

## 2023-06-22 ASSESSMENT — PAIN SCALES - GENERAL: PAINLEVEL_OUTOF10: 6

## 2023-06-25 DIAGNOSIS — Z86.39 HISTORY OF HYPOKALEMIA: ICD-10-CM

## 2023-06-26 RX ORDER — POTASSIUM CHLORIDE 1500 MG/1
TABLET, EXTENDED RELEASE ORAL
Qty: 90 TABLET | Refills: 1 | Status: SHIPPED | OUTPATIENT
Start: 2023-06-26

## 2023-06-28 ENCOUNTER — HOSPITAL ENCOUNTER (OUTPATIENT)
Dept: MRI IMAGING | Age: 68
Discharge: HOME | End: 2023-06-28
Attending: ORTHOPAEDIC SURGERY

## 2023-06-28 ENCOUNTER — TELEPHONE (OUTPATIENT)
Dept: PAIN MANAGEMENT | Age: 68
End: 2023-06-28

## 2023-06-28 ENCOUNTER — HOSPITAL ENCOUNTER (OUTPATIENT)
Dept: CT IMAGING | Age: 68
Discharge: HOME | End: 2023-06-28
Attending: ORTHOPAEDIC SURGERY

## 2023-06-28 DIAGNOSIS — M24.851 RIGHT SNAPPING HIP: ICD-10-CM

## 2023-06-28 DIAGNOSIS — Z96.643 HISTORY OF TOTAL REPLACEMENT OF BOTH HIP JOINTS: ICD-10-CM

## 2023-06-28 PROCEDURE — G1004 CDSM NDSC: HCPCS

## 2023-06-28 PROCEDURE — G1004 CDSM NDSC: HCPCS | Performed by: RADIOLOGY

## 2023-06-28 PROCEDURE — 72192 CT PELVIS W/O DYE: CPT | Performed by: RADIOLOGY

## 2023-06-28 PROCEDURE — 73721 MRI JNT OF LWR EXTRE W/O DYE: CPT

## 2023-06-28 PROCEDURE — 72192 CT PELVIS W/O DYE: CPT

## 2023-06-28 PROCEDURE — 73721 MRI JNT OF LWR EXTRE W/O DYE: CPT | Performed by: RADIOLOGY

## 2023-07-05 ENCOUNTER — TELEPHONE (OUTPATIENT)
Dept: FAMILY MEDICINE | Age: 68
End: 2023-07-05

## 2023-07-05 DIAGNOSIS — F41.1 GENERALIZED ANXIETY DISORDER: ICD-10-CM

## 2023-07-05 DIAGNOSIS — F31.12 BIPOLAR AFFECTIVE DISORDER, CURRENTLY MANIC, MODERATE (CMD): ICD-10-CM

## 2023-07-05 RX ORDER — QUINAPRIL 20 MG/1
20 TABLET ORAL DAILY
Qty: 90 TABLET | Refills: 3 | Status: CANCELLED | OUTPATIENT
Start: 2023-07-05

## 2023-07-05 RX ORDER — LISINOPRIL 20 MG/1
20 TABLET ORAL DAILY
Qty: 90 TABLET | Refills: 3 | Status: SHIPPED | OUTPATIENT
Start: 2023-07-05

## 2023-07-06 ENCOUNTER — BEHAVIORAL HEALTH (OUTPATIENT)
Dept: BEHAVIORAL HEALTH | Age: 68
End: 2023-07-06

## 2023-07-06 DIAGNOSIS — F31.12 BIPOLAR AFFECTIVE DISORDER, CURRENTLY MANIC, MODERATE (CMD): Primary | ICD-10-CM

## 2023-07-06 DIAGNOSIS — F41.1 GENERALIZED ANXIETY DISORDER: ICD-10-CM

## 2023-07-06 PROCEDURE — 99214 OFFICE O/P EST MOD 30 MIN: CPT | Performed by: NURSE PRACTITIONER

## 2023-07-06 RX ORDER — SERTRALINE HYDROCHLORIDE 100 MG/1
100 TABLET, FILM COATED ORAL DAILY
Qty: 90 TABLET | Refills: 1 | Status: ON HOLD | OUTPATIENT
Start: 2023-07-06 | End: 2023-11-09 | Stop reason: HOSPADM

## 2023-07-07 ENCOUNTER — TELEPHONE (OUTPATIENT)
Dept: ORTHOPEDICS | Age: 68
End: 2023-07-07

## 2023-07-09 RX ORDER — SERTRALINE HYDROCHLORIDE 100 MG/1
TABLET, FILM COATED ORAL
Qty: 90 TABLET | Refills: 1 | OUTPATIENT
Start: 2023-07-09

## 2023-07-12 ENCOUNTER — TELEPHONE (OUTPATIENT)
Dept: SCHEDULING | Age: 68
End: 2023-07-12

## 2023-07-13 ENCOUNTER — TELEPHONE (OUTPATIENT)
Dept: OTHER | Age: 68
End: 2023-07-13

## 2023-08-17 DIAGNOSIS — J43.1 PANLOBULAR EMPHYSEMA (CMD): ICD-10-CM

## 2023-08-17 DIAGNOSIS — F31.12 BIPOLAR AFFECTIVE DISORDER, CURRENTLY MANIC, MODERATE (CMD): ICD-10-CM

## 2023-08-17 DIAGNOSIS — M51.36 DDD (DEGENERATIVE DISC DISEASE), LUMBAR: ICD-10-CM

## 2023-08-17 DIAGNOSIS — F41.1 GENERALIZED ANXIETY DISORDER: ICD-10-CM

## 2023-08-17 RX ORDER — LAMOTRIGINE 25 MG/1
50 TABLET ORAL DAILY
Qty: 180 TABLET | Refills: 1 | Status: SHIPPED | OUTPATIENT
Start: 2023-08-17

## 2023-08-17 RX ORDER — IBUPROFEN 800 MG/1
800 TABLET ORAL EVERY 8 HOURS PRN
Qty: 90 TABLET | Refills: 2 | Status: SHIPPED | OUTPATIENT
Start: 2023-08-17

## 2023-08-17 RX ORDER — FLUTICASONE PROPIONATE AND SALMETEROL XINAFOATE 115; 21 UG/1; UG/1
2 AEROSOL, METERED RESPIRATORY (INHALATION) 2 TIMES DAILY
Qty: 12 EACH | Refills: 3 | Status: SHIPPED | OUTPATIENT
Start: 2023-08-17

## 2023-08-24 ENCOUNTER — HOSPITAL ENCOUNTER (OUTPATIENT)
Dept: PAIN MANAGEMENT | Age: 68
Discharge: HOME | End: 2023-08-24
Attending: PAIN MEDICINE

## 2023-08-24 ENCOUNTER — HOSPITAL ENCOUNTER (OUTPATIENT)
Dept: GENERAL RADIOLOGY | Age: 68
Discharge: HOME | End: 2023-08-24
Attending: PAIN MEDICINE

## 2023-08-24 VITALS
OXYGEN SATURATION: 97 % | SYSTOLIC BLOOD PRESSURE: 118 MMHG | DIASTOLIC BLOOD PRESSURE: 57 MMHG | HEART RATE: 61 BPM | RESPIRATION RATE: 16 BRPM

## 2023-08-24 DIAGNOSIS — M54.2 CERVICALGIA: Primary | ICD-10-CM

## 2023-08-24 DIAGNOSIS — M47.812 CERVICAL SPONDYLOSIS WITHOUT MYELOPATHY: ICD-10-CM

## 2023-08-24 PROCEDURE — 10004482 HB NEUROLYSIS FACET JOINT EA ADDL LEVEL

## 2023-08-24 PROCEDURE — 10002800 HB RX 250 W HCPCS: Performed by: PAIN MEDICINE

## 2023-08-24 PROCEDURE — 10002807 HB RX 258: Performed by: PAIN MEDICINE

## 2023-08-24 PROCEDURE — 10004481 HB NEUROLYSIS FACET JOINT  W/IMAGING

## 2023-08-24 PROCEDURE — 77003 FLUOROGUIDE FOR SPINE INJECT: CPT

## 2023-08-24 PROCEDURE — 10004187 HB COUNTER-VISIT CENSUS, OUTPATIENT

## 2023-08-24 PROCEDURE — 10002801 HB RX 250 W/O HCPCS: Performed by: PAIN MEDICINE

## 2023-08-24 PROCEDURE — 99152 MOD SED SAME PHYS/QHP 5/>YRS: CPT

## 2023-08-24 PROCEDURE — 99153 MOD SED SAME PHYS/QHP EA: CPT

## 2023-08-24 RX ORDER — SODIUM CHLORIDE 9 MG/ML
INJECTION, SOLUTION INTRAVENOUS CONTINUOUS
Status: DISCONTINUED | OUTPATIENT
Start: 2023-08-24 | End: 2023-08-26 | Stop reason: HOSPADM

## 2023-08-24 RX ORDER — DEXAMETHASONE SODIUM PHOSPHATE 10 MG/ML
INJECTION, SOLUTION INTRAMUSCULAR; INTRAVENOUS PRN
Status: COMPLETED | OUTPATIENT
Start: 2023-08-24 | End: 2023-08-24

## 2023-08-24 RX ORDER — MIDAZOLAM HYDROCHLORIDE 1 MG/ML
INJECTION, SOLUTION INTRAMUSCULAR; INTRAVENOUS PRN
Status: COMPLETED | OUTPATIENT
Start: 2023-08-24 | End: 2023-08-24

## 2023-08-24 RX ORDER — HYDROCODONE BITARTRATE AND ACETAMINOPHEN 5; 325 MG/1; MG/1
1 TABLET ORAL
Status: DISCONTINUED | OUTPATIENT
Start: 2023-08-24 | End: 2023-08-26 | Stop reason: HOSPADM

## 2023-08-24 RX ORDER — MIDAZOLAM HYDROCHLORIDE 1 MG/ML
2 INJECTION, SOLUTION INTRAMUSCULAR; INTRAVENOUS EVERY 5 MIN PRN
Status: DISCONTINUED | OUTPATIENT
Start: 2023-08-24 | End: 2023-08-26 | Stop reason: HOSPADM

## 2023-08-24 RX ORDER — LIDOCAINE HYDROCHLORIDE 10 MG/ML
INJECTION, SOLUTION EPIDURAL; INFILTRATION; INTRACAUDAL; PERINEURAL PRN
Status: COMPLETED | OUTPATIENT
Start: 2023-08-24 | End: 2023-08-24

## 2023-08-24 RX ADMIN — SODIUM CHLORIDE: 9 INJECTION, SOLUTION INTRAVENOUS at 08:19

## 2023-08-24 RX ADMIN — LIDOCAINE HYDROCHLORIDE 600 MG: 10 INJECTION, SOLUTION EPIDURAL; INFILTRATION; INTRACAUDAL; PERINEURAL at 08:19

## 2023-08-24 RX ADMIN — FENTANYL CITRATE 25 MCG: 50 INJECTION, SOLUTION INTRAMUSCULAR; INTRAVENOUS at 08:17

## 2023-08-24 RX ADMIN — DEXAMETHASONE SODIUM PHOSPHATE 20 MG: 10 INJECTION, SOLUTION INTRAMUSCULAR; INTRAVENOUS at 08:24

## 2023-08-24 RX ADMIN — FENTANYL CITRATE 25 MCG: 50 INJECTION, SOLUTION INTRAMUSCULAR; INTRAVENOUS at 08:27

## 2023-08-24 RX ADMIN — MIDAZOLAM HYDROCHLORIDE 0.5 MG: 1 INJECTION, SOLUTION INTRAMUSCULAR; INTRAVENOUS at 08:27

## 2023-08-24 RX ADMIN — MIDAZOLAM HYDROCHLORIDE 0.5 MG: 1 INJECTION, SOLUTION INTRAMUSCULAR; INTRAVENOUS at 08:17

## 2023-08-24 ASSESSMENT — PAIN SCALES - GENERAL
PAINLEVEL_OUTOF10: 0
PAINLEVEL_OUTOF10: 0
PAINLEVEL_OUTOF10: 4
PAINLEVEL_OUTOF10: 4
PAINLEVEL_OUTOF10: 0
PAINLEVEL_OUTOF10: 3
PAINLEVEL_OUTOF10: 1
PAINLEVEL_OUTOF10: 1
PAINLEVEL_OUTOF10: 3

## 2023-08-24 ASSESSMENT — LIFESTYLE VARIABLES: SMOKING_HISTORY: NO

## 2023-09-05 ENCOUNTER — APPOINTMENT (OUTPATIENT)
Dept: PAIN MANAGEMENT | Age: 68
End: 2023-09-05
Attending: PAIN MEDICINE

## 2023-09-06 DIAGNOSIS — J43.1 PANLOBULAR EMPHYSEMA (CMD): ICD-10-CM

## 2023-09-06 RX ORDER — ALBUTEROL SULFATE 90 UG/1
2 AEROSOL, METERED RESPIRATORY (INHALATION) EVERY 4 HOURS PRN
Qty: 8.5 EACH | Refills: 3 | Status: SHIPPED | OUTPATIENT
Start: 2023-09-06

## 2023-09-18 ENCOUNTER — TELEPHONE (OUTPATIENT)
Dept: OTHER | Age: 68
End: 2023-09-18

## 2023-10-02 ENCOUNTER — TELEPHONE (OUTPATIENT)
Dept: OTHER | Age: 68
End: 2023-10-02

## 2023-10-02 DIAGNOSIS — G47.10 HYPERSOMNOLENCE: ICD-10-CM

## 2023-10-02 DIAGNOSIS — M79.89 LEG SWELLING: ICD-10-CM

## 2023-10-02 RX ORDER — FUROSEMIDE 40 MG/1
40 TABLET ORAL 2 TIMES DAILY
Qty: 180 TABLET | Refills: 3 | Status: SHIPPED | OUTPATIENT
Start: 2023-10-02

## 2023-10-02 RX ORDER — MODAFINIL 200 MG/1
TABLET ORAL
Qty: 180 TABLET | OUTPATIENT
Start: 2023-10-02

## 2023-10-02 RX ORDER — ALENDRONATE SODIUM 10 MG/1
10 TABLET ORAL
Qty: 90 TABLET | Refills: 3 | Status: SHIPPED | OUTPATIENT
Start: 2023-10-02

## 2023-10-02 RX ORDER — MODAFINIL 200 MG/1
200 TABLET ORAL 2 TIMES DAILY
Qty: 180 TABLET | Refills: 1 | Status: SHIPPED | OUTPATIENT
Start: 2023-10-02 | End: 2023-10-11 | Stop reason: SDUPTHER

## 2023-10-05 ENCOUNTER — TELEPHONE (OUTPATIENT)
Dept: PAIN MANAGEMENT | Age: 68
End: 2023-10-05

## 2023-10-05 ENCOUNTER — HOSPITAL ENCOUNTER (OUTPATIENT)
Dept: PAIN MANAGEMENT | Age: 68
Discharge: HOME | End: 2023-10-05
Attending: PAIN MEDICINE

## 2023-10-05 VITALS
DIASTOLIC BLOOD PRESSURE: 60 MMHG | HEART RATE: 68 BPM | RESPIRATION RATE: 20 BRPM | OXYGEN SATURATION: 99 % | SYSTOLIC BLOOD PRESSURE: 128 MMHG

## 2023-10-05 DIAGNOSIS — M48.062 LUMBAR STENOSIS WITH NEUROGENIC CLAUDICATION: ICD-10-CM

## 2023-10-05 DIAGNOSIS — M51.36 DDD (DEGENERATIVE DISC DISEASE), LUMBAR: ICD-10-CM

## 2023-10-05 DIAGNOSIS — M54.2 CERVICALGIA: ICD-10-CM

## 2023-10-05 DIAGNOSIS — M54.16 LUMBAR RADICULOPATHY: ICD-10-CM

## 2023-10-05 DIAGNOSIS — M47.812 CERVICAL SPONDYLOSIS WITHOUT MYELOPATHY: Primary | ICD-10-CM

## 2023-10-05 PROCEDURE — 99211 OFF/OP EST MAY X REQ PHY/QHP: CPT

## 2023-10-05 PROCEDURE — 10004187 HB COUNTER-VISIT CENSUS, OUTPATIENT

## 2023-10-05 ASSESSMENT — PAIN SCALES - GENERAL: PAINLEVEL_OUTOF10: 5

## 2023-10-11 ENCOUNTER — LAB SERVICES (OUTPATIENT)
Dept: LAB | Age: 68
End: 2023-10-11

## 2023-10-11 ENCOUNTER — OFFICE VISIT (OUTPATIENT)
Dept: FAMILY MEDICINE | Age: 68
End: 2023-10-11

## 2023-10-11 ENCOUNTER — OFFICE VISIT (OUTPATIENT)
Dept: PULMONOLOGY | Age: 68
End: 2023-10-11
Attending: INTERNAL MEDICINE

## 2023-10-11 VITALS
WEIGHT: 157.4 LBS | RESPIRATION RATE: 16 BRPM | BODY MASS INDEX: 26.23 KG/M2 | HEIGHT: 65 IN | DIASTOLIC BLOOD PRESSURE: 67 MMHG | HEART RATE: 62 BPM | SYSTOLIC BLOOD PRESSURE: 107 MMHG

## 2023-10-11 VITALS
HEART RATE: 68 BPM | HEIGHT: 65 IN | RESPIRATION RATE: 18 BRPM | OXYGEN SATURATION: 100 % | DIASTOLIC BLOOD PRESSURE: 74 MMHG | BODY MASS INDEX: 26.24 KG/M2 | WEIGHT: 157.5 LBS | SYSTOLIC BLOOD PRESSURE: 122 MMHG

## 2023-10-11 DIAGNOSIS — I10 PRIMARY HYPERTENSION: ICD-10-CM

## 2023-10-11 DIAGNOSIS — M77.31 CALCANEAL SPUR OF RIGHT FOOT: ICD-10-CM

## 2023-10-11 DIAGNOSIS — G47.10 HYPERSOMNOLENCE: ICD-10-CM

## 2023-10-11 DIAGNOSIS — Z87.891 HISTORY OF TOBACCO USE: ICD-10-CM

## 2023-10-11 DIAGNOSIS — M54.16 LUMBAR RADICULOPATHY: ICD-10-CM

## 2023-10-11 DIAGNOSIS — Z00.00 MEDICARE ANNUAL WELLNESS VISIT, SUBSEQUENT: Primary | ICD-10-CM

## 2023-10-11 DIAGNOSIS — Z12.31 VISIT FOR SCREENING MAMMOGRAM: ICD-10-CM

## 2023-10-11 DIAGNOSIS — F31.61 BIPOLAR DISORDER, CURRENT EPISODE MIXED, MILD (CMD): ICD-10-CM

## 2023-10-11 DIAGNOSIS — F51.01 PRIMARY INSOMNIA: ICD-10-CM

## 2023-10-11 DIAGNOSIS — Z23 NEED FOR VACCINATION: ICD-10-CM

## 2023-10-11 DIAGNOSIS — Z87.891 HISTORY OF TOBACCO USE: Primary | ICD-10-CM

## 2023-10-11 DIAGNOSIS — K21.9 GASTROESOPHAGEAL REFLUX DISEASE WITHOUT ESOPHAGITIS: ICD-10-CM

## 2023-10-11 LAB
ALBUMIN SERPL-MCNC: 3.8 G/DL (ref 3.6–5.1)
ALBUMIN/GLOB SERPL: 1.3 {RATIO} (ref 1–2.4)
ALP SERPL-CCNC: 99 UNITS/L (ref 45–117)
ALT SERPL-CCNC: 18 UNITS/L
ANION GAP SERPL CALC-SCNC: 8 MMOL/L (ref 7–19)
AST SERPL-CCNC: 9 UNITS/L
BASOPHILS # BLD: 0.1 K/MCL (ref 0–0.3)
BASOPHILS NFR BLD: 1 %
BILIRUB SERPL-MCNC: 0.4 MG/DL (ref 0.2–1)
BUN SERPL-MCNC: 24 MG/DL (ref 6–20)
BUN/CREAT SERPL: 19 (ref 7–25)
CALCIUM SERPL-MCNC: 9.4 MG/DL (ref 8.4–10.2)
CHLORIDE SERPL-SCNC: 116 MMOL/L (ref 97–110)
CO2 SERPL-SCNC: 24 MMOL/L (ref 21–32)
CREAT SERPL-MCNC: 1.26 MG/DL (ref 0.51–0.95)
DEPRECATED RDW RBC: 50.3 FL (ref 39–50)
EGFRCR SERPLBLD CKD-EPI 2021: 46 ML/MIN/{1.73_M2}
EOSINOPHIL # BLD: 0.1 K/MCL (ref 0–0.5)
EOSINOPHIL NFR BLD: 3 %
ERYTHROCYTE [DISTWIDTH] IN BLOOD: 12.2 % (ref 11–15)
FASTING DURATION TIME PATIENT: ABNORMAL H
GLOBULIN SER-MCNC: 3 G/DL (ref 2–4)
GLUCOSE SERPL-MCNC: 87 MG/DL (ref 70–99)
HCT VFR BLD CALC: 39.6 % (ref 36–46.5)
HGB BLD-MCNC: 12.3 G/DL (ref 12–15.5)
IMM GRANULOCYTES # BLD AUTO: 0 K/MCL (ref 0–0.2)
IMM GRANULOCYTES # BLD: 1 %
LYMPHOCYTES # BLD: 1.7 K/MCL (ref 1–4)
LYMPHOCYTES NFR BLD: 36 %
MCH RBC QN AUTO: 34.3 PG (ref 26–34)
MCHC RBC AUTO-ENTMCNC: 31.1 G/DL (ref 32–36.5)
MCV RBC AUTO: 110.3 FL (ref 78–100)
MONOCYTES # BLD: 0.4 K/MCL (ref 0.3–0.9)
MONOCYTES NFR BLD: 8 %
NEUTROPHILS # BLD: 2.4 K/MCL (ref 1.8–7.7)
NEUTROPHILS NFR BLD: 51 %
NRBC BLD MANUAL-RTO: 0 /100 WBC
PLATELET # BLD AUTO: 263 K/MCL (ref 140–450)
POTASSIUM SERPL-SCNC: 4.8 MMOL/L (ref 3.4–5.1)
PROT SERPL-MCNC: 6.8 G/DL (ref 6.4–8.2)
RBC # BLD: 3.59 MIL/MCL (ref 4–5.2)
SODIUM SERPL-SCNC: 143 MMOL/L (ref 135–145)
WBC # BLD: 4.7 K/MCL (ref 4.2–11)

## 2023-10-11 PROCEDURE — 99215 OFFICE O/P EST HI 40 MIN: CPT | Performed by: FAMILY MEDICINE

## 2023-10-11 PROCEDURE — 85025 COMPLETE CBC W/AUTO DIFF WBC: CPT | Performed by: CLINICAL MEDICAL LABORATORY

## 2023-10-11 PROCEDURE — 80053 COMPREHEN METABOLIC PANEL: CPT | Performed by: CLINICAL MEDICAL LABORATORY

## 2023-10-11 PROCEDURE — 90662 IIV NO PRSV INCREASED AG IM: CPT | Performed by: FAMILY MEDICINE

## 2023-10-11 PROCEDURE — 36415 COLL VENOUS BLD VENIPUNCTURE: CPT | Performed by: INTERNAL MEDICINE

## 2023-10-11 PROCEDURE — G0439 PPPS, SUBSEQ VISIT: HCPCS | Performed by: FAMILY MEDICINE

## 2023-10-11 PROCEDURE — 99211 OFF/OP EST MAY X REQ PHY/QHP: CPT

## 2023-10-11 PROCEDURE — G0008 ADMIN INFLUENZA VIRUS VAC: HCPCS | Performed by: FAMILY MEDICINE

## 2023-10-11 RX ORDER — ARIPIPRAZOLE 5 MG/1
5 TABLET ORAL DAILY
Qty: 90 TABLET | Refills: 3 | Status: ON HOLD | OUTPATIENT
Start: 2023-10-11 | End: 2023-11-09 | Stop reason: HOSPADM

## 2023-10-11 RX ORDER — MODAFINIL 200 MG/1
200 TABLET ORAL 2 TIMES DAILY
Qty: 180 TABLET | Refills: 5 | Status: ON HOLD | OUTPATIENT
Start: 2023-10-11 | End: 2023-11-09

## 2023-10-11 ASSESSMENT — PATIENT HEALTH QUESTIONNAIRE - PHQ9
SUM OF ALL RESPONSES TO PHQ9 QUESTIONS 1 AND 2: 0
1. LITTLE INTEREST OR PLEASURE IN DOING THINGS: NOT AT ALL
2. FEELING DOWN, DEPRESSED OR HOPELESS: NOT AT ALL
CLINICAL INTERPRETATION OF PHQ2 SCORE: NO FURTHER SCREENING NEEDED
SUM OF ALL RESPONSES TO PHQ9 QUESTIONS 1 AND 2: 0

## 2023-10-17 ENCOUNTER — TELEPHONE (OUTPATIENT)
Dept: OTHER | Age: 68
End: 2023-10-17

## 2023-10-18 ENCOUNTER — OFFICE VISIT (OUTPATIENT)
Dept: REHABILITATION | Age: 68
End: 2023-10-18
Attending: PAIN MEDICINE

## 2023-10-18 DIAGNOSIS — M47.812 CERVICAL SPONDYLOSIS WITHOUT MYELOPATHY: ICD-10-CM

## 2023-10-18 DIAGNOSIS — M54.2 CERVICALGIA: ICD-10-CM

## 2023-10-18 PROCEDURE — 97162 PT EVAL MOD COMPLEX 30 MIN: CPT | Performed by: PHYSICAL THERAPIST

## 2023-10-18 PROCEDURE — 97110 THERAPEUTIC EXERCISES: CPT | Performed by: PHYSICAL THERAPIST

## 2023-10-18 ASSESSMENT — ENCOUNTER SYMPTOMS
PAIN SCALE AT LOWEST: 3
PAIN SCALE AT HIGHEST: 10
PAIN LOCATION: BILATERAL UPPER TRAPEZIUS
QUALITY: SORE
PAIN FREQUENCY: CONSTANT
ALLEVIATING FACTORS: ICE
PAIN SEVERITY NOW: 5
ALLEVIATING FACTORS: HEAT
QUALITY: ACHE

## 2023-10-20 ENCOUNTER — OFFICE VISIT (OUTPATIENT)
Dept: REHABILITATION | Age: 68
End: 2023-10-20

## 2023-10-20 DIAGNOSIS — M47.812 CERVICAL SPONDYLOSIS WITHOUT MYELOPATHY: Primary | ICD-10-CM

## 2023-10-20 DIAGNOSIS — M54.2 CERVICALGIA: ICD-10-CM

## 2023-10-20 PROCEDURE — 97140 MANUAL THERAPY 1/> REGIONS: CPT | Performed by: PHYSICAL THERAPIST

## 2023-10-20 PROCEDURE — 97110 THERAPEUTIC EXERCISES: CPT | Performed by: PHYSICAL THERAPIST

## 2023-10-20 ASSESSMENT — ENCOUNTER SYMPTOMS: PAIN SEVERITY NOW: 3

## 2023-10-22 DIAGNOSIS — N18.32 STAGE 3B CHRONIC KIDNEY DISEASE (CMD): Primary | ICD-10-CM

## 2023-10-23 ENCOUNTER — OFFICE VISIT (OUTPATIENT)
Dept: REHABILITATION | Age: 68
End: 2023-10-23

## 2023-10-23 DIAGNOSIS — M54.2 CERVICALGIA: ICD-10-CM

## 2023-10-23 DIAGNOSIS — M47.812 CERVICAL SPONDYLOSIS WITHOUT MYELOPATHY: Primary | ICD-10-CM

## 2023-10-23 PROCEDURE — 97140 MANUAL THERAPY 1/> REGIONS: CPT | Performed by: PHYSICAL THERAPIST

## 2023-10-23 PROCEDURE — 97110 THERAPEUTIC EXERCISES: CPT | Performed by: PHYSICAL THERAPIST

## 2023-10-23 ASSESSMENT — ENCOUNTER SYMPTOMS: PAIN SEVERITY NOW: 4

## 2023-10-24 ENCOUNTER — HOSPITAL ENCOUNTER (OUTPATIENT)
Dept: MAMMOGRAPHY | Age: 68
Discharge: HOME | End: 2023-10-24
Attending: FAMILY MEDICINE

## 2023-10-24 DIAGNOSIS — Z12.31 VISIT FOR SCREENING MAMMOGRAM: ICD-10-CM

## 2023-10-24 PROBLEM — E03.9 HYPOTHYROIDISM: Status: ACTIVE | Noted: 2023-10-24

## 2023-10-24 PROCEDURE — 77063 BREAST TOMOSYNTHESIS BI: CPT | Performed by: RADIOLOGY

## 2023-10-24 PROCEDURE — 77067 SCR MAMMO BI INCL CAD: CPT

## 2023-10-24 PROCEDURE — 77067 SCR MAMMO BI INCL CAD: CPT | Performed by: RADIOLOGY

## 2023-10-25 ENCOUNTER — OFFICE VISIT (OUTPATIENT)
Dept: REHABILITATION | Age: 68
End: 2023-10-25

## 2023-10-25 DIAGNOSIS — M54.2 CERVICALGIA: ICD-10-CM

## 2023-10-25 DIAGNOSIS — M47.812 CERVICAL SPONDYLOSIS WITHOUT MYELOPATHY: Primary | ICD-10-CM

## 2023-10-25 PROBLEM — M81.0 OSTEOPOROSIS: Status: ACTIVE | Noted: 2023-10-25

## 2023-10-25 PROCEDURE — 97110 THERAPEUTIC EXERCISES: CPT | Performed by: PHYSICAL THERAPIST

## 2023-10-26 ENCOUNTER — HOSPITAL ENCOUNTER (OUTPATIENT)
Dept: PAIN MANAGEMENT | Age: 68
Discharge: HOME | End: 2023-10-26
Attending: PAIN MEDICINE

## 2023-10-26 ENCOUNTER — HOSPITAL ENCOUNTER (OUTPATIENT)
Dept: GENERAL RADIOLOGY | Age: 68
Discharge: HOME | End: 2023-10-26
Attending: PAIN MEDICINE

## 2023-10-26 ENCOUNTER — V-VISIT (OUTPATIENT)
Dept: POST ACUTE CARE | Age: 68
End: 2023-10-26

## 2023-10-26 VITALS
SYSTOLIC BLOOD PRESSURE: 137 MMHG | DIASTOLIC BLOOD PRESSURE: 67 MMHG | OXYGEN SATURATION: 99 % | HEART RATE: 67 BPM | RESPIRATION RATE: 17 BRPM

## 2023-10-26 DIAGNOSIS — Z98.84 HISTORY OF GASTRIC BYPASS: ICD-10-CM

## 2023-10-26 DIAGNOSIS — Z86.73 HISTORY OF TIA (TRANSIENT ISCHEMIC ATTACK): ICD-10-CM

## 2023-10-26 DIAGNOSIS — I10 PRIMARY HYPERTENSION: ICD-10-CM

## 2023-10-26 DIAGNOSIS — M54.16 LUMBAR RADICULOPATHY: ICD-10-CM

## 2023-10-26 DIAGNOSIS — G62.9 NEUROPATHY: ICD-10-CM

## 2023-10-26 DIAGNOSIS — F51.01 PRIMARY INSOMNIA: ICD-10-CM

## 2023-10-26 DIAGNOSIS — F31.61 BIPOLAR DISORDER, CURRENT EPISODE MIXED, MILD (CMD): ICD-10-CM

## 2023-10-26 DIAGNOSIS — R25.1 OCCASIONAL TREMORS: ICD-10-CM

## 2023-10-26 DIAGNOSIS — G47.10 HYPERSOMNOLENCE: ICD-10-CM

## 2023-10-26 DIAGNOSIS — I50.32 CHRONIC DIASTOLIC HEART FAILURE (CMD): Primary | ICD-10-CM

## 2023-10-26 DIAGNOSIS — M47.812 CERVICAL SPONDYLOSIS WITHOUT MYELOPATHY: ICD-10-CM

## 2023-10-26 DIAGNOSIS — J43.1 PANLOBULAR EMPHYSEMA (CMD): ICD-10-CM

## 2023-10-26 DIAGNOSIS — N18.32 STAGE 3B CHRONIC KIDNEY DISEASE (CMD): ICD-10-CM

## 2023-10-26 DIAGNOSIS — K08.89 POORLY FITTING DENTURES: ICD-10-CM

## 2023-10-26 DIAGNOSIS — M51.36 DDD (DEGENERATIVE DISC DISEASE), LUMBAR: Primary | ICD-10-CM

## 2023-10-26 DIAGNOSIS — M77.30 CALCANEAL SPUR, UNSPECIFIED LATERALITY: ICD-10-CM

## 2023-10-26 DIAGNOSIS — E03.9 HYPOTHYROIDISM, UNSPECIFIED TYPE: ICD-10-CM

## 2023-10-26 DIAGNOSIS — K21.9 GASTROESOPHAGEAL REFLUX DISEASE WITHOUT ESOPHAGITIS: ICD-10-CM

## 2023-10-26 DIAGNOSIS — M51.36 DDD (DEGENERATIVE DISC DISEASE), LUMBAR: ICD-10-CM

## 2023-10-26 DIAGNOSIS — M48.062 LUMBAR STENOSIS WITH NEUROGENIC CLAUDICATION: ICD-10-CM

## 2023-10-26 DIAGNOSIS — Z97.2 POORLY FITTING DENTURES: ICD-10-CM

## 2023-10-26 DIAGNOSIS — M81.0 OSTEOPOROSIS, UNSPECIFIED OSTEOPOROSIS TYPE, UNSPECIFIED PATHOLOGICAL FRACTURE PRESENCE: ICD-10-CM

## 2023-10-26 DIAGNOSIS — Z87.891 HISTORY OF TOBACCO USE: ICD-10-CM

## 2023-10-26 PROCEDURE — 10002805 HB CONTRAST AGENT: Performed by: PAIN MEDICINE

## 2023-10-26 PROCEDURE — 1160F RVW MEDS BY RX/DR IN RCRD: CPT | Performed by: NURSE PRACTITIONER

## 2023-10-26 PROCEDURE — 10004187 HB COUNTER-VISIT CENSUS, OUTPATIENT

## 2023-10-26 PROCEDURE — 10005266 HB INJECT FORAMEN ADDL UNILATERAL

## 2023-10-26 PROCEDURE — 99215 OFFICE O/P EST HI 40 MIN: CPT | Performed by: NURSE PRACTITIONER

## 2023-10-26 PROCEDURE — 10002800 HB RX 250 W HCPCS: Performed by: PAIN MEDICINE

## 2023-10-26 PROCEDURE — 77002 NEEDLE LOCALIZATION BY XRAY: CPT

## 2023-10-26 PROCEDURE — 1125F AMNT PAIN NOTED PAIN PRSNT: CPT | Performed by: NURSE PRACTITIONER

## 2023-10-26 PROCEDURE — 1170F FXNL STATUS ASSESSED: CPT | Performed by: NURSE PRACTITIONER

## 2023-10-26 PROCEDURE — 10005265 HB INJECT FORAMEN INITIAL UNILATERAL

## 2023-10-26 RX ORDER — DEXAMETHASONE SODIUM PHOSPHATE 10 MG/ML
20 INJECTION, SOLUTION INTRAMUSCULAR; INTRAVENOUS ONCE
Status: DISCONTINUED | OUTPATIENT
Start: 2023-10-26 | End: 2023-10-28 | Stop reason: HOSPADM

## 2023-10-26 RX ORDER — TRIAMCINOLONE ACETONIDE 40 MG/ML
80 INJECTION, SUSPENSION INTRA-ARTICULAR; INTRAMUSCULAR ONCE
Status: COMPLETED | OUTPATIENT
Start: 2023-10-26 | End: 2023-10-26

## 2023-10-26 RX ADMIN — TRIAMCINOLONE ACETONIDE 80 MG: 40 INJECTION, SUSPENSION INTRA-ARTICULAR; INTRAMUSCULAR at 08:19

## 2023-10-26 RX ADMIN — IOHEXOL 3 ML: 180 INJECTION INTRAVENOUS at 08:19

## 2023-10-26 SDOH — HEALTH STABILITY: MENTAL HEALTH: LITTLE INTEREST OR PLEASURE IN ACTIVITY?: NOT AT ALL

## 2023-10-26 SDOH — ECONOMIC STABILITY: HOUSING INSECURITY: ARE YOU WORRIED ABOUT LOSING YOUR HOUSING?: NO

## 2023-10-26 SDOH — HEALTH STABILITY: MENTAL HEALTH: DEPRESSION SCREENING SCORE: 0

## 2023-10-26 SDOH — SOCIAL STABILITY: SOCIAL NETWORK
HOW OFTEN DO YOU SEE OR TALK TO PEOPLE THAT YOU CARE ABOUT AND FEEL CLOSE TO? (FOR EXAMPLE: TALKING TO FRIENDS ON THE PHONE, VISITING FRIENDS OR FAMILY, GOING TO CHURCH OR CLUB MEETINGS): 5 OR MORE TIMES A WEEK

## 2023-10-26 SDOH — HEALTH STABILITY: MENTAL HEALTH: FEELING DOWN, DEPRESSED OR HOPELESS?: NOT AT ALL

## 2023-10-26 SDOH — HEALTH STABILITY: MENTAL HEALTH: HOW MANY STANDARD DRINKS CONTAINING ALCOHOL DO YOU HAVE ON A TYPICAL DAY?: 0,1 OR 2

## 2023-10-26 SDOH — HEALTH STABILITY: MENTAL HEALTH: PHQ2 INTERPRETATION: NO FURTHER SCREENING NEEDED

## 2023-10-26 SDOH — ECONOMIC STABILITY: FOOD INSECURITY: HOW OFTEN IN THE PAST 12 MONTHS WERE YOU WORRIED OR STRESSED ABOUT HAVING ENOUGH MONEY TO BUY NUTRITIOUS MEALS?: NEVER

## 2023-10-26 SDOH — HEALTH STABILITY: PHYSICAL HEALTH: ON AVERAGE, HOW MANY DAYS PER WEEK DO YOU ENGAGE IN MODERATE TO STRENUOUS EXERCISE (LIKE A BRISK WALK)?: 0 DAYS

## 2023-10-26 SDOH — HEALTH STABILITY: MENTAL HEALTH: HOW OFTEN DO YOU HAVE A DRINK CONTAINING ALCOHOL?: MONTHLY OR LESS

## 2023-10-26 SDOH — ECONOMIC STABILITY: GENERAL

## 2023-10-26 SDOH — HEALTH STABILITY: MENTAL HEALTH: HOW OFTEN DO YOU HAVE 6 OR MORE DRINKS ON ONE OCCASION?: NEVER

## 2023-10-26 SDOH — HEALTH STABILITY: MENTAL HEALTH: AUDIT TOTAL SCORE: 1

## 2023-10-26 SDOH — HEALTH STABILITY: PHYSICAL HEALTH: ON AVERAGE, HOW MANY MINUTES DO YOU ENGAGE IN EXERCISE AT THIS LEVEL?: 0 MIN

## 2023-10-26 ASSESSMENT — ACTIVITIES OF DAILY LIVING (ADL)
RECENT_DECLINE_ADL: NO
ADL_BEFORE_ADMISSION: INDEPENDENT
ADL_SCORE: 12
SENSORY_SUPPORT_DEVICES: DENTURES
ADL_SHORT_OF_BREATH: NO
NEEDS_ASSIST: NO

## 2023-10-26 ASSESSMENT — FRAILTY ASSESSMENTS
HAVE YOU LOST MORE THAN 5 PERCENT OF YOUR WEIGHT IN THE PAST YEAR: NO
DO YOU HAVE ANY OF THESE ILLNESSES: HYPERTENSION, DIABETES, CANCER (OTHER THAN A MINOR SKIN CANCER), CHRONIC LUNG DISEASE, HEART ATTACK, CONGESTIVE HEART FAILURE, ANGINA, ASTHMA, ARTHRITIS, STROKE, AND KIDNEY DISEASE: NO
DO YOU HAVE DIFFICULTY CLIMBING A FLIGHT OF STAIRS: YES
DO YOU HAVE DIFFICULTY WALKING ONE BLOCK: YES
IS THERE EVIDENCE OF AN ACUTE CHANGE IN MENTAL STATUS FROM THE PATIENT'S BASELINE: NO
FRAILTY SCALE TOTAL SCORE: 3
HAVE YOU FELT FATIGUED? MOST OR ALL OF THE TIME OVER THE PAST MONTH: YES

## 2023-10-26 ASSESSMENT — PATIENT HEALTH QUESTIONNAIRE - PHQ9: SUM OF ALL RESPONSES TO PHQ9 QUESTIONS 1 AND 2: 0

## 2023-10-26 ASSESSMENT — PAIN SCALES - GENERAL: PAINLEVEL_OUTOF10: 4

## 2023-10-27 ASSESSMENT — ENCOUNTER SYMPTOMS
CONSTITUTIONAL NEGATIVE: 1
EYES NEGATIVE: 1
ALLERGIC/IMMUNOLOGIC NEGATIVE: 1
HEMATOLOGIC/LYMPHATIC NEGATIVE: 1
BACK PAIN: 1
NEUROLOGICAL NEGATIVE: 1
RESPIRATORY NEGATIVE: 1
ENDOCRINE NEGATIVE: 1
PSYCHIATRIC NEGATIVE: 1
GASTROINTESTINAL NEGATIVE: 1

## 2023-10-30 ENCOUNTER — IMAGING SERVICES (OUTPATIENT)
Dept: CT IMAGING | Age: 68
End: 2023-10-30
Attending: INTERNAL MEDICINE

## 2023-10-30 ENCOUNTER — APPOINTMENT (OUTPATIENT)
Dept: CT IMAGING | Age: 68
End: 2023-10-30
Attending: INTERNAL MEDICINE

## 2023-10-30 DIAGNOSIS — Z87.891 HISTORY OF TOBACCO USE: ICD-10-CM

## 2023-10-30 PROCEDURE — 71271 CT THORAX LUNG CANCER SCR C-: CPT | Performed by: RADIOLOGY

## 2023-11-01 ENCOUNTER — OFFICE VISIT (OUTPATIENT)
Dept: REHABILITATION | Age: 68
End: 2023-11-01

## 2023-11-01 DIAGNOSIS — M47.812 CERVICAL SPONDYLOSIS WITHOUT MYELOPATHY: ICD-10-CM

## 2023-11-01 PROCEDURE — 97110 THERAPEUTIC EXERCISES: CPT | Performed by: PHYSICAL THERAPY ASSISTANT

## 2023-11-03 ENCOUNTER — OFFICE VISIT (OUTPATIENT)
Dept: FAMILY MEDICINE | Age: 68
End: 2023-11-03

## 2023-11-03 VITALS
HEIGHT: 65 IN | BODY MASS INDEX: 25.81 KG/M2 | SYSTOLIC BLOOD PRESSURE: 120 MMHG | DIASTOLIC BLOOD PRESSURE: 60 MMHG | HEART RATE: 68 BPM | WEIGHT: 154.9 LBS | OXYGEN SATURATION: 100 % | RESPIRATION RATE: 16 BRPM

## 2023-11-03 DIAGNOSIS — F51.01 PRIMARY INSOMNIA: ICD-10-CM

## 2023-11-03 DIAGNOSIS — I10 PRIMARY HYPERTENSION: ICD-10-CM

## 2023-11-03 DIAGNOSIS — F31.61 BIPOLAR DISORDER, CURRENT EPISODE MIXED, MILD (CMD): Primary | ICD-10-CM

## 2023-11-03 PROCEDURE — 99214 OFFICE O/P EST MOD 30 MIN: CPT | Performed by: FAMILY MEDICINE

## 2023-11-06 ENCOUNTER — OFFICE VISIT (OUTPATIENT)
Dept: REHABILITATION | Age: 68
End: 2023-11-06

## 2023-11-06 DIAGNOSIS — M54.2 CERVICALGIA: ICD-10-CM

## 2023-11-06 DIAGNOSIS — M47.812 CERVICAL SPONDYLOSIS WITHOUT MYELOPATHY: Primary | ICD-10-CM

## 2023-11-06 PROCEDURE — 97110 THERAPEUTIC EXERCISES: CPT | Performed by: PHYSICAL THERAPIST

## 2023-11-08 ENCOUNTER — APPOINTMENT (OUTPATIENT)
Dept: MRI IMAGING | Age: 68
End: 2023-11-08
Attending: HOSPITALIST

## 2023-11-08 ENCOUNTER — HOSPITAL ENCOUNTER (OUTPATIENT)
Age: 68
Setting detail: OBSERVATION
Discharge: HOME HEALTH | End: 2023-11-09
Attending: EMERGENCY MEDICINE | Admitting: HOSPITALIST

## 2023-11-08 ENCOUNTER — APPOINTMENT (OUTPATIENT)
Dept: CT IMAGING | Age: 68
End: 2023-11-08
Attending: EMERGENCY MEDICINE

## 2023-11-08 ENCOUNTER — OFFICE VISIT (OUTPATIENT)
Dept: REHABILITATION | Age: 68
End: 2023-11-08

## 2023-11-08 DIAGNOSIS — M47.812 CERVICAL SPONDYLOSIS WITHOUT MYELOPATHY: ICD-10-CM

## 2023-11-08 DIAGNOSIS — G47.10 HYPERSOMNOLENCE: ICD-10-CM

## 2023-11-08 DIAGNOSIS — Z96.643 HISTORY OF BILATERAL HIP REPLACEMENTS: ICD-10-CM

## 2023-11-08 DIAGNOSIS — R41.82 ALTERED MENTAL STATUS, UNSPECIFIED ALTERED MENTAL STATUS TYPE: Primary | ICD-10-CM

## 2023-11-08 LAB
ALBUMIN SERPL-MCNC: 3.5 G/DL (ref 3.6–5.1)
ALBUMIN/GLOB SERPL: 1.2 {RATIO} (ref 1–2.4)
ALP SERPL-CCNC: 93 UNITS/L (ref 45–117)
ALT SERPL-CCNC: 19 UNITS/L
ANION GAP BLD CALC-SCNC: 15 MMOL/L (ref 7–19)
ANION GAP SERPL CALC-SCNC: 8 MMOL/L (ref 7–19)
APPEARANCE UR: ABNORMAL
APTT PPP: 28 SEC (ref 22–32)
AST SERPL-CCNC: 15 UNITS/L
ATRIAL RATE (BPM): 70
BACTERIA #/AREA URNS HPF: ABNORMAL /HPF
BASOPHILS # BLD: 0 K/MCL (ref 0–0.3)
BASOPHILS NFR BLD: 1 %
BILIRUB SERPL-MCNC: 0.3 MG/DL (ref 0.2–1)
BILIRUB UR QL STRIP: NEGATIVE
BUN BLD-MCNC: 30 MG/DL (ref 6–20)
BUN SERPL-MCNC: 33 MG/DL (ref 6–20)
BUN/CREAT SERPL: 23 (ref 7–25)
CA-I BLD-SCNC: 1.22 MMOL/L (ref 1.15–1.29)
CALCIUM SERPL-MCNC: 8.7 MG/DL (ref 8.4–10.2)
CHLORIDE BLD-SCNC: 113 MMOL/L (ref 97–110)
CHLORIDE SERPL-SCNC: 116 MMOL/L (ref 97–110)
CO2 BLD-SCNC: 21 MMOL/L (ref 19–24)
CO2 SERPL-SCNC: 24 MMOL/L (ref 21–32)
COLOR UR: YELLOW
CREAT SERPL-MCNC: 1.45 MG/DL (ref 0.51–0.95)
CREAT SERPL-MCNC: 1.7 MG/DL (ref 0.51–0.95)
DEPRECATED RDW RBC: 53.6 FL (ref 39–50)
DIASTOLIC BLOOD PRESSURE: 54
EGFRCR SERPLBLD CKD-EPI 2021: 32 ML/MIN/{1.73_M2}
EGFRCR SERPLBLD CKD-EPI 2021: 39 ML/MIN/{1.73_M2}
EOSINOPHIL # BLD: 0.1 K/MCL (ref 0–0.5)
EOSINOPHIL NFR BLD: 2 %
ERYTHROCYTE [DISTWIDTH] IN BLOOD: 12.9 % (ref 11–15)
ETHANOL SERPL-MCNC: NORMAL MG/DL
FASTING DURATION TIME PATIENT: ABNORMAL H
GLOBULIN SER-MCNC: 3 G/DL (ref 2–4)
GLUCOSE BLD-MCNC: 86 MG/DL (ref 70–99)
GLUCOSE BLDC GLUCOMTR-MCNC: 84 MG/DL (ref 70–99)
GLUCOSE SERPL-MCNC: 93 MG/DL (ref 70–99)
GLUCOSE UR STRIP-MCNC: NEGATIVE MG/DL
HBA1C MFR BLD: 4.8 % (ref 4.5–5.6)
HCT VFR BLD CALC: 36 % (ref 36–46.5)
HCT VFR BLD CALC: 36.6 % (ref 36–46.5)
HGB BLD CALC-MCNC: 12.2 G/DL (ref 12–15.5)
HGB BLD-MCNC: 11.3 G/DL (ref 12–15.5)
HGB UR QL STRIP: NEGATIVE
HYALINE CASTS #/AREA URNS LPF: ABNORMAL /LPF
IMM GRANULOCYTES # BLD AUTO: 0 K/MCL (ref 0–0.2)
IMM GRANULOCYTES # BLD: 0 %
INR PPP: 0.9
KETONES UR STRIP-MCNC: NEGATIVE MG/DL
LEUKOCYTE ESTERASE UR QL STRIP: ABNORMAL
LYMPHOCYTES # BLD: 1.2 K/MCL (ref 1–4)
LYMPHOCYTES NFR BLD: 23 %
MCH RBC QN AUTO: 34.5 PG (ref 26–34)
MCHC RBC AUTO-ENTMCNC: 30.9 G/DL (ref 32–36.5)
MCV RBC AUTO: 111.6 FL (ref 78–100)
MONOCYTES # BLD: 0.3 K/MCL (ref 0.3–0.9)
MONOCYTES NFR BLD: 6 %
MUCOUS THREADS URNS QL MICRO: PRESENT
NEUTROPHILS # BLD: 3.7 K/MCL (ref 1.8–7.7)
NEUTROPHILS NFR BLD: 68 %
NITRITE UR QL STRIP: NEGATIVE
NRBC BLD MANUAL-RTO: 0 /100 WBC
P AXIS (DEGREES): 50
PH UR STRIP: 6.5 [PH] (ref 5–7)
PLATELET # BLD AUTO: 209 K/MCL (ref 140–450)
POTASSIUM BLD-SCNC: 4.6 MMOL/L (ref 3.4–5.1)
POTASSIUM SERPL-SCNC: 4.5 MMOL/L (ref 3.4–5.1)
PR-INTERVAL (MSEC): 188
PROT SERPL-MCNC: 6.5 G/DL (ref 6.4–8.2)
PROT UR STRIP-MCNC: NEGATIVE MG/DL
PROTHROMBIN TIME: 10.3 SEC (ref 9.7–11.8)
QRS-INTERVAL (MSEC): 98
QT-INTERVAL (MSEC): 404
QTC: 436
R AXIS (DEGREES): 9
RAINBOW EXTRA TUBES HOLD SPECIMEN: NORMAL
RBC # BLD: 3.28 MIL/MCL (ref 4–5.2)
RBC #/AREA URNS HPF: ABNORMAL /HPF
REPORT TEXT: NORMAL
SODIUM BLD-SCNC: 144 MMOL/L (ref 135–145)
SODIUM SERPL-SCNC: 143 MMOL/L (ref 135–145)
SP GR UR STRIP: 1.02 (ref 1–1.03)
SQUAMOUS #/AREA URNS HPF: ABNORMAL /HPF
SYSTOLIC BLOOD PRESSURE: 116
T AXIS (DEGREES): 39
TRANS CELLS #/AREA URNS HPF: ABNORMAL /HPF
TROPONIN I BLD-MCNC: <0.1 NG/ML
TROPONIN I SERPL DL<=0.01 NG/ML-MCNC: 7 NG/L
UROBILINOGEN UR STRIP-MCNC: 0.2 MG/DL
VENTRICULAR RATE EKG/MIN (BPM): 70
WBC # BLD: 5.4 K/MCL (ref 4.2–11)
WBC #/AREA URNS HPF: ABNORMAL /HPF

## 2023-11-08 PROCEDURE — 10004651 HB RX, NO CHARGE ITEM: Performed by: HOSPITALIST

## 2023-11-08 PROCEDURE — 10004651 HB RX, NO CHARGE ITEM: Performed by: EMERGENCY MEDICINE

## 2023-11-08 PROCEDURE — G0378 HOSPITAL OBSERVATION PER HR: HCPCS

## 2023-11-08 PROCEDURE — 10004174 HB COUNTER-THERAPY VISIT SP: Performed by: SPEECH-LANGUAGE PATHOLOGIST

## 2023-11-08 PROCEDURE — 81001 URINALYSIS AUTO W/SCOPE: CPT | Performed by: EMERGENCY MEDICINE

## 2023-11-08 PROCEDURE — 10002803 HB RX 637: Performed by: HOSPITALIST

## 2023-11-08 PROCEDURE — 70450 CT HEAD/BRAIN W/O DYE: CPT

## 2023-11-08 PROCEDURE — 92610 EVALUATE SWALLOWING FUNCTION: CPT | Performed by: SPEECH-LANGUAGE PATHOLOGIST

## 2023-11-08 PROCEDURE — 70551 MRI BRAIN STEM W/O DYE: CPT | Performed by: INTERNAL MEDICINE

## 2023-11-08 PROCEDURE — 87086 URINE CULTURE/COLONY COUNT: CPT | Performed by: EMERGENCY MEDICINE

## 2023-11-08 PROCEDURE — 97116 GAIT TRAINING THERAPY: CPT

## 2023-11-08 PROCEDURE — 84484 ASSAY OF TROPONIN QUANT: CPT | Performed by: EMERGENCY MEDICINE

## 2023-11-08 PROCEDURE — 87186 SC STD MICRODIL/AGAR DIL: CPT | Performed by: EMERGENCY MEDICINE

## 2023-11-08 PROCEDURE — 85610 PROTHROMBIN TIME: CPT | Performed by: EMERGENCY MEDICINE

## 2023-11-08 PROCEDURE — 85025 COMPLETE CBC W/AUTO DIFF WBC: CPT | Performed by: EMERGENCY MEDICINE

## 2023-11-08 PROCEDURE — 10002807 HB RX 258: Performed by: HOSPITALIST

## 2023-11-08 PROCEDURE — 80053 COMPREHEN METABOLIC PANEL: CPT | Performed by: EMERGENCY MEDICINE

## 2023-11-08 PROCEDURE — 70496 CT ANGIOGRAPHY HEAD: CPT | Performed by: RADIOLOGY

## 2023-11-08 PROCEDURE — 96372 THER/PROPH/DIAG INJ SC/IM: CPT | Performed by: HOSPITALIST

## 2023-11-08 PROCEDURE — 70498 CT ANGIOGRAPHY NECK: CPT

## 2023-11-08 PROCEDURE — 97161 PT EVAL LOW COMPLEX 20 MIN: CPT

## 2023-11-08 PROCEDURE — 84484 ASSAY OF TROPONIN QUANT: CPT

## 2023-11-08 PROCEDURE — 10004173 HB COUNTER-THERAPY VISIT PT

## 2023-11-08 PROCEDURE — 99223 1ST HOSP IP/OBS HIGH 75: CPT | Performed by: PSYCHIATRY & NEUROLOGY

## 2023-11-08 PROCEDURE — 85730 THROMBOPLASTIN TIME PARTIAL: CPT | Performed by: EMERGENCY MEDICINE

## 2023-11-08 PROCEDURE — 70450 CT HEAD/BRAIN W/O DYE: CPT | Performed by: RADIOLOGY

## 2023-11-08 PROCEDURE — 82077 ASSAY SPEC XCP UR&BREATH IA: CPT | Performed by: EMERGENCY MEDICINE

## 2023-11-08 PROCEDURE — 99223 1ST HOSP IP/OBS HIGH 75: CPT | Performed by: HOSPITALIST

## 2023-11-08 PROCEDURE — 70551 MRI BRAIN STEM W/O DYE: CPT

## 2023-11-08 PROCEDURE — 80047 BASIC METABLC PNL IONIZED CA: CPT

## 2023-11-08 PROCEDURE — 82962 GLUCOSE BLOOD TEST: CPT

## 2023-11-08 PROCEDURE — 99285 EMERGENCY DEPT VISIT HI MDM: CPT

## 2023-11-08 PROCEDURE — 36415 COLL VENOUS BLD VENIPUNCTURE: CPT

## 2023-11-08 PROCEDURE — 93005 ELECTROCARDIOGRAM TRACING: CPT | Performed by: EMERGENCY MEDICINE

## 2023-11-08 PROCEDURE — 10002800 HB RX 250 W HCPCS: Performed by: HOSPITALIST

## 2023-11-08 PROCEDURE — 83036 HEMOGLOBIN GLYCOSYLATED A1C: CPT | Performed by: EMERGENCY MEDICINE

## 2023-11-08 PROCEDURE — 10002805 HB CONTRAST AGENT: Performed by: EMERGENCY MEDICINE

## 2023-11-08 PROCEDURE — 70498 CT ANGIOGRAPHY NECK: CPT | Performed by: RADIOLOGY

## 2023-11-08 PROCEDURE — 10002807 HB RX 258: Performed by: EMERGENCY MEDICINE

## 2023-11-08 RX ORDER — ASPIRIN 81 MG/1
162 TABLET, CHEWABLE ORAL ONCE
Status: COMPLETED | OUTPATIENT
Start: 2023-11-08 | End: 2023-11-08

## 2023-11-08 RX ORDER — ASPIRIN 325 MG
325 TABLET ORAL DAILY
Status: DISCONTINUED | OUTPATIENT
Start: 2023-11-09 | End: 2023-11-09 | Stop reason: HOSPADM

## 2023-11-08 RX ORDER — MODAFINIL 100 MG/1
200 TABLET ORAL DAILY
Status: DISCONTINUED | OUTPATIENT
Start: 2023-11-08 | End: 2023-11-09 | Stop reason: HOSPADM

## 2023-11-08 RX ORDER — ASPIRIN 300 MG/1
300 SUPPOSITORY RECTAL DAILY
Status: DISCONTINUED | OUTPATIENT
Start: 2023-11-09 | End: 2023-11-09 | Stop reason: HOSPADM

## 2023-11-08 RX ORDER — 0.9 % SODIUM CHLORIDE 0.9 %
2 VIAL (ML) INJECTION EVERY 12 HOURS SCHEDULED
Status: DISCONTINUED | OUTPATIENT
Start: 2023-11-08 | End: 2023-11-09 | Stop reason: HOSPADM

## 2023-11-08 RX ORDER — LORAZEPAM 1 MG/1
1 TABLET ORAL
Status: COMPLETED | OUTPATIENT
Start: 2023-11-08 | End: 2023-11-08

## 2023-11-08 RX ORDER — LAMOTRIGINE 25 MG/1
50 TABLET ORAL DAILY
Status: DISCONTINUED | OUTPATIENT
Start: 2023-11-08 | End: 2023-11-09 | Stop reason: HOSPADM

## 2023-11-08 RX ORDER — MODAFINIL 100 MG/1
200 TABLET ORAL ONCE
Status: COMPLETED | OUTPATIENT
Start: 2023-11-08 | End: 2023-11-08

## 2023-11-08 RX ORDER — SODIUM CHLORIDE 9 MG/ML
INJECTION, SOLUTION INTRAVENOUS CONTINUOUS
Status: DISCONTINUED | OUTPATIENT
Start: 2023-11-08 | End: 2023-11-09

## 2023-11-08 RX ORDER — ENOXAPARIN SODIUM 100 MG/ML
40 INJECTION SUBCUTANEOUS EVERY EVENING
Status: DISCONTINUED | OUTPATIENT
Start: 2023-11-08 | End: 2023-11-09 | Stop reason: HOSPADM

## 2023-11-08 RX ORDER — PANTOPRAZOLE SODIUM 40 MG/1
40 TABLET, DELAYED RELEASE ORAL EVERY 12 HOURS SCHEDULED
Status: DISCONTINUED | OUTPATIENT
Start: 2023-11-08 | End: 2023-11-09 | Stop reason: HOSPADM

## 2023-11-08 RX ORDER — POLYETHYLENE GLYCOL 3350 17 G/17G
17 POWDER, FOR SOLUTION ORAL DAILY PRN
Status: DISCONTINUED | OUTPATIENT
Start: 2023-11-08 | End: 2023-11-09 | Stop reason: HOSPADM

## 2023-11-08 RX ORDER — ACETAMINOPHEN 325 MG/1
650 TABLET ORAL EVERY 4 HOURS PRN
Status: DISCONTINUED | OUTPATIENT
Start: 2023-11-08 | End: 2023-11-09 | Stop reason: HOSPADM

## 2023-11-08 RX ORDER — ATORVASTATIN CALCIUM 80 MG/1
80 TABLET, FILM COATED ORAL NIGHTLY
Status: DISCONTINUED | OUTPATIENT
Start: 2023-11-08 | End: 2023-11-09 | Stop reason: HOSPADM

## 2023-11-08 RX ORDER — ACETAMINOPHEN 650 MG/1
650 SUPPOSITORY RECTAL EVERY 4 HOURS PRN
Status: DISCONTINUED | OUTPATIENT
Start: 2023-11-08 | End: 2023-11-09 | Stop reason: HOSPADM

## 2023-11-08 RX ADMIN — SODIUM CHLORIDE: 9 INJECTION, SOLUTION INTRAVENOUS at 15:58

## 2023-11-08 RX ADMIN — ASPIRIN 81 MG CHEWABLE TABLET 162 MG: 81 TABLET CHEWABLE at 15:53

## 2023-11-08 RX ADMIN — IOHEXOL 75 ML: 350 INJECTION, SOLUTION INTRAVENOUS at 09:59

## 2023-11-08 RX ADMIN — LORAZEPAM 1 MG: 1 TABLET ORAL at 17:56

## 2023-11-08 RX ADMIN — MODAFINIL 200 MG: 100 TABLET ORAL at 20:44

## 2023-11-08 RX ADMIN — ENOXAPARIN SODIUM 40 MG: 40 INJECTION SUBCUTANEOUS at 20:42

## 2023-11-08 RX ADMIN — PANTOPRAZOLE SODIUM 40 MG: 40 TABLET, DELAYED RELEASE ORAL at 20:42

## 2023-11-08 RX ADMIN — ATORVASTATIN CALCIUM 80 MG: 80 TABLET, FILM COATED ORAL at 20:41

## 2023-11-08 RX ADMIN — SODIUM CHLORIDE, PRESERVATIVE FREE 2 ML: 5 INJECTION INTRAVENOUS at 20:41

## 2023-11-08 RX ADMIN — SODIUM CHLORIDE, PRESERVATIVE FREE 2 ML: 5 INJECTION INTRAVENOUS at 11:27

## 2023-11-08 RX ADMIN — SODIUM CHLORIDE 100 ML: 9 INJECTION, SOLUTION INTRAVENOUS at 09:56

## 2023-11-08 SDOH — ECONOMIC STABILITY: HOUSING INSECURITY: ARE YOU WORRIED ABOUT LOSING YOUR HOUSING?: NO

## 2023-11-08 SDOH — HEALTH STABILITY: PHYSICAL HEALTH: DO YOU HAVE SERIOUS DIFFICULTY WALKING OR CLIMBING STAIRS?: NO

## 2023-11-08 SDOH — HEALTH STABILITY: GENERAL: BECAUSE OF A PHYSICAL, MENTAL, OR EMOTIONAL CONDITION, DO YOU HAVE DIFFICULTY DOING ERRANDS ALONE?: NO

## 2023-11-08 SDOH — HEALTH STABILITY: GENERAL
BECAUSE OF A PHYSICAL, MENTAL, OR EMOTIONAL CONDITION, DO YOU HAVE SERIOUS DIFFICULTY CONCENTRATING, REMEMBERING OR MAKING DECISIONS?: NO

## 2023-11-08 SDOH — ECONOMIC STABILITY: HOUSING INSECURITY: WHAT IS YOUR LIVING SITUATION TODAY?: ALONE

## 2023-11-08 SDOH — SOCIAL STABILITY: SOCIAL NETWORK: SUPPORT SYSTEMS: CASE MANAGER/SOCIAL WORKER;FRIENDS

## 2023-11-08 SDOH — SOCIAL STABILITY: SOCIAL NETWORK
HOW OFTEN DO YOU SEE OR TALK TO PEOPLE THAT YOU CARE ABOUT AND FEEL CLOSE TO? (FOR EXAMPLE: TALKING TO FRIENDS ON THE PHONE, VISITING FRIENDS OR FAMILY, GOING TO CHURCH OR CLUB MEETINGS): 1 OR 2 TIMES A WEEK

## 2023-11-08 SDOH — HEALTH STABILITY: PHYSICAL HEALTH: DO YOU HAVE DIFFICULTY DRESSING OR BATHING?: NO

## 2023-11-08 SDOH — ECONOMIC STABILITY: HOUSING INSECURITY: WHAT IS YOUR LIVING SITUATION TODAY?: APARTMENT

## 2023-11-08 SDOH — ECONOMIC STABILITY: GENERAL

## 2023-11-08 SDOH — ECONOMIC STABILITY: FOOD INSECURITY: HOW OFTEN IN THE PAST 12 MONTHS WERE YOU WORRIED OR STRESSED ABOUT HAVING ENOUGH MONEY TO BUY NUTRITIOUS MEALS?: NEVER

## 2023-11-08 ASSESSMENT — PAIN SCALES - GENERAL
PAINLEVEL_OUTOF10: 6
PAINLEVEL_OUTOF10: 6
PAINLEVEL_OUTOF10: 0
PAINLEVEL_OUTOF10: 6

## 2023-11-08 ASSESSMENT — PATIENT HEALTH QUESTIONNAIRE - PHQ9
SUM OF ALL RESPONSES TO PHQ9 QUESTIONS 1 AND 2: 0
IS PATIENT ABLE TO COMPLETE PHQ2 OR PHQ9: YES
2. FEELING DOWN, DEPRESSED OR HOPELESS: NOT AT ALL
SUM OF ALL RESPONSES TO PHQ9 QUESTIONS 1 AND 2: 0
1. LITTLE INTEREST OR PLEASURE IN DOING THINGS: NOT AT ALL
CLINICAL INTERPRETATION OF PHQ2 SCORE: NO FURTHER SCREENING NEEDED

## 2023-11-08 ASSESSMENT — COGNITIVE AND FUNCTIONAL STATUS - GENERAL
BASIC_MOBILITY_CONVERTED_SCORE: 41.05
BASIC_MOBILITY_RAW_SCORE: 18

## 2023-11-08 ASSESSMENT — ACTIVITIES OF DAILY LIVING (ADL)
ADL_BEFORE_ADMISSION: INDEPENDENT
ADL_SHORT_OF_BREATH: NO
ADL_SCORE: 23
DRESSING: INDEPENDENT
RECENT_DECLINE_ADL: YES, DECLINE IN AMBULATION/TRANSFERRING, COLLABORATE WITH PROVIDER (T)
FEEDING: INDEPENDENT
TOILETING: INDEPENDENT
BATHING: INDEPENDENT

## 2023-11-08 ASSESSMENT — ORIENTATION MEMORY CONCENTRATION TEST (OMCT)
WHAT TIME IS IT (NO WATCH OR CLOCK): CORRECT
COUNT BACKWARDS FROM 20 TO 1: CORRECT
WHAT YEAR IS IT NOW (MUST BE EXACT): CORRECT
WHAT MONTH IS IT NOW: CORRECT
REPEAT THE NAME AND ADDRESS I ASKED YOU TO REMEMBER: CORRECT
SAY THE MONTHS IN REVERSE ORDER STARTING WITH LAST MONTH: CORRECT
OMCT INTERPRETATION: 0-6: NO SIGNIFICANT IMPAIRMENT
OMCT SCORE: 0

## 2023-11-08 ASSESSMENT — LIFESTYLE VARIABLES
AUDIT-C TOTAL SCORE: 1
ALCOHOL_USE_STATUS: NO OR LOW RISK WITH VALIDATED TOOL
HOW OFTEN DO YOU HAVE A DRINK CONTAINING ALCOHOL: MONTHLY OR LESS
HOW MANY STANDARD DRINKS CONTAINING ALCOHOL DO YOU HAVE ON A TYPICAL DAY: 0,1 OR 2
HOW OFTEN DO YOU HAVE 6 OR MORE DRINKS ON ONE OCCASION: NEVER

## 2023-11-08 ASSESSMENT — COLUMBIA-SUICIDE SEVERITY RATING SCALE - C-SSRS
IS THE PATIENT ABLE TO COMPLETE C-SSRS: YES
6. HAVE YOU EVER DONE ANYTHING, STARTED TO DO ANYTHING, OR PREPARED TO DO ANYTHING TO END YOUR LIFE?: NO
2. HAVE YOU ACTUALLY HAD ANY THOUGHTS OF KILLING YOURSELF?: NO
1. WITHIN THE PAST MONTH, HAVE YOU WISHED YOU WERE DEAD OR WISHED YOU COULD GO TO SLEEP AND NOT WAKE UP?: NO

## 2023-11-08 ASSESSMENT — PAIN DESCRIPTION - PAIN TYPE: TYPE: ACUTE PAIN

## 2023-11-09 ENCOUNTER — APPOINTMENT (OUTPATIENT)
Dept: CARDIOLOGY | Age: 68
End: 2023-11-09
Attending: HOSPITALIST

## 2023-11-09 VITALS
DIASTOLIC BLOOD PRESSURE: 54 MMHG | BODY MASS INDEX: 28.85 KG/M2 | HEIGHT: 64 IN | RESPIRATION RATE: 19 BRPM | HEART RATE: 66 BPM | SYSTOLIC BLOOD PRESSURE: 106 MMHG | TEMPERATURE: 98.8 F | WEIGHT: 169 LBS | OXYGEN SATURATION: 96 %

## 2023-11-09 LAB
ALBUMIN SERPL-MCNC: 3.3 G/DL (ref 3.6–5.1)
ALBUMIN/GLOB SERPL: 1.1 {RATIO} (ref 1–2.4)
ALP SERPL-CCNC: 79 UNITS/L (ref 45–117)
ALT SERPL-CCNC: 20 UNITS/L
ANION GAP SERPL CALC-SCNC: 4 MMOL/L (ref 7–19)
AST SERPL-CCNC: 15 UNITS/L
BACTERIA UR CULT: ABNORMAL
BASOPHILS # BLD: 0.1 K/MCL (ref 0–0.3)
BASOPHILS NFR BLD: 1 %
BILIRUB SERPL-MCNC: 0.4 MG/DL (ref 0.2–1)
BUN SERPL-MCNC: 22 MG/DL (ref 6–20)
BUN/CREAT SERPL: 16 (ref 7–25)
CALCIUM SERPL-MCNC: 8.6 MG/DL (ref 8.4–10.2)
CHLORIDE SERPL-SCNC: 116 MMOL/L (ref 97–110)
CHOLEST SERPL-MCNC: 215 MG/DL
CHOLEST/HDLC SERPL: 3.1 {RATIO}
CO2 SERPL-SCNC: 25 MMOL/L (ref 21–32)
CREAT SERPL-MCNC: 1.34 MG/DL (ref 0.51–0.95)
DEPRECATED RDW RBC: 53.3 FL (ref 39–50)
EGFRCR SERPLBLD CKD-EPI 2021: 43 ML/MIN/{1.73_M2}
EOSINOPHIL # BLD: 0.1 K/MCL (ref 0–0.5)
EOSINOPHIL NFR BLD: 2 %
ERYTHROCYTE [DISTWIDTH] IN BLOOD: 12.9 % (ref 11–15)
FASTING DURATION TIME PATIENT: ABNORMAL H
GLOBULIN SER-MCNC: 3.1 G/DL (ref 2–4)
GLUCOSE SERPL-MCNC: 82 MG/DL (ref 70–99)
HCT VFR BLD CALC: 35.6 % (ref 36–46.5)
HDLC SERPL-MCNC: 70 MG/DL
HGB BLD-MCNC: 11 G/DL (ref 12–15.5)
IMM GRANULOCYTES # BLD AUTO: 0 K/MCL (ref 0–0.2)
IMM GRANULOCYTES # BLD: 0 %
LDLC SERPL CALC-MCNC: 123 MG/DL
LYMPHOCYTES # BLD: 1.8 K/MCL (ref 1–4)
LYMPHOCYTES NFR BLD: 38 %
MCH RBC QN AUTO: 34.9 PG (ref 26–34)
MCHC RBC AUTO-ENTMCNC: 30.9 G/DL (ref 32–36.5)
MCV RBC AUTO: 113 FL (ref 78–100)
MONOCYTES # BLD: 0.3 K/MCL (ref 0.3–0.9)
MONOCYTES NFR BLD: 7 %
NEUTROPHILS # BLD: 2.4 K/MCL (ref 1.8–7.7)
NEUTROPHILS NFR BLD: 52 %
NONHDLC SERPL-MCNC: 145 MG/DL
NRBC BLD MANUAL-RTO: 0 /100 WBC
PLATELET # BLD AUTO: 199 K/MCL (ref 140–450)
POTASSIUM SERPL-SCNC: 4.9 MMOL/L (ref 3.4–5.1)
PROT SERPL-MCNC: 6.4 G/DL (ref 6.4–8.2)
RAINBOW EXTRA TUBES HOLD SPECIMEN: NORMAL
RBC # BLD: 3.15 MIL/MCL (ref 4–5.2)
SODIUM SERPL-SCNC: 140 MMOL/L (ref 135–145)
TRIGL SERPL-MCNC: 108 MG/DL
WBC # BLD: 4.7 K/MCL (ref 4.2–11)

## 2023-11-09 PROCEDURE — 10004172 HB COUNTER-THERAPY VISIT OT

## 2023-11-09 PROCEDURE — 10004174 HB COUNTER-THERAPY VISIT SP: Performed by: SPEECH-LANGUAGE PATHOLOGIST

## 2023-11-09 PROCEDURE — 97535 SELF CARE MNGMENT TRAINING: CPT

## 2023-11-09 PROCEDURE — 80061 LIPID PANEL: CPT | Performed by: HOSPITALIST

## 2023-11-09 PROCEDURE — 85025 COMPLETE CBC W/AUTO DIFF WBC: CPT | Performed by: HOSPITALIST

## 2023-11-09 PROCEDURE — 99233 SBSQ HOSP IP/OBS HIGH 50: CPT | Performed by: PSYCHIATRY & NEUROLOGY

## 2023-11-09 PROCEDURE — 10002803 HB RX 637: Performed by: HOSPITALIST

## 2023-11-09 PROCEDURE — G0378 HOSPITAL OBSERVATION PER HR: HCPCS

## 2023-11-09 PROCEDURE — 97116 GAIT TRAINING THERAPY: CPT

## 2023-11-09 PROCEDURE — 10002807 HB RX 258: Performed by: HOSPITALIST

## 2023-11-09 PROCEDURE — 97165 OT EVAL LOW COMPLEX 30 MIN: CPT

## 2023-11-09 PROCEDURE — S0310 HOSPITALIST VISIT: HCPCS | Performed by: HOSPITALIST

## 2023-11-09 PROCEDURE — 10004173 HB COUNTER-THERAPY VISIT PT

## 2023-11-09 PROCEDURE — 90792 PSYCH DIAG EVAL W/MED SRVCS: CPT | Performed by: PSYCHIATRY & NEUROLOGY

## 2023-11-09 PROCEDURE — 99239 HOSP IP/OBS DSCHRG MGMT >30: CPT | Performed by: HOSPITALIST

## 2023-11-09 PROCEDURE — 97530 THERAPEUTIC ACTIVITIES: CPT

## 2023-11-09 PROCEDURE — 10004651 HB RX, NO CHARGE ITEM: Performed by: HOSPITALIST

## 2023-11-09 PROCEDURE — 92523 SPEECH SOUND LANG COMPREHEN: CPT | Performed by: SPEECH-LANGUAGE PATHOLOGIST

## 2023-11-09 PROCEDURE — 80053 COMPREHEN METABOLIC PANEL: CPT | Performed by: HOSPITALIST

## 2023-11-09 PROCEDURE — 92526 ORAL FUNCTION THERAPY: CPT | Performed by: SPEECH-LANGUAGE PATHOLOGIST

## 2023-11-09 PROCEDURE — 36415 COLL VENOUS BLD VENIPUNCTURE: CPT | Performed by: HOSPITALIST

## 2023-11-09 RX ORDER — MODAFINIL 200 MG/1
200 TABLET ORAL DAILY
Qty: 30 TABLET | Refills: 0 | Status: SHIPPED | OUTPATIENT
Start: 2023-11-09 | End: 2023-12-09

## 2023-11-09 RX ORDER — ACAMPROSATE CALCIUM 333 MG/1
333 TABLET, DELAYED RELEASE ORAL 3 TIMES DAILY
Qty: 90 TABLET | Refills: 0 | Status: SHIPPED | OUTPATIENT
Start: 2023-11-09 | End: 2023-12-09

## 2023-11-09 RX ORDER — QUETIAPINE FUMARATE 25 MG/1
25 TABLET, FILM COATED ORAL NIGHTLY
Qty: 30 TABLET | Refills: 0 | Status: SHIPPED | OUTPATIENT
Start: 2023-11-09 | End: 2023-12-09

## 2023-11-09 RX ORDER — FLUOXETINE 10 MG/1
10 CAPSULE ORAL DAILY
Qty: 30 CAPSULE | Refills: 0 | Status: SHIPPED | OUTPATIENT
Start: 2023-11-09 | End: 2023-12-09

## 2023-11-09 RX ORDER — QUETIAPINE FUMARATE 25 MG/1
25 TABLET, FILM COATED ORAL NIGHTLY
Status: DISCONTINUED | OUTPATIENT
Start: 2023-11-09 | End: 2023-11-09 | Stop reason: HOSPADM

## 2023-11-09 RX ORDER — LAMOTRIGINE 25 MG/1
50 TABLET ORAL DAILY
Status: DISCONTINUED | OUTPATIENT
Start: 2023-11-09 | End: 2023-11-09

## 2023-11-09 RX ORDER — DULOXETIN HYDROCHLORIDE 60 MG/1
60 CAPSULE, DELAYED RELEASE ORAL DAILY
Status: DISCONTINUED | OUTPATIENT
Start: 2023-11-09 | End: 2023-11-09 | Stop reason: HOSPADM

## 2023-11-09 RX ORDER — ACAMPROSATE CALCIUM 333 MG/1
333 TABLET, DELAYED RELEASE ORAL 3 TIMES DAILY
Status: DISCONTINUED | OUTPATIENT
Start: 2023-11-09 | End: 2023-11-09 | Stop reason: HOSPADM

## 2023-11-09 RX ORDER — FLUOXETINE 10 MG/1
10 CAPSULE ORAL DAILY
Status: DISCONTINUED | OUTPATIENT
Start: 2023-11-09 | End: 2023-11-09 | Stop reason: HOSPADM

## 2023-11-09 RX ORDER — ASPIRIN 81 MG/1
81 TABLET, CHEWABLE ORAL DAILY
COMMUNITY

## 2023-11-09 RX ORDER — FERROUS SULFATE 325(65) MG
325 TABLET ORAL 2 TIMES DAILY
COMMUNITY

## 2023-11-09 RX ADMIN — ASPIRIN 325 MG ORAL TABLET 325 MG: 325 PILL ORAL at 08:02

## 2023-11-09 RX ADMIN — PANTOPRAZOLE SODIUM 40 MG: 40 TABLET, DELAYED RELEASE ORAL at 08:02

## 2023-11-09 RX ADMIN — SODIUM CHLORIDE: 9 INJECTION, SOLUTION INTRAVENOUS at 04:29

## 2023-11-09 RX ADMIN — MODAFINIL 200 MG: 100 TABLET ORAL at 13:06

## 2023-11-09 RX ADMIN — LAMOTRIGINE 50 MG: 25 TABLET ORAL at 11:20

## 2023-11-09 ASSESSMENT — PAIN SCALES - GENERAL
PAINLEVEL_OUTOF10: 0

## 2023-11-09 ASSESSMENT — COGNITIVE AND FUNCTIONAL STATUS - GENERAL
DAILY_ACTIVITY_RAW_SCORE: 22
DAILY_ACTIVITY_CONVERTED_SCORE: 47.10
HELP NEEDED FOR BATHING: A LITTLE
BASIC_MOBILITY_RAW_SCORE: 24
HELP NEEDED FOR TOILETING: A LITTLE
DO YOU HAVE DIFFICULTY DRESSING OR BATHING: YES
DO YOU HAVE SERIOUS DIFFICULTY WALKING OR CLIMBING STAIRS: YES
BASIC_MOBILITY_CONVERTED_SCORE: 57.68

## 2023-11-09 ASSESSMENT — ACTIVITIES OF DAILY LIVING (ADL)
PRIOR_ADL_TOILETING: MODIFIED INDEPENDENT
HOME_MANAGEMENT_TIME_ENTRY: 15
EATING: INDEPENDENT
GROOMING: INDEPENDENT
PRIOR_ADL_BATHING: MODIFIED INDEPENDENT

## 2023-11-10 ENCOUNTER — CASE MANAGEMENT (OUTPATIENT)
Dept: CARE COORDINATION | Age: 68
End: 2023-11-10

## 2023-11-10 ASSESSMENT — ACTIVITIES OF DAILY LIVING (ADL): DME_IN_USE: CANE;WALKER;SHOWER CHAIR;GRAB BAR

## 2023-11-14 ENCOUNTER — OFFICE VISIT (OUTPATIENT)
Dept: PODIATRY | Age: 68
End: 2023-11-14

## 2023-11-14 DIAGNOSIS — M79.671 BILATERAL FOOT PAIN: ICD-10-CM

## 2023-11-14 DIAGNOSIS — M72.2 PLANTAR FASCIITIS OF RIGHT FOOT: ICD-10-CM

## 2023-11-14 DIAGNOSIS — M79.672 BILATERAL FOOT PAIN: ICD-10-CM

## 2023-11-14 DIAGNOSIS — M72.2 PLANTAR FASCIITIS OF LEFT FOOT: Primary | ICD-10-CM

## 2023-11-14 PROCEDURE — 99203 OFFICE O/P NEW LOW 30 MIN: CPT | Performed by: PODIATRIST

## 2023-11-14 PROCEDURE — 20550 NJX 1 TENDON SHEATH/LIGAMENT: CPT | Performed by: PODIATRIST

## 2023-11-14 RX ORDER — DEXAMETHASONE SODIUM PHOSPHATE 4 MG/ML
4 INJECTION, SOLUTION INTRA-ARTICULAR; INTRALESIONAL; INTRAMUSCULAR; INTRAVENOUS; SOFT TISSUE ONCE
Status: COMPLETED | OUTPATIENT
Start: 2023-11-14 | End: 2023-11-14

## 2023-11-14 RX ORDER — TRIAMCINOLONE ACETONIDE 40 MG/ML
40 INJECTION, SUSPENSION INTRA-ARTICULAR; INTRAMUSCULAR ONCE
Status: COMPLETED | OUTPATIENT
Start: 2023-11-14 | End: 2023-11-14

## 2023-11-14 RX ADMIN — TRIAMCINOLONE ACETONIDE 40 MG: 40 INJECTION, SUSPENSION INTRA-ARTICULAR; INTRAMUSCULAR at 09:39

## 2023-11-14 RX ADMIN — DEXAMETHASONE SODIUM PHOSPHATE 4 MG: 4 INJECTION, SOLUTION INTRA-ARTICULAR; INTRALESIONAL; INTRAMUSCULAR; INTRAVENOUS; SOFT TISSUE at 09:37

## 2023-11-14 RX ADMIN — TRIAMCINOLONE ACETONIDE 40 MG: 40 INJECTION, SUSPENSION INTRA-ARTICULAR; INTRAMUSCULAR at 09:38

## 2023-11-14 RX ADMIN — DEXAMETHASONE SODIUM PHOSPHATE 4 MG: 4 INJECTION, SOLUTION INTRA-ARTICULAR; INTRALESIONAL; INTRAMUSCULAR; INTRAVENOUS; SOFT TISSUE at 09:39

## 2023-11-16 ENCOUNTER — APPOINTMENT (OUTPATIENT)
Dept: BEHAVIORAL HEALTH | Age: 68
End: 2023-11-16

## 2023-11-16 ENCOUNTER — TELEPHONE (OUTPATIENT)
Dept: BEHAVIORAL HEALTH | Age: 68
End: 2023-11-16

## 2023-11-17 ENCOUNTER — CASE MANAGEMENT (OUTPATIENT)
Dept: CARE COORDINATION | Age: 68
End: 2023-11-17

## 2023-11-24 ENCOUNTER — CASE MANAGEMENT (OUTPATIENT)
Dept: CARE COORDINATION | Age: 68
End: 2023-11-24

## 2023-12-01 ENCOUNTER — CASE MANAGEMENT (OUTPATIENT)
Dept: CARE COORDINATION | Age: 68
End: 2023-12-01

## 2023-12-01 ASSESSMENT — SLEEP AND FATIGUE QUESTIONNAIRES: HOURS OF UNINTERRUPTED SLEEP: 5

## 2023-12-02 DIAGNOSIS — F31.12 BIPOLAR AFFECTIVE DISORDER, CURRENTLY MANIC, MODERATE (CMD): ICD-10-CM

## 2023-12-02 DIAGNOSIS — F41.1 GENERALIZED ANXIETY DISORDER: ICD-10-CM

## 2023-12-04 RX ORDER — DULOXETIN HYDROCHLORIDE 60 MG/1
60 CAPSULE, DELAYED RELEASE ORAL 2 TIMES DAILY
Qty: 180 CAPSULE | Refills: 3 | Status: SHIPPED | OUTPATIENT
Start: 2023-12-04

## 2023-12-06 ENCOUNTER — LAB SERVICES (OUTPATIENT)
Dept: LAB | Age: 68
End: 2023-12-06

## 2023-12-06 ENCOUNTER — OFFICE VISIT (OUTPATIENT)
Dept: FAMILY MEDICINE | Age: 68
End: 2023-12-06

## 2023-12-06 VITALS
HEIGHT: 65 IN | OXYGEN SATURATION: 99 % | SYSTOLIC BLOOD PRESSURE: 134 MMHG | BODY MASS INDEX: 24.83 KG/M2 | RESPIRATION RATE: 18 BRPM | DIASTOLIC BLOOD PRESSURE: 70 MMHG | HEART RATE: 71 BPM | WEIGHT: 149 LBS

## 2023-12-06 DIAGNOSIS — Z12.11 SCREENING FOR COLON CANCER: ICD-10-CM

## 2023-12-06 DIAGNOSIS — I10 PRIMARY HYPERTENSION: ICD-10-CM

## 2023-12-06 DIAGNOSIS — F31.61 BIPOLAR DISORDER, CURRENT EPISODE MIXED, MILD (CMD): ICD-10-CM

## 2023-12-06 DIAGNOSIS — K21.9 GASTROESOPHAGEAL REFLUX DISEASE WITHOUT ESOPHAGITIS: ICD-10-CM

## 2023-12-06 DIAGNOSIS — E86.0 DEHYDRATION: Primary | ICD-10-CM

## 2023-12-06 DIAGNOSIS — E86.0 DEHYDRATION: ICD-10-CM

## 2023-12-06 LAB
BASOPHILS # BLD: 0.1 K/MCL (ref 0–0.3)
BASOPHILS NFR BLD: 1 %
DEPRECATED RDW RBC: 51.1 FL (ref 39–50)
EOSINOPHIL # BLD: 0.1 K/MCL (ref 0–0.5)
EOSINOPHIL NFR BLD: 1 %
ERYTHROCYTE [DISTWIDTH] IN BLOOD: 12.3 % (ref 11–15)
HCT VFR BLD CALC: 43.7 % (ref 36–46.5)
HGB BLD-MCNC: 13.5 G/DL (ref 12–15.5)
IMM GRANULOCYTES # BLD AUTO: 0 K/MCL (ref 0–0.2)
IMM GRANULOCYTES # BLD: 0 %
LYMPHOCYTES # BLD: 2.7 K/MCL (ref 1–4)
LYMPHOCYTES NFR BLD: 40 %
MCH RBC QN AUTO: 34.4 PG (ref 26–34)
MCHC RBC AUTO-ENTMCNC: 30.9 G/DL (ref 32–36.5)
MCV RBC AUTO: 111.2 FL (ref 78–100)
MONOCYTES # BLD: 0.5 K/MCL (ref 0.3–0.9)
MONOCYTES NFR BLD: 7 %
NEUTROPHILS # BLD: 3.5 K/MCL (ref 1.8–7.7)
NEUTROPHILS NFR BLD: 51 %
NRBC BLD MANUAL-RTO: 0 /100 WBC
PLATELET # BLD AUTO: 290 K/MCL (ref 140–450)
RBC # BLD: 3.93 MIL/MCL (ref 4–5.2)
WBC # BLD: 6.9 K/MCL (ref 4.2–11)

## 2023-12-06 PROCEDURE — 85025 COMPLETE CBC W/AUTO DIFF WBC: CPT | Performed by: CLINICAL MEDICAL LABORATORY

## 2023-12-06 PROCEDURE — 80053 COMPREHEN METABOLIC PANEL: CPT | Performed by: CLINICAL MEDICAL LABORATORY

## 2023-12-06 PROCEDURE — 36415 COLL VENOUS BLD VENIPUNCTURE: CPT | Performed by: INTERNAL MEDICINE

## 2023-12-06 PROCEDURE — 99214 OFFICE O/P EST MOD 30 MIN: CPT | Performed by: FAMILY MEDICINE

## 2023-12-06 RX ORDER — KETOCONAZOLE 20 MG/G
CREAM TOPICAL
Qty: 30 G | Refills: 1 | Status: SHIPPED | OUTPATIENT
Start: 2023-12-06

## 2023-12-06 RX ORDER — BUPROPION HYDROCHLORIDE 150 MG/1
450 TABLET ORAL DAILY
Qty: 270 TABLET | Refills: 3 | Status: SHIPPED | OUTPATIENT
Start: 2023-12-06

## 2023-12-07 LAB
ALBUMIN SERPL-MCNC: 4.3 G/DL (ref 3.6–5.1)
ALBUMIN/GLOB SERPL: 1.5 {RATIO} (ref 1–2.4)
ALP SERPL-CCNC: 93 UNITS/L (ref 45–117)
ALT SERPL-CCNC: 30 UNITS/L
ANION GAP SERPL CALC-SCNC: 12 MMOL/L (ref 7–19)
AST SERPL-CCNC: 15 UNITS/L
BILIRUB SERPL-MCNC: 0.4 MG/DL (ref 0.2–1)
BUN SERPL-MCNC: 13 MG/DL (ref 6–20)
BUN/CREAT SERPL: 13 (ref 7–25)
CALCIUM SERPL-MCNC: 9.8 MG/DL (ref 8.4–10.2)
CHLORIDE SERPL-SCNC: 107 MMOL/L (ref 97–110)
CO2 SERPL-SCNC: 28 MMOL/L (ref 21–32)
CREAT SERPL-MCNC: 1.01 MG/DL (ref 0.51–0.95)
EGFRCR SERPLBLD CKD-EPI 2021: 60 ML/MIN/{1.73_M2}
FASTING DURATION TIME PATIENT: 5 HOURS (ref 0–999)
GLOBULIN SER-MCNC: 2.8 G/DL (ref 2–4)
GLUCOSE SERPL-MCNC: 90 MG/DL (ref 70–99)
POTASSIUM SERPL-SCNC: 5.7 MMOL/L (ref 3.4–5.1)
PROT SERPL-MCNC: 7.1 G/DL (ref 6.4–8.2)
SODIUM SERPL-SCNC: 141 MMOL/L (ref 135–145)

## 2023-12-08 ENCOUNTER — CASE MANAGEMENT (OUTPATIENT)
Dept: CARE COORDINATION | Age: 68
End: 2023-12-08

## 2023-12-27 DIAGNOSIS — K21.9 GASTROESOPHAGEAL REFLUX DISEASE, UNSPECIFIED WHETHER ESOPHAGITIS PRESENT: ICD-10-CM

## 2023-12-27 RX ORDER — ESOMEPRAZOLE MAGNESIUM 40 MG/1
40 CAPSULE, DELAYED RELEASE ORAL 2 TIMES DAILY
Qty: 180 CAPSULE | Refills: 3 | Status: SHIPPED | OUTPATIENT
Start: 2023-12-27

## 2023-12-31 RX ORDER — QUETIAPINE FUMARATE 25 MG/1
25 TABLET, FILM COATED ORAL NIGHTLY
Qty: 30 TABLET | Refills: 0 | Status: SHIPPED | OUTPATIENT
Start: 2023-12-31 | End: 2024-01-04 | Stop reason: SDUPTHER

## 2023-12-31 RX ORDER — FLUOXETINE 10 MG/1
10 CAPSULE ORAL DAILY
Qty: 30 CAPSULE | Refills: 0 | Status: SHIPPED | OUTPATIENT
Start: 2023-12-31 | End: 2024-01-04 | Stop reason: SDUPTHER

## 2024-01-01 DIAGNOSIS — M51.36 DDD (DEGENERATIVE DISC DISEASE), LUMBAR: ICD-10-CM

## 2024-01-01 DIAGNOSIS — Z86.39 HISTORY OF HYPOKALEMIA: ICD-10-CM

## 2024-01-02 ENCOUNTER — APPOINTMENT (OUTPATIENT)
Dept: PODIATRY | Age: 69
End: 2024-01-02

## 2024-01-02 RX ORDER — LEVOTHYROXINE SODIUM 0.05 MG/1
50 TABLET ORAL DAILY
Qty: 90 TABLET | Refills: 3 | Status: SHIPPED | OUTPATIENT
Start: 2024-01-02

## 2024-01-02 RX ORDER — IBUPROFEN 800 MG/1
800 TABLET ORAL EVERY 8 HOURS PRN
Qty: 90 TABLET | Refills: 2 | Status: SHIPPED | OUTPATIENT
Start: 2024-01-02

## 2024-01-02 RX ORDER — POTASSIUM CHLORIDE 1500 MG/1
TABLET, EXTENDED RELEASE ORAL
Qty: 90 TABLET | Refills: 1 | Status: SHIPPED | OUTPATIENT
Start: 2024-01-02

## 2024-01-04 ENCOUNTER — APPOINTMENT (OUTPATIENT)
Dept: BEHAVIORAL HEALTH | Age: 69
End: 2024-01-04

## 2024-01-04 DIAGNOSIS — F31.61 BIPOLAR DISORDER, CURRENT EPISODE MIXED, MILD (CMD): Primary | ICD-10-CM

## 2024-01-04 DIAGNOSIS — F41.1 GENERALIZED ANXIETY DISORDER: ICD-10-CM

## 2024-01-04 PROCEDURE — 99214 OFFICE O/P EST MOD 30 MIN: CPT | Performed by: NURSE PRACTITIONER

## 2024-01-04 RX ORDER — FLUOXETINE 10 MG/1
10 CAPSULE ORAL DAILY
Qty: 30 CAPSULE | Refills: 1 | Status: SHIPPED | OUTPATIENT
Start: 2024-01-04

## 2024-01-04 RX ORDER — QUETIAPINE FUMARATE 25 MG/1
25 TABLET, FILM COATED ORAL NIGHTLY
Qty: 30 TABLET | Refills: 1 | Status: SHIPPED | OUTPATIENT
Start: 2024-01-04

## 2024-01-09 ENCOUNTER — CASE MANAGEMENT (OUTPATIENT)
Dept: CARE COORDINATION | Age: 69
End: 2024-01-09

## 2024-01-17 ENCOUNTER — PATIENT OUTREACH (OUTPATIENT)
Dept: FAMILY MEDICINE | Age: 69
End: 2024-01-17

## 2024-01-17 ENCOUNTER — APPOINTMENT (OUTPATIENT)
Dept: FAMILY MEDICINE | Age: 69
End: 2024-01-17

## 2024-01-17 VITALS
BODY MASS INDEX: 25.49 KG/M2 | TEMPERATURE: 97.8 F | DIASTOLIC BLOOD PRESSURE: 60 MMHG | WEIGHT: 153 LBS | SYSTOLIC BLOOD PRESSURE: 110 MMHG | HEART RATE: 80 BPM | RESPIRATION RATE: 20 BRPM | HEIGHT: 65 IN

## 2024-01-17 DIAGNOSIS — J43.1 PANLOBULAR EMPHYSEMA (CMD): ICD-10-CM

## 2024-01-17 DIAGNOSIS — I50.32 CHRONIC DIASTOLIC HEART FAILURE (CMD): ICD-10-CM

## 2024-01-17 DIAGNOSIS — I10 PRIMARY HYPERTENSION: ICD-10-CM

## 2024-01-17 DIAGNOSIS — F31.61 BIPOLAR DISORDER, CURRENT EPISODE MIXED, MILD (CMD): Primary | ICD-10-CM

## 2024-01-17 DIAGNOSIS — S76.911A MUSCLE STRAIN OF RIGHT THIGH, INITIAL ENCOUNTER: ICD-10-CM

## 2024-01-17 DIAGNOSIS — N18.32 STAGE 3B CHRONIC KIDNEY DISEASE (CMD): ICD-10-CM

## 2024-01-17 PROCEDURE — 99214 OFFICE O/P EST MOD 30 MIN: CPT | Performed by: FAMILY MEDICINE

## 2024-01-17 PROCEDURE — G2211 COMPLEX E/M VISIT ADD ON: HCPCS | Performed by: FAMILY MEDICINE

## 2024-01-17 RX ORDER — TIZANIDINE 2 MG/1
2 TABLET ORAL EVERY 8 HOURS PRN
Qty: 20 TABLET | Refills: 1 | Status: SHIPPED | OUTPATIENT
Start: 2024-01-17

## 2024-01-22 ENCOUNTER — TELEPHONE (OUTPATIENT)
Dept: PULMONOLOGY | Age: 69
End: 2024-01-22

## 2024-01-22 DIAGNOSIS — G47.10 HYPERSOMNOLENCE: ICD-10-CM

## 2024-01-22 DIAGNOSIS — F31.12 BIPOLAR AFFECTIVE DISORDER, CURRENTLY MANIC, MODERATE (CMD): ICD-10-CM

## 2024-01-22 DIAGNOSIS — F41.1 GENERALIZED ANXIETY DISORDER: ICD-10-CM

## 2024-01-22 RX ORDER — MODAFINIL 200 MG/1
200 TABLET ORAL DAILY
Qty: 30 TABLET | Refills: 0 | OUTPATIENT
Start: 2024-01-22 | End: 2024-02-21

## 2024-01-22 RX ORDER — LAMOTRIGINE 25 MG/1
50 TABLET ORAL DAILY
Qty: 180 TABLET | Refills: 1 | Status: SHIPPED | OUTPATIENT
Start: 2024-01-22

## 2024-01-22 SDOH — ECONOMIC STABILITY: HOUSING INSECURITY: DO YOU HAVE STABLE HOUSING?: STABLE

## 2024-01-22 SDOH — ECONOMIC STABILITY: GENERAL

## 2024-01-22 SDOH — ECONOMIC STABILITY: HOUSING INSECURITY: WHAT IS YOUR LIVING SITUATION TODAY?: I HAVE A STEADY PLACE TO LIVE

## 2024-01-22 SDOH — ECONOMIC STABILITY: HOUSING INSECURITY: WHAT IS YOUR LIVING SITUATION TODAY?: APARTMENT

## 2024-01-22 SDOH — ECONOMIC STABILITY: HOUSING INSECURITY: DO YOU HAVE PROBLEMS WITH ANY OF THE FOLLOWING?: MOLD

## 2024-01-22 ASSESSMENT — ACTIVITIES OF DAILY LIVING (ADL)
AMBULATION: WITH DEVICE
BATHING: PARTIAL ASSIST
TOILETING: INDEPENDENT
EATING: INDEPENDENT
DRESSING: PARTIAL ASSIST
DRIVING: TOTAL ASSIST
GROOMING: INDEPENDENT

## 2024-01-31 ENCOUNTER — CASE MANAGEMENT (OUTPATIENT)
Dept: CARE COORDINATION | Age: 69
End: 2024-01-31

## 2024-01-31 ASSESSMENT — PATIENT HEALTH QUESTIONNAIRE - PHQ9
CLINICAL INTERPRETATION OF PHQ9 SCORE: MODERATELY SEVERE DEPRESSION
5. POOR APPETITE, WEIGHT LOSS, OR OVEREATING: NOT AT ALL
6. FEELING BAD ABOUT YOURSELF - OR THAT YOU ARE A FAILURE OR HAVE LET YOURSELF OR YOUR FAMILY DOWN: MORE THAN HALF THE DAYS
8. MOVING OR SPEAKING SO SLOWLY THAT OTHER PEOPLE COULD HAVE NOTICED. OR THE OPPOSITE, BEING SO FIGETY OR RESTLESS THAT YOU HAVE BEEN MOVING AROUND A LOT MORE THAN USUAL: SEVERAL DAYS
CLINICAL INTERPRETATION OF PHQ2 SCORE: FURTHER SCREENING NEEDED
7. TROUBLE CONCENTRATING ON THINGS, SUCH AS READING THE NEWSPAPER OR WATCHING TELEVISION: NEARLY EVERY DAY
1. LITTLE INTEREST OR PLEASURE IN DOING THINGS: MORE THAN HALF THE DAYS
SUM OF ALL RESPONSES TO PHQ9 QUESTIONS 1 AND 2: 4
SUM OF ALL RESPONSES TO PHQ9 QUESTIONS 1 AND 2: 4
4. FEELING TIRED OR HAVING LITTLE ENERGY: MORE THAN HALF THE DAYS
2. FEELING DOWN, DEPRESSED OR HOPELESS: MORE THAN HALF THE DAYS
10. IF YOU CHECKED OFF ANY PROBLEMS, HOW DIFFICULT HAVE THESE PROBLEMS MADE IT FOR YOU TO DO YOUR WORK, TAKE CARE OF THINGS AT HOME, OR GET ALONG WITH OTHER PEOPLE: VERY DIFFICULT
SUM OF ALL RESPONSES TO PHQ QUESTIONS 1-9: 15
9. THOUGHTS THAT YOU WOULD BE BETTER OFF DEAD, OR OF HURTING YOURSELF: NOT AT ALL
3. TROUBLE FALLING OR STAYING ASLEEP OR SLEEPING TOO MUCH: NEARLY EVERY DAY

## 2024-01-31 ASSESSMENT — SLEEP AND FATIGUE QUESTIONNAIRES
SLEEP DESCRIPTORS: WDL (ABSENCE OF SYMPTOMS)
HOURS OF UNINTERRUPTED SLEEP: 4

## 2024-02-12 ENCOUNTER — CASE MANAGEMENT (OUTPATIENT)
Dept: CARE COORDINATION | Age: 69
End: 2024-02-12

## 2024-02-12 DIAGNOSIS — G47.10 HYPERSOMNOLENCE: ICD-10-CM

## 2024-02-15 RX ORDER — MODAFINIL 200 MG/1
200 TABLET ORAL DAILY
Qty: 30 TABLET | Refills: 2 | Status: SHIPPED | OUTPATIENT
Start: 2024-02-15 | End: 2024-05-15

## 2024-02-15 SDOH — HEALTH STABILITY: MENTAL HEALTH: HOW MANY STANDARD DRINKS CONTAINING ALCOHOL DO YOU HAVE ON A TYPICAL DAY?: 3 OR 4

## 2024-02-15 SDOH — HEALTH STABILITY: MENTAL HEALTH: AUDIT TOTAL SCORE: 2

## 2024-02-15 SDOH — HEALTH STABILITY: MENTAL HEALTH: HOW OFTEN DO YOU HAVE 6 OR MORE DRINKS ON ONE OCCASION?: NEVER

## 2024-02-15 SDOH — ECONOMIC STABILITY: GENERAL

## 2024-02-15 SDOH — HEALTH STABILITY: MENTAL HEALTH: HOW OFTEN DO YOU HAVE A DRINK CONTAINING ALCOHOL?: MONTHLY OR LESS

## 2024-02-20 ENCOUNTER — TELEPHONE (OUTPATIENT)
Dept: FAMILY MEDICINE | Age: 69
End: 2024-02-20

## 2024-02-20 DIAGNOSIS — M51.36 DDD (DEGENERATIVE DISC DISEASE), LUMBAR: ICD-10-CM

## 2024-02-20 DIAGNOSIS — M54.16 LUMBAR RADICULOPATHY: Primary | ICD-10-CM

## 2024-02-20 DIAGNOSIS — M16.11 OSTEOARTHRITIS OF RIGHT HIP, UNSPECIFIED OSTEOARTHRITIS TYPE: ICD-10-CM

## 2024-03-04 ENCOUNTER — CASE MANAGEMENT (OUTPATIENT)
Dept: CARE COORDINATION | Age: 69
End: 2024-03-04

## 2024-03-05 ENCOUNTER — APPOINTMENT (OUTPATIENT)
Dept: BEHAVIORAL HEALTH | Age: 69
End: 2024-03-05

## 2024-03-05 DIAGNOSIS — F31.61 BIPOLAR DISORDER, CURRENT EPISODE MIXED, MILD  (CMD): ICD-10-CM

## 2024-03-05 RX ORDER — FLUOXETINE 10 MG/1
10 CAPSULE ORAL DAILY
Qty: 90 CAPSULE | Refills: 0 | Status: SHIPPED | OUTPATIENT
Start: 2024-03-05 | End: 2024-04-18 | Stop reason: SDUPTHER

## 2024-03-05 RX ORDER — QUETIAPINE FUMARATE 25 MG/1
25 TABLET, FILM COATED ORAL NIGHTLY
Qty: 90 TABLET | Refills: 0 | Status: SHIPPED | OUTPATIENT
Start: 2024-03-05 | End: 2024-04-18 | Stop reason: SDUPTHER

## 2024-03-05 RX ORDER — KETOCONAZOLE 20 MG/G
CREAM TOPICAL
Qty: 30 G | Refills: 1 | Status: SHIPPED | OUTPATIENT
Start: 2024-03-05

## 2024-03-06 DIAGNOSIS — M51.36 DDD (DEGENERATIVE DISC DISEASE), LUMBAR: ICD-10-CM

## 2024-03-06 RX ORDER — IBUPROFEN 800 MG/1
800 TABLET ORAL EVERY 8 HOURS PRN
Qty: 90 TABLET | Refills: 2 | Status: SHIPPED | OUTPATIENT
Start: 2024-03-06

## 2024-03-20 ENCOUNTER — CASE MANAGEMENT (OUTPATIENT)
Dept: CARE COORDINATION | Age: 69
End: 2024-03-20

## 2024-03-28 ENCOUNTER — CASE MANAGEMENT (OUTPATIENT)
Dept: CARE COORDINATION | Age: 69
End: 2024-03-28

## 2024-04-04 ENCOUNTER — CASE MANAGEMENT (OUTPATIENT)
Dept: CARE COORDINATION | Age: 69
End: 2024-04-04

## 2024-04-12 ENCOUNTER — CASE MANAGEMENT (OUTPATIENT)
Dept: CARE COORDINATION | Age: 69
End: 2024-04-12

## 2024-04-16 ENCOUNTER — TELEPHONE (OUTPATIENT)
Dept: PULMONOLOGY | Age: 69
End: 2024-04-16

## 2024-04-16 DIAGNOSIS — G47.10 HYPERSOMNOLENCE: ICD-10-CM

## 2024-04-16 RX ORDER — MODAFINIL 200 MG/1
200 TABLET ORAL DAILY
Qty: 30 TABLET | Refills: 2 | Status: SHIPPED | OUTPATIENT
Start: 2024-04-16 | End: 2024-04-17 | Stop reason: DRUGHIGH

## 2024-04-17 DIAGNOSIS — G47.10 HYPERSOMNOLENCE: ICD-10-CM

## 2024-04-17 RX ORDER — MODAFINIL 200 MG/1
200 TABLET ORAL 2 TIMES DAILY
Qty: 180 TABLET | Refills: 1 | Status: SHIPPED | OUTPATIENT
Start: 2024-04-17

## 2024-04-18 ENCOUNTER — APPOINTMENT (OUTPATIENT)
Dept: BEHAVIORAL HEALTH | Age: 69
End: 2024-04-18

## 2024-04-18 DIAGNOSIS — F31.61 BIPOLAR DISORDER, CURRENT EPISODE MIXED, MILD (CMD): Primary | ICD-10-CM

## 2024-04-18 PROCEDURE — 99214 OFFICE O/P EST MOD 30 MIN: CPT | Performed by: NURSE PRACTITIONER

## 2024-04-18 RX ORDER — FLUOXETINE 10 MG/1
10 CAPSULE ORAL DAILY
Qty: 90 CAPSULE | Refills: 1 | Status: SHIPPED | OUTPATIENT
Start: 2024-04-18

## 2024-04-18 RX ORDER — QUETIAPINE FUMARATE 25 MG/1
25 TABLET, FILM COATED ORAL NIGHTLY
Qty: 90 TABLET | Refills: 1 | Status: SHIPPED | OUTPATIENT
Start: 2024-04-18

## 2024-04-19 ENCOUNTER — CASE MANAGEMENT (OUTPATIENT)
Dept: CARE COORDINATION | Age: 69
End: 2024-04-19

## 2024-04-29 ENCOUNTER — TELEPHONE (OUTPATIENT)
Dept: PAIN MANAGEMENT | Age: 69
End: 2024-04-29

## 2024-04-29 DIAGNOSIS — M50.320 DEGENERATION OF INTERVERTEBRAL DISC OF MID-CERVICAL REGION, UNSPECIFIED SPINAL LEVEL: ICD-10-CM

## 2024-04-29 DIAGNOSIS — M54.12 CERVICAL RADICULOPATHY: ICD-10-CM

## 2024-04-29 DIAGNOSIS — M47.812 CERVICAL SPONDYLOSIS WITHOUT MYELOPATHY: ICD-10-CM

## 2024-04-29 DIAGNOSIS — M54.2 CERVICALGIA: Primary | ICD-10-CM

## 2024-05-03 ENCOUNTER — APPOINTMENT (OUTPATIENT)
Dept: FAMILY MEDICINE | Age: 69
End: 2024-05-03

## 2024-05-03 VITALS
BODY MASS INDEX: 26.16 KG/M2 | OXYGEN SATURATION: 95 % | SYSTOLIC BLOOD PRESSURE: 121 MMHG | HEART RATE: 75 BPM | HEIGHT: 65 IN | DIASTOLIC BLOOD PRESSURE: 70 MMHG | WEIGHT: 157 LBS | RESPIRATION RATE: 18 BRPM

## 2024-05-03 DIAGNOSIS — Z12.11 SCREENING FOR COLON CANCER: ICD-10-CM

## 2024-05-03 DIAGNOSIS — I10 PRIMARY HYPERTENSION: ICD-10-CM

## 2024-05-03 DIAGNOSIS — E03.9 ACQUIRED HYPOTHYROIDISM: ICD-10-CM

## 2024-05-03 DIAGNOSIS — K21.9 GASTROESOPHAGEAL REFLUX DISEASE WITHOUT ESOPHAGITIS: ICD-10-CM

## 2024-05-03 DIAGNOSIS — F51.01 PRIMARY INSOMNIA: ICD-10-CM

## 2024-05-03 DIAGNOSIS — M79.645 PAIN OF LEFT THUMB: Primary | ICD-10-CM

## 2024-05-06 ENCOUNTER — CASE MANAGEMENT (OUTPATIENT)
Dept: CARE COORDINATION | Age: 69
End: 2024-05-06

## 2024-05-09 ENCOUNTER — TELEPHONE (OUTPATIENT)
Dept: FAMILY MEDICINE | Age: 69
End: 2024-05-09

## 2024-05-09 DIAGNOSIS — S76.911A MUSCLE STRAIN OF RIGHT THIGH, INITIAL ENCOUNTER: ICD-10-CM

## 2024-05-09 RX ORDER — TIZANIDINE 2 MG/1
2 TABLET ORAL EVERY 8 HOURS PRN
Qty: 20 TABLET | Refills: 1 | Status: SHIPPED | OUTPATIENT
Start: 2024-05-09

## 2024-05-10 ASSESSMENT — ENCOUNTER SYMPTOMS: ALLEVIATING FACTORS: REST

## 2024-05-14 ENCOUNTER — APPOINTMENT (OUTPATIENT)
Dept: REHABILITATION | Age: 69
End: 2024-05-14
Attending: FAMILY MEDICINE

## 2024-05-14 RX ORDER — KETOCONAZOLE 20 MG/G
CREAM TOPICAL
Qty: 30 G | Refills: 1 | Status: SHIPPED | OUTPATIENT
Start: 2024-05-14

## 2024-05-17 ENCOUNTER — APPOINTMENT (OUTPATIENT)
Dept: REHABILITATION | Age: 69
End: 2024-05-17
Attending: FAMILY MEDICINE

## 2024-05-20 ENCOUNTER — HOSPITAL ENCOUNTER (OUTPATIENT)
Dept: GENERAL RADIOLOGY | Age: 69
Discharge: HOME | End: 2024-05-20
Attending: PAIN MEDICINE

## 2024-05-20 ENCOUNTER — CASE MANAGEMENT (OUTPATIENT)
Dept: CARE COORDINATION | Age: 69
End: 2024-05-20

## 2024-05-20 ENCOUNTER — HOSPITAL ENCOUNTER (OUTPATIENT)
Dept: PAIN MANAGEMENT | Age: 69
Discharge: HOME | End: 2024-05-20
Attending: PAIN MEDICINE

## 2024-05-20 VITALS
DIASTOLIC BLOOD PRESSURE: 60 MMHG | HEART RATE: 76 BPM | RESPIRATION RATE: 18 BRPM | OXYGEN SATURATION: 98 % | SYSTOLIC BLOOD PRESSURE: 130 MMHG

## 2024-05-20 DIAGNOSIS — M50.320 DEGENERATION OF INTERVERTEBRAL DISC OF MID-CERVICAL REGION, UNSPECIFIED SPINAL LEVEL: ICD-10-CM

## 2024-05-20 DIAGNOSIS — M54.12 CERVICAL RADICULOPATHY: ICD-10-CM

## 2024-05-20 DIAGNOSIS — M48.02 FORAMINAL STENOSIS OF CERVICAL REGION: ICD-10-CM

## 2024-05-20 DIAGNOSIS — M47.812 CERVICAL SPONDYLOSIS WITHOUT MYELOPATHY: Primary | ICD-10-CM

## 2024-05-20 PROCEDURE — 10005281 FL INTRAOPERATIVE C ARM NO REPORT

## 2024-05-20 PROCEDURE — 10000258 HB EPIDURAL INJ 1ST LEVEL W/ IMAGING

## 2024-05-20 PROCEDURE — 10002800 HB RX 250 W HCPCS: Performed by: PAIN MEDICINE

## 2024-05-20 PROCEDURE — 10002805 HB CONTRAST AGENT: Performed by: PAIN MEDICINE

## 2024-05-20 PROCEDURE — 10004187 HB COUNTER-VISIT CENSUS, OUTPATIENT

## 2024-05-20 RX ORDER — DEXAMETHASONE SODIUM PHOSPHATE 10 MG/ML
20 INJECTION, SOLUTION INTRAMUSCULAR; INTRAVENOUS ONCE
Status: COMPLETED | OUTPATIENT
Start: 2024-05-20 | End: 2024-05-20

## 2024-05-20 RX ADMIN — DEXAMETHASONE SODIUM PHOSPHATE 20 MG: 10 INJECTION INTRAMUSCULAR; INTRAVENOUS at 15:39

## 2024-05-20 RX ADMIN — IOHEXOL 3 ML: 180 INJECTION INTRAVENOUS at 15:39

## 2024-05-20 ASSESSMENT — PAIN SCALES - GENERAL: PAINLEVEL_OUTOF10: 4

## 2024-05-25 ENCOUNTER — LAB SERVICES (OUTPATIENT)
Dept: LAB | Age: 69
End: 2024-05-25

## 2024-05-25 DIAGNOSIS — Z12.11 ENCOUNTER FOR SCREENING FOR MALIGNANT NEOPLASM OF COLON: Primary | ICD-10-CM

## 2024-05-29 RX ORDER — LISINOPRIL 20 MG/1
20 TABLET ORAL DAILY
Qty: 90 TABLET | Refills: 3 | Status: SHIPPED | OUTPATIENT
Start: 2024-05-29

## 2024-05-30 ENCOUNTER — TELEPHONE (OUTPATIENT)
Dept: FAMILY MEDICINE | Age: 69
End: 2024-05-30

## 2024-05-30 ENCOUNTER — CASE MANAGEMENT (OUTPATIENT)
Dept: CARE COORDINATION | Age: 69
End: 2024-05-30

## 2024-06-04 ENCOUNTER — APPOINTMENT (OUTPATIENT)
Dept: FAMILY MEDICINE | Age: 69
End: 2024-06-04

## 2024-06-04 VITALS
HEART RATE: 74 BPM | BODY MASS INDEX: 26.16 KG/M2 | HEIGHT: 65 IN | DIASTOLIC BLOOD PRESSURE: 69 MMHG | OXYGEN SATURATION: 99 % | SYSTOLIC BLOOD PRESSURE: 130 MMHG | RESPIRATION RATE: 16 BRPM | WEIGHT: 157 LBS

## 2024-06-04 DIAGNOSIS — I10 PRIMARY HYPERTENSION: Primary | ICD-10-CM

## 2024-06-04 DIAGNOSIS — S76.911A MUSCLE STRAIN OF RIGHT THIGH, INITIAL ENCOUNTER: ICD-10-CM

## 2024-06-04 DIAGNOSIS — K21.9 GASTROESOPHAGEAL REFLUX DISEASE WITHOUT ESOPHAGITIS: ICD-10-CM

## 2024-06-04 DIAGNOSIS — Z12.11 SCREENING FOR COLON CANCER: ICD-10-CM

## 2024-06-04 DIAGNOSIS — F51.01 PRIMARY INSOMNIA: ICD-10-CM

## 2024-06-04 DIAGNOSIS — F31.61 BIPOLAR DISORDER, CURRENT EPISODE MIXED, MILD  (CMD): ICD-10-CM

## 2024-06-04 PROCEDURE — G2211 COMPLEX E/M VISIT ADD ON: HCPCS | Performed by: FAMILY MEDICINE

## 2024-06-04 PROCEDURE — 99214 OFFICE O/P EST MOD 30 MIN: CPT | Performed by: FAMILY MEDICINE

## 2024-06-04 RX ORDER — TIZANIDINE 4 MG/1
4 TABLET ORAL EVERY 8 HOURS PRN
Qty: 30 TABLET | Refills: 3 | Status: SHIPPED | OUTPATIENT
Start: 2024-06-04

## 2024-06-07 ENCOUNTER — LAB SERVICES (OUTPATIENT)
Dept: LAB | Age: 69
End: 2024-06-07

## 2024-06-07 DIAGNOSIS — Z12.11 SCREENING FOR COLON CANCER: ICD-10-CM

## 2024-06-10 ENCOUNTER — HOSPITAL ENCOUNTER (OUTPATIENT)
Dept: PAIN MANAGEMENT | Age: 69
Discharge: HOME | End: 2024-06-10
Attending: PAIN MEDICINE

## 2024-06-10 ENCOUNTER — HOSPITAL ENCOUNTER (OUTPATIENT)
Dept: GENERAL RADIOLOGY | Age: 69
Discharge: HOME | End: 2024-06-10
Attending: PAIN MEDICINE

## 2024-06-10 VITALS
DIASTOLIC BLOOD PRESSURE: 63 MMHG | OXYGEN SATURATION: 99 % | SYSTOLIC BLOOD PRESSURE: 138 MMHG | RESPIRATION RATE: 16 BRPM | HEART RATE: 74 BPM

## 2024-06-10 DIAGNOSIS — M50.320 DEGENERATION OF INTERVERTEBRAL DISC OF MID-CERVICAL REGION, UNSPECIFIED SPINAL LEVEL: Primary | ICD-10-CM

## 2024-06-10 DIAGNOSIS — M48.02 FORAMINAL STENOSIS OF CERVICAL REGION: ICD-10-CM

## 2024-06-10 DIAGNOSIS — M54.12 CERVICAL RADICULOPATHY: ICD-10-CM

## 2024-06-10 PROCEDURE — 10002800 HB RX 250 W HCPCS: Performed by: PAIN MEDICINE

## 2024-06-10 PROCEDURE — 10002805 HB CONTRAST AGENT: Performed by: PAIN MEDICINE

## 2024-06-10 PROCEDURE — 10000258 HB EPIDURAL INJ 1ST LEVEL W/ IMAGING

## 2024-06-10 PROCEDURE — 10004187 HB COUNTER-VISIT CENSUS, OUTPATIENT

## 2024-06-10 PROCEDURE — 10005281 FL INTRAOPERATIVE C ARM NO REPORT

## 2024-06-10 RX ORDER — DEXAMETHASONE SODIUM PHOSPHATE 10 MG/ML
20 INJECTION, SOLUTION INTRAMUSCULAR; INTRAVENOUS ONCE
Status: COMPLETED | OUTPATIENT
Start: 2024-06-10 | End: 2024-06-10

## 2024-06-10 RX ADMIN — DEXAMETHASONE SODIUM PHOSPHATE 20 MG: 10 INJECTION INTRAMUSCULAR; INTRAVENOUS at 08:14

## 2024-06-10 RX ADMIN — IOHEXOL 3 ML: 180 INJECTION INTRAVENOUS at 08:14

## 2024-06-10 ASSESSMENT — PAIN SCALES - GENERAL: PAINLEVEL_OUTOF10: 4

## 2024-06-12 ENCOUNTER — TELEPHONE (OUTPATIENT)
Dept: FAMILY MEDICINE | Age: 69
End: 2024-06-12

## 2024-06-13 ENCOUNTER — CASE MANAGEMENT (OUTPATIENT)
Dept: CARE COORDINATION | Age: 69
End: 2024-06-13

## 2024-06-13 LAB — HEMOCCULT STL QL: NEGATIVE

## 2024-07-03 ENCOUNTER — CASE MANAGEMENT (OUTPATIENT)
Dept: CARE COORDINATION | Age: 69
End: 2024-07-03

## 2024-07-03 ENCOUNTER — TELEPHONE (OUTPATIENT)
Dept: FAMILY MEDICINE | Age: 69
End: 2024-07-03

## 2024-07-03 DIAGNOSIS — Z86.39 HISTORY OF HYPOKALEMIA: ICD-10-CM

## 2024-07-03 DIAGNOSIS — F31.61 BIPOLAR DISORDER, CURRENT EPISODE MIXED, MILD  (CMD): ICD-10-CM

## 2024-07-03 DIAGNOSIS — J43.1 PANLOBULAR EMPHYSEMA  (CMD): ICD-10-CM

## 2024-07-03 RX ORDER — BUPROPION HYDROCHLORIDE 150 MG/1
450 TABLET ORAL DAILY
Qty: 270 TABLET | Refills: 3 | Status: SHIPPED | OUTPATIENT
Start: 2024-07-03

## 2024-07-03 RX ORDER — ALBUTEROL SULFATE 90 UG/1
2 AEROSOL, METERED RESPIRATORY (INHALATION) EVERY 4 HOURS PRN
Qty: 8.5 EACH | Refills: 3 | Status: SHIPPED | OUTPATIENT
Start: 2024-07-03

## 2024-07-03 RX ORDER — FLUTICASONE PROPIONATE AND SALMETEROL XINAFOATE 115; 21 UG/1; UG/1
2 AEROSOL, METERED RESPIRATORY (INHALATION) 2 TIMES DAILY
Qty: 12 EACH | Refills: 3 | Status: SHIPPED | OUTPATIENT
Start: 2024-07-03

## 2024-07-03 RX ORDER — POTASSIUM CHLORIDE 1500 MG/1
20 TABLET, EXTENDED RELEASE ORAL DAILY
Qty: 90 TABLET | Refills: 1 | Status: SHIPPED | OUTPATIENT
Start: 2024-07-03

## 2024-07-03 RX ORDER — LANOLIN ALCOHOL/MO/W.PET/CERES
100 CREAM (GRAM) TOPICAL DAILY
Qty: 90 TABLET | Refills: 3 | Status: SHIPPED | OUTPATIENT
Start: 2024-07-03

## 2024-07-03 RX ORDER — KETOCONAZOLE 20 MG/G
CREAM TOPICAL
Qty: 30 G | Refills: 1 | Status: SHIPPED | OUTPATIENT
Start: 2024-07-03

## 2024-07-11 DIAGNOSIS — G47.10 HYPERSOMNOLENCE: ICD-10-CM

## 2024-07-11 RX ORDER — MODAFINIL 200 MG/1
200 TABLET ORAL 2 TIMES DAILY
Qty: 180 TABLET | Refills: 1 | Status: SHIPPED | OUTPATIENT
Start: 2024-07-11

## 2024-07-25 ENCOUNTER — CASE MANAGEMENT (OUTPATIENT)
Dept: CARE COORDINATION | Age: 69
End: 2024-07-25

## 2024-08-05 ENCOUNTER — TELEPHONE (OUTPATIENT)
Dept: FAMILY MEDICINE | Age: 69
End: 2024-08-05

## 2024-08-08 DIAGNOSIS — J43.1 PANLOBULAR EMPHYSEMA  (CMD): ICD-10-CM

## 2024-08-08 RX ORDER — FLUTICASONE PROPIONATE AND SALMETEROL XINAFOATE 115; 21 UG/1; UG/1
2 AEROSOL, METERED RESPIRATORY (INHALATION) 2 TIMES DAILY
Qty: 12 EACH | Refills: 3 | Status: CANCELLED | OUTPATIENT
Start: 2024-08-08

## 2024-08-12 ENCOUNTER — APPOINTMENT (OUTPATIENT)
Dept: BEHAVIORAL HEALTH | Age: 69
End: 2024-08-12

## 2024-08-12 ENCOUNTER — TELEPHONE (OUTPATIENT)
Dept: BEHAVIORAL HEALTH | Age: 69
End: 2024-08-12

## 2024-08-14 ENCOUNTER — CASE MANAGEMENT (OUTPATIENT)
Dept: CARE COORDINATION | Age: 69
End: 2024-08-14

## 2024-08-28 ENCOUNTER — APPOINTMENT (OUTPATIENT)
Dept: BEHAVIORAL HEALTH | Age: 69
End: 2024-08-28

## 2024-09-16 ENCOUNTER — CASE MANAGEMENT (OUTPATIENT)
Dept: CARE COORDINATION | Age: 69
End: 2024-09-16

## 2024-09-20 ENCOUNTER — OFFICE VISIT (OUTPATIENT)
Dept: FAMILY MEDICINE | Age: 69
End: 2024-09-20

## 2024-09-20 ENCOUNTER — LAB SERVICES (OUTPATIENT)
Dept: LAB | Age: 69
End: 2024-09-20

## 2024-09-20 VITALS
HEIGHT: 65 IN | BODY MASS INDEX: 27.31 KG/M2 | HEART RATE: 80 BPM | RESPIRATION RATE: 18 BRPM | OXYGEN SATURATION: 100 % | SYSTOLIC BLOOD PRESSURE: 138 MMHG | DIASTOLIC BLOOD PRESSURE: 74 MMHG | WEIGHT: 163.9 LBS

## 2024-09-20 DIAGNOSIS — Z23 NEED FOR VACCINATION: ICD-10-CM

## 2024-09-20 DIAGNOSIS — Z87.891 HISTORY OF TOBACCO USE: ICD-10-CM

## 2024-09-20 DIAGNOSIS — Z00.00 MEDICARE ANNUAL WELLNESS VISIT, SUBSEQUENT: Primary | ICD-10-CM

## 2024-09-20 DIAGNOSIS — I10 PRIMARY HYPERTENSION: ICD-10-CM

## 2024-09-20 DIAGNOSIS — K21.9 GASTROESOPHAGEAL REFLUX DISEASE WITHOUT ESOPHAGITIS: ICD-10-CM

## 2024-09-20 DIAGNOSIS — F31.61 BIPOLAR DISORDER, CURRENT EPISODE MIXED, MILD  (CMD): ICD-10-CM

## 2024-09-20 DIAGNOSIS — M79.89 LEG SWELLING: ICD-10-CM

## 2024-09-20 DIAGNOSIS — Z87.891 HISTORY OF SMOKING: ICD-10-CM

## 2024-09-20 DIAGNOSIS — Z74.09 IMPAIRED FUNCTIONAL MOBILITY, BALANCE, AND ENDURANCE: ICD-10-CM

## 2024-09-20 LAB
ALBUMIN SERPL-MCNC: 3.8 G/DL (ref 3.4–5)
ALBUMIN/GLOB SERPL: 1.4 {RATIO} (ref 1–2.4)
ALP SERPL-CCNC: 104 UNITS/L (ref 45–117)
ALT SERPL-CCNC: 21 UNITS/L
ANION GAP SERPL CALC-SCNC: 9 MMOL/L (ref 7–19)
AST SERPL-CCNC: 14 UNITS/L
BASOPHILS # BLD: 0 K/MCL (ref 0–0.3)
BASOPHILS NFR BLD: 1 %
BILIRUB SERPL-MCNC: 0.4 MG/DL (ref 0.2–1)
BUN SERPL-MCNC: 12 MG/DL (ref 6–20)
BUN/CREAT SERPL: 12 (ref 7–25)
CALCIUM SERPL-MCNC: 8.7 MG/DL (ref 8.4–10.2)
CHLORIDE SERPL-SCNC: 106 MMOL/L (ref 97–110)
CHOLEST SERPL-MCNC: 188 MG/DL
CHOLEST/HDLC SERPL: 2.4 {RATIO}
CO2 SERPL-SCNC: 29 MMOL/L (ref 21–32)
CREAT SERPL-MCNC: 1.03 MG/DL (ref 0.51–0.95)
DEPRECATED RDW RBC: 48 FL (ref 39–50)
EGFRCR SERPLBLD CKD-EPI 2021: 58 ML/MIN/{1.73_M2}
EOSINOPHIL # BLD: 0.1 K/MCL (ref 0–0.5)
EOSINOPHIL NFR BLD: 2 %
ERYTHROCYTE [DISTWIDTH] IN BLOOD: 11.9 % (ref 11–15)
FASTING DURATION TIME PATIENT: 1 HOURS (ref 0–999)
GLOBULIN SER-MCNC: 2.8 G/DL (ref 2–4)
GLUCOSE SERPL-MCNC: 90 MG/DL (ref 70–99)
HCT VFR BLD CALC: 40.3 % (ref 36–46.5)
HDLC SERPL-MCNC: 79 MG/DL
HGB BLD-MCNC: 12.7 G/DL (ref 12–15.5)
IMM GRANULOCYTES # BLD AUTO: 0 K/MCL (ref 0–0.2)
IMM GRANULOCYTES # BLD: 0 %
LDLC SERPL CALC-MCNC: 77 MG/DL
LYMPHOCYTES # BLD: 1.7 K/MCL (ref 1–4)
LYMPHOCYTES NFR BLD: 33 %
MCH RBC QN AUTO: 34.6 PG (ref 26–34)
MCHC RBC AUTO-ENTMCNC: 31.5 G/DL (ref 32–36.5)
MCV RBC AUTO: 109.8 FL (ref 78–100)
MONOCYTES # BLD: 0.4 K/MCL (ref 0.3–0.9)
MONOCYTES NFR BLD: 8 %
NEUTROPHILS # BLD: 2.9 K/MCL (ref 1.8–7.7)
NEUTROPHILS NFR BLD: 56 %
NONHDLC SERPL-MCNC: 109 MG/DL
NRBC BLD MANUAL-RTO: 0 /100 WBC
PLATELET # BLD AUTO: 254 K/MCL (ref 140–450)
POTASSIUM SERPL-SCNC: 5.3 MMOL/L (ref 3.4–5.1)
PROT SERPL-MCNC: 6.6 G/DL (ref 6.4–8.2)
RBC # BLD: 3.67 MIL/MCL (ref 4–5.2)
SODIUM SERPL-SCNC: 139 MMOL/L (ref 135–145)
TRIGL SERPL-MCNC: 158 MG/DL
TSH SERPL-ACNC: 1.55 MCUNITS/ML (ref 0.35–5)
WBC # BLD: 5 K/MCL (ref 4.2–11)

## 2024-09-20 PROCEDURE — 85025 COMPLETE CBC W/AUTO DIFF WBC: CPT | Performed by: CLINICAL MEDICAL LABORATORY

## 2024-09-20 PROCEDURE — 80053 COMPREHEN METABOLIC PANEL: CPT | Performed by: CLINICAL MEDICAL LABORATORY

## 2024-09-20 PROCEDURE — 81003 URINALYSIS AUTO W/O SCOPE: CPT | Performed by: CLINICAL MEDICAL LABORATORY

## 2024-09-20 PROCEDURE — 36415 COLL VENOUS BLD VENIPUNCTURE: CPT | Performed by: INTERNAL MEDICINE

## 2024-09-20 PROCEDURE — 84443 ASSAY THYROID STIM HORMONE: CPT | Performed by: CLINICAL MEDICAL LABORATORY

## 2024-09-20 PROCEDURE — 80061 LIPID PANEL: CPT | Performed by: CLINICAL MEDICAL LABORATORY

## 2024-09-20 RX ORDER — FUROSEMIDE 40 MG
40 TABLET ORAL 2 TIMES DAILY
Qty: 180 TABLET | Refills: 3 | Status: SHIPPED | OUTPATIENT
Start: 2024-09-20

## 2024-09-20 ASSESSMENT — PATIENT HEALTH QUESTIONNAIRE - PHQ9
SUM OF ALL RESPONSES TO PHQ9 QUESTIONS 1 AND 2: 6
5. POOR APPETITE OR OVEREATING: SEVERAL DAYS
7. TROUBLE CONCENTRATING ON THINGS, SUCH AS READING THE NEWSPAPER OR WATCHING TELEVISION: NEARLY EVERY DAY
2. FEELING DOWN, DEPRESSED OR HOPELESS: NEARLY EVERY DAY
SUM OF ALL RESPONSES TO PHQ QUESTIONS 1-9: 18
1. LITTLE INTEREST OR PLEASURE IN DOING THINGS: NEARLY EVERY DAY
CLINICAL INTERPRETATION OF PHQ2 SCORE: FURTHER SCREENING NEEDED
9. THOUGHTS THAT YOU WOULD BE BETTER OFF DEAD, OR OF HURTING YOURSELF: NOT AT ALL
CLINICAL INTERPRETATION OF PHQ2 SCORE: 6
8. MOVING OR SPEAKING SO SLOWLY THAT OTHER PEOPLE COULD HAVE NOTICED. OR THE OPPOSITE, BEING SO FIGETY OR RESTLESS THAT YOU HAVE BEEN MOVING AROUND A LOT MORE THAN USUAL: NOT AT ALL
6. FEELING BAD ABOUT YOURSELF - OR THAT YOU ARE A FAILURE OR HAVE LET YOURSELF OR YOUR FAMILY DOWN: NEARLY EVERY DAY
3. TROUBLE FALLING OR STAYING ASLEEP OR SLEEPING TOO MUCH: MORE THAN HALF THE DAYS
CLINICAL INTERPRETATION OF PHQ9 SCORE: MODERATELY SEVERE DEPRESSION
4. FEELING TIRED OR HAVING LITTLE ENERGY: NEARLY EVERY DAY
SUM OF ALL RESPONSES TO PHQ QUESTIONS 3-9. PHQ2 SCORE WAS 6: 18

## 2024-09-21 LAB
APPEARANCE UR: CLEAR
BILIRUB UR QL STRIP: NEGATIVE
COLOR UR: NORMAL
GLUCOSE UR STRIP-MCNC: NEGATIVE MG/DL
HGB UR QL STRIP: NEGATIVE
KETONES UR STRIP-MCNC: NEGATIVE MG/DL
LEUKOCYTE ESTERASE UR QL STRIP: NEGATIVE
NITRITE UR QL STRIP: NEGATIVE
PH UR STRIP: 7 [PH] (ref 5–7)
PROT UR STRIP-MCNC: NEGATIVE MG/DL
SP GR UR STRIP: 1.01 (ref 1–1.03)
UROBILINOGEN UR STRIP-MCNC: 0.2 MG/DL

## 2024-09-23 ENCOUNTER — APPOINTMENT (OUTPATIENT)
Dept: BEHAVIORAL HEALTH | Age: 69
End: 2024-09-23

## 2024-09-23 VITALS — BODY MASS INDEX: 27.32 KG/M2 | HEIGHT: 65 IN | WEIGHT: 164 LBS

## 2024-09-23 DIAGNOSIS — F31.61 BIPOLAR DISORDER, CURRENT EPISODE MIXED, MILD  (CMD): Primary | ICD-10-CM

## 2024-09-23 DIAGNOSIS — F51.05 INSOMNIA DUE TO MENTAL DISORDER: ICD-10-CM

## 2024-09-23 DIAGNOSIS — F41.1 GENERALIZED ANXIETY DISORDER: ICD-10-CM

## 2024-09-23 PROCEDURE — 90792 PSYCH DIAG EVAL W/MED SRVCS: CPT | Performed by: NURSE PRACTITIONER

## 2024-09-23 RX ORDER — QUETIAPINE FUMARATE 25 MG/1
25 TABLET, FILM COATED ORAL NIGHTLY
Qty: 90 TABLET | Refills: 1 | Status: SHIPPED | OUTPATIENT
Start: 2024-09-23

## 2024-09-24 ENCOUNTER — TELEPHONE (OUTPATIENT)
Dept: FAMILY MEDICINE | Age: 69
End: 2024-09-24

## 2024-10-02 ENCOUNTER — APPOINTMENT (OUTPATIENT)
Dept: REHABILITATION | Age: 69
End: 2024-10-02
Attending: FAMILY MEDICINE

## 2024-10-02 DIAGNOSIS — F41.1 GENERALIZED ANXIETY DISORDER: ICD-10-CM

## 2024-10-02 DIAGNOSIS — F31.12 BIPOLAR AFFECTIVE DISORDER, CURRENTLY MANIC, MODERATE  (CMD): ICD-10-CM

## 2024-10-02 RX ORDER — LAMOTRIGINE 25 MG/1
50 TABLET ORAL DAILY
Qty: 180 TABLET | Refills: 1 | Status: SHIPPED | OUTPATIENT
Start: 2024-10-02

## 2024-10-07 ENCOUNTER — TELEPHONE (OUTPATIENT)
Dept: PULMONOLOGY | Age: 69
End: 2024-10-07

## 2024-10-07 ENCOUNTER — APPOINTMENT (OUTPATIENT)
Dept: PULMONOLOGY | Age: 69
End: 2024-10-07
Attending: INTERNAL MEDICINE

## 2024-10-07 DIAGNOSIS — M51.369 DDD (DEGENERATIVE DISC DISEASE), LUMBAR: ICD-10-CM

## 2024-10-07 RX ORDER — IBUPROFEN 800 MG/1
800 TABLET, FILM COATED ORAL EVERY 8 HOURS PRN
Qty: 90 TABLET | Refills: 2 | Status: SHIPPED | OUTPATIENT
Start: 2024-10-07

## 2024-10-09 ENCOUNTER — HOSPITAL ENCOUNTER (OUTPATIENT)
Dept: REHABILITATION | Age: 69
Discharge: STILL ADMITTED OR ADMINISTRATIVE | End: 2024-10-09
Attending: FAMILY MEDICINE

## 2024-10-09 PROCEDURE — 10004173 HB COUNTER-THERAPY VISIT PT: Performed by: PHYSICAL THERAPIST

## 2024-10-09 PROCEDURE — 97110 THERAPEUTIC EXERCISES: CPT | Performed by: PHYSICAL THERAPIST

## 2024-10-09 PROCEDURE — 97162 PT EVAL MOD COMPLEX 30 MIN: CPT | Performed by: PHYSICAL THERAPIST

## 2024-10-09 ASSESSMENT — ENCOUNTER SYMPTOMS
ALLEVIATING FACTORS: REST
QUALITY: POPPING / CLICKING

## 2024-10-11 ENCOUNTER — APPOINTMENT (OUTPATIENT)
Dept: PULMONOLOGY | Age: 69
End: 2024-10-11
Attending: INTERNAL MEDICINE

## 2024-10-16 ENCOUNTER — APPOINTMENT (OUTPATIENT)
Dept: REHABILITATION | Age: 69
End: 2024-10-16
Attending: FAMILY MEDICINE

## 2024-10-16 PROCEDURE — 97110 THERAPEUTIC EXERCISES: CPT | Performed by: PHYSICAL THERAPIST

## 2024-10-16 PROCEDURE — 10004173 HB COUNTER-THERAPY VISIT PT: Performed by: PHYSICAL THERAPIST

## 2024-10-16 PROCEDURE — 97112 NEUROMUSCULAR REEDUCATION: CPT | Performed by: PHYSICAL THERAPIST

## 2024-10-16 ASSESSMENT — ENCOUNTER SYMPTOMS: PAIN SEVERITY NOW: 4

## 2024-10-22 ENCOUNTER — APPOINTMENT (OUTPATIENT)
Dept: REHABILITATION | Age: 69
End: 2024-10-22
Attending: FAMILY MEDICINE

## 2024-10-22 PROCEDURE — 10004173 HB COUNTER-THERAPY VISIT PT: Performed by: PHYSICAL THERAPIST

## 2024-10-22 PROCEDURE — 97112 NEUROMUSCULAR REEDUCATION: CPT | Performed by: PHYSICAL THERAPIST

## 2024-10-22 PROCEDURE — 97110 THERAPEUTIC EXERCISES: CPT | Performed by: PHYSICAL THERAPIST

## 2024-10-22 ASSESSMENT — ENCOUNTER SYMPTOMS: PAIN SEVERITY NOW: 3

## 2024-10-30 ENCOUNTER — APPOINTMENT (OUTPATIENT)
Dept: FAMILY MEDICINE | Age: 69
End: 2024-10-30

## 2024-10-30 ENCOUNTER — APPOINTMENT (OUTPATIENT)
Dept: REHABILITATION | Age: 69
End: 2024-10-30
Attending: FAMILY MEDICINE

## 2024-11-04 ENCOUNTER — APPOINTMENT (OUTPATIENT)
Dept: REHABILITATION | Age: 69
End: 2024-11-04
Attending: FAMILY MEDICINE

## 2024-11-13 ENCOUNTER — HOSPITAL ENCOUNTER (OUTPATIENT)
Dept: REHABILITATION | Age: 69
Discharge: STILL ADMITTED OR ADMINISTRATIVE | End: 2024-11-13
Attending: FAMILY MEDICINE

## 2024-11-13 PROCEDURE — 97112 NEUROMUSCULAR REEDUCATION: CPT | Performed by: PHYSICAL THERAPIST

## 2024-11-13 PROCEDURE — 97110 THERAPEUTIC EXERCISES: CPT | Performed by: PHYSICAL THERAPIST

## 2024-11-13 PROCEDURE — 10004173 HB COUNTER-THERAPY VISIT PT: Performed by: PHYSICAL THERAPIST

## 2024-11-20 ENCOUNTER — APPOINTMENT (OUTPATIENT)
Dept: REHABILITATION | Age: 69
End: 2024-11-20
Attending: FAMILY MEDICINE

## 2024-11-25 DIAGNOSIS — F41.1 GENERALIZED ANXIETY DISORDER: ICD-10-CM

## 2024-11-25 DIAGNOSIS — F31.12 BIPOLAR AFFECTIVE DISORDER, CURRENTLY MANIC, MODERATE  (CMD): ICD-10-CM

## 2024-11-25 RX ORDER — DULOXETIN HYDROCHLORIDE 60 MG/1
60 CAPSULE, DELAYED RELEASE ORAL 2 TIMES DAILY
Qty: 180 CAPSULE | Refills: 3 | Status: SHIPPED | OUTPATIENT
Start: 2024-11-25

## 2024-11-26 ENCOUNTER — APPOINTMENT (OUTPATIENT)
Dept: FAMILY MEDICINE | Age: 69
End: 2024-11-26

## 2024-11-27 ENCOUNTER — APPOINTMENT (OUTPATIENT)
Dept: REHABILITATION | Age: 69
End: 2024-11-27
Attending: FAMILY MEDICINE

## 2024-12-04 ENCOUNTER — APPOINTMENT (OUTPATIENT)
Dept: PULMONOLOGY | Age: 69
End: 2024-12-04
Attending: INTERNAL MEDICINE

## 2024-12-26 DIAGNOSIS — G47.10 HYPERSOMNOLENCE: ICD-10-CM

## 2024-12-26 RX ORDER — MODAFINIL 200 MG/1
200 TABLET ORAL 2 TIMES DAILY
Qty: 180 TABLET | Refills: 3 | Status: SHIPPED | OUTPATIENT
Start: 2024-12-26

## 2025-01-08 ENCOUNTER — V-VISIT (OUTPATIENT)
Dept: FAMILY MEDICINE | Age: 70
End: 2025-01-08

## 2025-01-08 ENCOUNTER — TELEPHONE (OUTPATIENT)
Dept: PAIN MANAGEMENT | Age: 70
End: 2025-01-08

## 2025-01-08 ENCOUNTER — TELEPHONE (OUTPATIENT)
Dept: FAMILY MEDICINE | Age: 70
End: 2025-01-08

## 2025-01-08 DIAGNOSIS — M54.12 CERVICAL RADICULOPATHY: Primary | ICD-10-CM

## 2025-01-08 DIAGNOSIS — J06.9 VIRAL URI: ICD-10-CM

## 2025-01-08 DIAGNOSIS — J01.00 ACUTE NON-RECURRENT MAXILLARY SINUSITIS: Primary | ICD-10-CM

## 2025-01-08 DIAGNOSIS — M48.062 LUMBAR STENOSIS WITH NEUROGENIC CLAUDICATION: ICD-10-CM

## 2025-01-08 DIAGNOSIS — M47.812 CERVICAL SPONDYLOSIS WITHOUT MYELOPATHY: ICD-10-CM

## 2025-01-08 DIAGNOSIS — M54.2 CERVICALGIA: ICD-10-CM

## 2025-01-08 DIAGNOSIS — M48.02 FORAMINAL STENOSIS OF CERVICAL REGION: ICD-10-CM

## 2025-01-08 DIAGNOSIS — M54.16 LUMBAR RADICULOPATHY: ICD-10-CM

## 2025-01-08 PROCEDURE — 99213 OFFICE O/P EST LOW 20 MIN: CPT | Performed by: FAMILY MEDICINE

## 2025-01-08 RX ORDER — BENZONATATE 200 MG/1
200 CAPSULE ORAL 3 TIMES DAILY PRN
Qty: 20 CAPSULE | Refills: 0 | Status: SHIPPED | OUTPATIENT
Start: 2025-01-08

## 2025-01-08 RX ORDER — AMOXICILLIN 875 MG/1
875 TABLET, COATED ORAL 2 TIMES DAILY
Qty: 20 TABLET | Refills: 0 | Status: SHIPPED | OUTPATIENT
Start: 2025-01-08

## 2025-01-08 SDOH — ECONOMIC STABILITY: FOOD INSECURITY: WITHIN THE PAST 12 MONTHS, THE FOOD YOU BOUGHT JUST DIDN'T LAST AND YOU DIDN'T HAVE MONEY TO GET MORE.: SOMETIMES TRUE

## 2025-01-08 SDOH — ECONOMIC STABILITY: TRANSPORTATION INSECURITY
IN THE PAST 12 MONTHS, HAS LACK OF RELIABLE TRANSPORTATION KEPT YOU FROM MEDICAL APPOINTMENTS, MEETINGS, WORK OR FROM GETTING THINGS NEEDED FOR DAILY LIVING?: NO

## 2025-01-08 SDOH — ECONOMIC STABILITY: HOUSING INSECURITY: WHAT IS YOUR LIVING SITUATION TODAY?: I HAVE A STEADY PLACE TO LIVE

## 2025-01-08 SDOH — ECONOMIC STABILITY: FOOD INSECURITY: WITHIN THE PAST 12 MONTHS, THE FOOD YOU BOUGHT JUST DIDN'T LAST AND YOU DIDN'T HAVE MONEY TO GET MORE.: PATIENT DECLINED

## 2025-01-08 SDOH — ECONOMIC STABILITY: HOUSING INSECURITY: DO YOU HAVE PROBLEMS WITH ANY OF THE FOLLOWING?: PATIENT DECLINED

## 2025-01-08 ASSESSMENT — SOCIAL DETERMINANTS OF HEALTH (SDOH): IN THE PAST 12 MONTHS, HAS THE ELECTRIC, GAS, OIL, OR WATER COMPANY THREATENED TO SHUT OFF SERVICE IN YOUR HOME?: NO

## 2025-01-10 DIAGNOSIS — K21.9 GASTROESOPHAGEAL REFLUX DISEASE, UNSPECIFIED WHETHER ESOPHAGITIS PRESENT: ICD-10-CM

## 2025-01-10 RX ORDER — ESOMEPRAZOLE MAGNESIUM 40 MG/1
40 CAPSULE, DELAYED RELEASE ORAL 2 TIMES DAILY
Qty: 180 CAPSULE | Refills: 3 | Status: SHIPPED | OUTPATIENT
Start: 2025-01-10

## 2025-01-13 ENCOUNTER — HOSPITAL ENCOUNTER (OUTPATIENT)
Dept: GENERAL RADIOLOGY | Age: 70
Discharge: HOME | End: 2025-01-13
Attending: PAIN MEDICINE

## 2025-01-13 ENCOUNTER — HOSPITAL ENCOUNTER (OUTPATIENT)
Dept: PAIN MANAGEMENT | Age: 70
Discharge: HOME | End: 2025-01-13
Attending: PAIN MEDICINE

## 2025-01-13 VITALS
SYSTOLIC BLOOD PRESSURE: 155 MMHG | OXYGEN SATURATION: 98 % | RESPIRATION RATE: 17 BRPM | DIASTOLIC BLOOD PRESSURE: 70 MMHG | HEART RATE: 80 BPM | TEMPERATURE: 97.4 F

## 2025-01-13 DIAGNOSIS — M50.320 DEGENERATION OF INTERVERTEBRAL DISC OF MID-CERVICAL REGION, UNSPECIFIED SPINAL LEVEL: Primary | ICD-10-CM

## 2025-01-13 DIAGNOSIS — M48.02 FORAMINAL STENOSIS OF CERVICAL REGION: ICD-10-CM

## 2025-01-13 DIAGNOSIS — M54.12 CERVICAL RADICULOPATHY: ICD-10-CM

## 2025-01-13 PROCEDURE — 10002800 HB RX 250 W HCPCS: Performed by: PAIN MEDICINE

## 2025-01-13 PROCEDURE — 10004187 HB COUNTER-VISIT CENSUS, OUTPATIENT

## 2025-01-13 PROCEDURE — 10005281 FL INTRAOPERATIVE C ARM NO REPORT

## 2025-01-13 PROCEDURE — 10002805 HB CONTRAST AGENT: Performed by: PAIN MEDICINE

## 2025-01-13 PROCEDURE — 10000258 HB EPIDURAL INJ 1ST LEVEL W/ IMAGING

## 2025-01-13 RX ORDER — DEXAMETHASONE SODIUM PHOSPHATE 10 MG/ML
20 INJECTION, SOLUTION INTRAMUSCULAR; INTRAVENOUS ONCE
Status: COMPLETED | OUTPATIENT
Start: 2025-01-13 | End: 2025-01-13

## 2025-01-13 RX ADMIN — DEXAMETHASONE SODIUM PHOSPHATE 20 MG: 10 INJECTION INTRAMUSCULAR; INTRAVENOUS at 14:13

## 2025-01-13 RX ADMIN — IOHEXOL 3 ML: 180 INJECTION INTRAVENOUS at 14:13

## 2025-01-13 ASSESSMENT — PAIN SCALES - GENERAL
PAINLEVEL_OUTOF10: 10
PAINLEVEL_OUTOF10: 8

## 2025-01-15 DIAGNOSIS — S76.911A MUSCLE STRAIN OF RIGHT THIGH, INITIAL ENCOUNTER: ICD-10-CM

## 2025-01-17 RX ORDER — KETOCONAZOLE 20 MG/G
CREAM TOPICAL
Qty: 30 G | Refills: 1 | Status: SHIPPED | OUTPATIENT
Start: 2025-01-17

## 2025-01-22 RX ORDER — LEVOTHYROXINE SODIUM 50 UG/1
50 TABLET ORAL DAILY
Qty: 90 TABLET | Refills: 3 | Status: SHIPPED | OUTPATIENT
Start: 2025-01-22

## 2025-01-25 DIAGNOSIS — J43.1 PANLOBULAR EMPHYSEMA  (CMD): ICD-10-CM

## 2025-01-27 RX ORDER — FLUTICASONE PROPIONATE AND SALMETEROL XINAFOATE 115; 21 UG/1; UG/1
AEROSOL, METERED RESPIRATORY (INHALATION)
Qty: 12 G | Refills: 3 | Status: SHIPPED | OUTPATIENT
Start: 2025-01-27

## 2025-01-28 DIAGNOSIS — J43.1 PANLOBULAR EMPHYSEMA  (CMD): ICD-10-CM

## 2025-01-28 RX ORDER — ALBUTEROL SULFATE 90 UG/1
2 AEROSOL, METERED RESPIRATORY (INHALATION) EVERY 4 HOURS PRN
Qty: 18 G | Refills: 1 | Status: SHIPPED | OUTPATIENT
Start: 2025-01-28

## 2025-02-05 ENCOUNTER — BEHAVIORAL HEALTH (OUTPATIENT)
Dept: BEHAVIORAL HEALTH | Age: 70
End: 2025-02-05

## 2025-02-05 DIAGNOSIS — F51.01 PRIMARY INSOMNIA: ICD-10-CM

## 2025-02-05 DIAGNOSIS — F31.61 BIPOLAR DISORDER, CURRENT EPISODE MIXED, MILD  (CMD): Primary | ICD-10-CM

## 2025-02-05 DIAGNOSIS — F41.1 GENERALIZED ANXIETY DISORDER: ICD-10-CM

## 2025-02-05 PROCEDURE — 99214 OFFICE O/P EST MOD 30 MIN: CPT | Performed by: NURSE PRACTITIONER

## 2025-02-05 RX ORDER — TEMAZEPAM 7.5 MG/1
7.5 CAPSULE ORAL NIGHTLY PRN
Qty: 30 CAPSULE | Refills: 3 | Status: SHIPPED | OUTPATIENT
Start: 2025-02-05

## 2025-02-05 RX ORDER — LAMOTRIGINE 25 MG/1
50 TABLET ORAL DAILY
Qty: 180 TABLET | Refills: 1 | Status: SHIPPED | OUTPATIENT
Start: 2025-02-05

## 2025-02-05 RX ORDER — TEMAZEPAM 15 MG/1
15 CAPSULE ORAL NIGHTLY PRN
Qty: 30 CAPSULE | Refills: 2 | Status: SHIPPED | OUTPATIENT
Start: 2025-02-05 | End: 2025-02-05 | Stop reason: SDUPTHER

## 2025-02-11 DIAGNOSIS — M51.369 DDD (DEGENERATIVE DISC DISEASE), LUMBAR: ICD-10-CM

## 2025-02-11 RX ORDER — IBUPROFEN 800 MG/1
800 TABLET, FILM COATED ORAL EVERY 8 HOURS PRN
Qty: 90 TABLET | Refills: 2 | Status: SHIPPED | OUTPATIENT
Start: 2025-02-11

## 2025-02-19 DIAGNOSIS — M79.89 LEG SWELLING: ICD-10-CM

## 2025-02-19 RX ORDER — FUROSEMIDE 40 MG/1
40 TABLET ORAL 2 TIMES DAILY
Qty: 180 TABLET | Refills: 3 | Status: SHIPPED | OUTPATIENT
Start: 2025-02-19

## 2025-02-24 RX ORDER — KETOCONAZOLE 20 MG/G
CREAM TOPICAL
Qty: 30 G | Refills: 1 | Status: SHIPPED | OUTPATIENT
Start: 2025-02-24

## 2025-02-25 ENCOUNTER — APPOINTMENT (OUTPATIENT)
Dept: BEHAVIORAL HEALTH | Age: 70
End: 2025-02-25

## 2025-02-28 RX ORDER — ALENDRONATE SODIUM 10 MG/1
10 TABLET ORAL
Qty: 90 TABLET | Refills: 3 | Status: SHIPPED | OUTPATIENT
Start: 2025-02-28

## 2025-03-02 DIAGNOSIS — J43.1 PANLOBULAR EMPHYSEMA  (CMD): ICD-10-CM

## 2025-03-03 DIAGNOSIS — Z86.39 HISTORY OF HYPOKALEMIA: ICD-10-CM

## 2025-03-03 RX ORDER — ALBUTEROL SULFATE 90 UG/1
2 AEROSOL, METERED RESPIRATORY (INHALATION) EVERY 4 HOURS PRN
Qty: 18 G | Refills: 1 | Status: SHIPPED | OUTPATIENT
Start: 2025-03-03

## 2025-03-03 RX ORDER — POTASSIUM CHLORIDE 1500 MG/1
20 TABLET, EXTENDED RELEASE ORAL DAILY
Qty: 90 TABLET | Refills: 0 | Status: SHIPPED | OUTPATIENT
Start: 2025-03-03

## 2025-03-11 ENCOUNTER — APPOINTMENT (OUTPATIENT)
Dept: FAMILY MEDICINE | Age: 70
End: 2025-03-11

## 2025-03-11 VITALS
WEIGHT: 168.2 LBS | DIASTOLIC BLOOD PRESSURE: 71 MMHG | RESPIRATION RATE: 16 BRPM | HEART RATE: 65 BPM | BODY MASS INDEX: 28.02 KG/M2 | HEIGHT: 65 IN | SYSTOLIC BLOOD PRESSURE: 136 MMHG

## 2025-03-11 DIAGNOSIS — J40 BRONCHITIS: ICD-10-CM

## 2025-03-11 DIAGNOSIS — N18.32 STAGE 3B CHRONIC KIDNEY DISEASE  (CMD): ICD-10-CM

## 2025-03-11 DIAGNOSIS — E03.9 ACQUIRED HYPOTHYROIDISM: ICD-10-CM

## 2025-03-11 DIAGNOSIS — K21.9 GASTROESOPHAGEAL REFLUX DISEASE WITHOUT ESOPHAGITIS: ICD-10-CM

## 2025-03-11 DIAGNOSIS — F31.61 BIPOLAR DISORDER, CURRENT EPISODE MIXED, MILD  (CMD): ICD-10-CM

## 2025-03-11 DIAGNOSIS — J43.1 PANLOBULAR EMPHYSEMA  (CMD): ICD-10-CM

## 2025-03-11 DIAGNOSIS — I10 PRIMARY HYPERTENSION: Primary | ICD-10-CM

## 2025-03-11 DIAGNOSIS — I50.32 CHRONIC DIASTOLIC HEART FAILURE  (CMD): ICD-10-CM

## 2025-03-11 PROCEDURE — 96127 BRIEF EMOTIONAL/BEHAV ASSMT: CPT | Performed by: FAMILY MEDICINE

## 2025-03-11 PROCEDURE — G2211 COMPLEX E/M VISIT ADD ON: HCPCS | Performed by: FAMILY MEDICINE

## 2025-03-11 PROCEDURE — 99214 OFFICE O/P EST MOD 30 MIN: CPT | Performed by: FAMILY MEDICINE

## 2025-03-11 RX ORDER — QUETIAPINE FUMARATE 25 MG/1
25 TABLET, FILM COATED ORAL NIGHTLY
COMMUNITY
Start: 2025-02-20

## 2025-03-11 RX ORDER — CALCIUM CARBONATE/VITAMIN D3 600 MG-10
1 TABLET ORAL DAILY
COMMUNITY
Start: 2025-01-07

## 2025-03-11 RX ORDER — GUAIFENESIN 600 MG/1
1200 TABLET, EXTENDED RELEASE ORAL 2 TIMES DAILY
Qty: 120 TABLET | Refills: 5 | Status: SHIPPED | OUTPATIENT
Start: 2025-03-11

## 2025-03-24 RX ORDER — KETOCONAZOLE 20 MG/G
CREAM TOPICAL
Qty: 30 G | Refills: 1 | Status: SHIPPED | OUTPATIENT
Start: 2025-03-24

## 2025-03-31 ENCOUNTER — TELEPHONE (OUTPATIENT)
Dept: FAMILY MEDICINE | Age: 70
End: 2025-03-31

## 2025-04-16 ENCOUNTER — TELEPHONE (OUTPATIENT)
Dept: BEHAVIORAL HEALTH | Age: 70
End: 2025-04-16

## 2025-04-20 DIAGNOSIS — S76.911A MUSCLE STRAIN OF RIGHT THIGH, INITIAL ENCOUNTER: ICD-10-CM

## 2025-04-23 DIAGNOSIS — I10 PRIMARY HYPERTENSION: Primary | ICD-10-CM

## 2025-04-23 RX ORDER — LISINOPRIL 20 MG/1
20 TABLET ORAL DAILY
Qty: 90 TABLET | Refills: 3 | Status: SHIPPED | OUTPATIENT
Start: 2025-04-23

## 2025-04-24 RX ORDER — KETOCONAZOLE 20 MG/G
1 CREAM TOPICAL 2 TIMES DAILY
Qty: 30 G | Refills: 1 | Status: SHIPPED | OUTPATIENT
Start: 2025-04-24

## 2025-05-19 DIAGNOSIS — J43.1 PANLOBULAR EMPHYSEMA  (CMD): ICD-10-CM

## 2025-05-20 RX ORDER — ALBUTEROL SULFATE 90 UG/1
2 AEROSOL, METERED RESPIRATORY (INHALATION) EVERY 4 HOURS PRN
Qty: 18 G | Refills: 1 | Status: SHIPPED | OUTPATIENT
Start: 2025-05-20

## 2025-05-24 DIAGNOSIS — F31.61 BIPOLAR DISORDER, CURRENT EPISODE MIXED, MILD  (CMD): ICD-10-CM

## 2025-05-27 RX ORDER — KETOCONAZOLE 20 MG/G
1 CREAM TOPICAL 2 TIMES DAILY
Qty: 30 G | Refills: 1 | Status: SHIPPED | OUTPATIENT
Start: 2025-05-27

## 2025-05-27 RX ORDER — BUPROPION HYDROCHLORIDE 150 MG/1
450 TABLET ORAL DAILY
Qty: 270 TABLET | Refills: 3 | Status: SHIPPED | OUTPATIENT
Start: 2025-05-27

## 2025-06-02 DIAGNOSIS — Z86.39 HISTORY OF HYPOKALEMIA: ICD-10-CM

## 2025-06-03 ENCOUNTER — TELEPHONE (OUTPATIENT)
Dept: PAIN MANAGEMENT | Age: 70
End: 2025-06-03

## 2025-06-03 DIAGNOSIS — M48.062 LUMBAR STENOSIS WITH NEUROGENIC CLAUDICATION: ICD-10-CM

## 2025-06-03 DIAGNOSIS — M51.362 DEGENERATION OF INTERVERTEBRAL DISC OF LUMBAR REGION WITH DISCOGENIC BACK PAIN AND LOWER EXTREMITY PAIN: ICD-10-CM

## 2025-06-03 DIAGNOSIS — M54.16 LUMBAR RADICULOPATHY: Primary | ICD-10-CM

## 2025-06-03 RX ORDER — POTASSIUM CHLORIDE 1500 MG/1
20 TABLET, EXTENDED RELEASE ORAL DAILY
Qty: 90 TABLET | Refills: 0 | Status: SHIPPED | OUTPATIENT
Start: 2025-06-03

## 2025-06-16 DIAGNOSIS — M51.369 DDD (DEGENERATIVE DISC DISEASE), LUMBAR: ICD-10-CM

## 2025-06-16 RX ORDER — IBUPROFEN 800 MG/1
800 TABLET, FILM COATED ORAL EVERY 8 HOURS PRN
Qty: 90 TABLET | Refills: 2 | Status: SHIPPED | OUTPATIENT
Start: 2025-06-16

## 2025-06-23 ENCOUNTER — HOSPITAL ENCOUNTER (OUTPATIENT)
Dept: GENERAL RADIOLOGY | Age: 70
Discharge: HOME | End: 2025-06-23
Attending: PAIN MEDICINE

## 2025-06-23 ENCOUNTER — HOSPITAL ENCOUNTER (OUTPATIENT)
Dept: PAIN MANAGEMENT | Age: 70
Discharge: HOME | End: 2025-06-23
Attending: PAIN MEDICINE

## 2025-06-23 VITALS
TEMPERATURE: 96.7 F | DIASTOLIC BLOOD PRESSURE: 62 MMHG | SYSTOLIC BLOOD PRESSURE: 135 MMHG | RESPIRATION RATE: 16 BRPM | HEART RATE: 71 BPM | OXYGEN SATURATION: 99 %

## 2025-06-23 DIAGNOSIS — M51.362 DEGENERATION OF INTERVERTEBRAL DISC OF LUMBAR REGION WITH DISCOGENIC BACK PAIN AND LOWER EXTREMITY PAIN: Primary | ICD-10-CM

## 2025-06-23 DIAGNOSIS — M54.16 LUMBAR RADICULOPATHY: ICD-10-CM

## 2025-06-23 DIAGNOSIS — M48.062 LUMBAR STENOSIS WITH NEUROGENIC CLAUDICATION: ICD-10-CM

## 2025-06-23 PROCEDURE — 10005267 HB INJECT FORAMEN INITIAL BILATERAL

## 2025-06-23 PROCEDURE — 10002805 HB CONTRAST AGENT: Performed by: PAIN MEDICINE

## 2025-06-23 PROCEDURE — 10004187 HB COUNTER-VISIT CENSUS, OUTPATIENT

## 2025-06-23 PROCEDURE — 10002800 HB RX 250 W HCPCS: Performed by: PAIN MEDICINE

## 2025-06-23 PROCEDURE — 10005281 FL INTRAOPERATIVE C ARM NO REPORT

## 2025-06-23 RX ORDER — KETOCONAZOLE 20 MG/G
1 CREAM TOPICAL 2 TIMES DAILY
Qty: 30 G | Refills: 1 | Status: SHIPPED | OUTPATIENT
Start: 2025-06-23

## 2025-06-23 RX ORDER — BETAMETHASONE SODIUM PHOSPHATE AND BETAMETHASONE ACETATE 3; 3 MG/ML; MG/ML
12 INJECTION, SUSPENSION INTRA-ARTICULAR; INTRALESIONAL; INTRAMUSCULAR; SOFT TISSUE ONCE
Status: COMPLETED
Start: 2025-06-23 | End: 2025-06-23

## 2025-06-23 RX ORDER — DEXAMETHASONE SODIUM PHOSPHATE 10 MG/ML
20 INJECTION, SOLUTION INTRAMUSCULAR; INTRAVENOUS ONCE
Status: DISCONTINUED | OUTPATIENT
Start: 2025-06-23 | End: 2025-06-25 | Stop reason: HOSPADM

## 2025-06-23 RX ADMIN — BETAMETHASONE SODIUM PHOSPHATE AND BETAMETHASONE ACETATE 12 MG: 3; 3 INJECTION, SUSPENSION INTRA-ARTICULAR; INTRALESIONAL; INTRAMUSCULAR at 14:07

## 2025-06-23 RX ADMIN — IOHEXOL 3 ML: 180 INJECTION INTRAVENOUS at 14:07

## 2025-06-23 ASSESSMENT — PAIN SCALES - GENERAL
PAINLEVEL_OUTOF10: 0
PAINLEVEL_OUTOF10: 7

## 2025-07-07 DIAGNOSIS — G47.10 HYPERSOMNOLENCE: ICD-10-CM

## 2025-07-08 RX ORDER — MODAFINIL 200 MG/1
200 TABLET ORAL 2 TIMES DAILY
Qty: 180 TABLET | Refills: 3 | Status: SHIPPED | OUTPATIENT
Start: 2025-07-08 | End: 2025-07-11 | Stop reason: SDUPTHER

## 2025-07-08 RX ORDER — CALCIUM CARBONATE/VITAMIN D3 600 MG-10
1 TABLET ORAL DAILY
Qty: 90 TABLET | Refills: 0 | Status: SHIPPED | OUTPATIENT
Start: 2025-07-08

## 2025-07-11 ENCOUNTER — TELEPHONE (OUTPATIENT)
Dept: BEHAVIORAL HEALTH | Age: 70
End: 2025-07-11

## 2025-07-11 DIAGNOSIS — G47.10 HYPERSOMNOLENCE: ICD-10-CM

## 2025-07-11 RX ORDER — MODAFINIL 200 MG/1
200 TABLET ORAL 2 TIMES DAILY
Qty: 180 TABLET | Refills: 3 | Status: SHIPPED | OUTPATIENT
Start: 2025-07-11

## 2025-07-15 ENCOUNTER — APPOINTMENT (OUTPATIENT)
Dept: BEHAVIORAL HEALTH | Age: 70
End: 2025-07-15

## 2025-07-21 RX ORDER — KETOCONAZOLE 20 MG/G
1 CREAM TOPICAL 2 TIMES DAILY
Qty: 30 G | Refills: 1 | Status: SHIPPED | OUTPATIENT
Start: 2025-07-21

## 2025-07-28 ENCOUNTER — APPOINTMENT (OUTPATIENT)
Dept: PAIN MANAGEMENT | Age: 70
End: 2025-07-28
Attending: PAIN MEDICINE

## 2025-08-11 ENCOUNTER — HOSPITAL ENCOUNTER (OUTPATIENT)
Dept: PAIN MANAGEMENT | Age: 70
End: 2025-08-11
Attending: PAIN MEDICINE

## 2025-08-11 ENCOUNTER — APPOINTMENT (OUTPATIENT)
Dept: BEHAVIORAL HEALTH | Age: 70
End: 2025-08-11

## 2025-08-19 DIAGNOSIS — F41.1 GENERALIZED ANXIETY DISORDER: ICD-10-CM

## 2025-08-19 DIAGNOSIS — F31.12 BIPOLAR AFFECTIVE DISORDER, CURRENTLY MANIC, MODERATE  (CMD): ICD-10-CM

## 2025-08-19 RX ORDER — DULOXETIN HYDROCHLORIDE 60 MG/1
60 CAPSULE, DELAYED RELEASE ORAL 2 TIMES DAILY
Qty: 180 CAPSULE | Refills: 3 | Status: SHIPPED | OUTPATIENT
Start: 2025-08-19

## 2025-08-20 ENCOUNTER — APPOINTMENT (OUTPATIENT)
Dept: BEHAVIORAL HEALTH | Age: 70
End: 2025-08-20

## 2025-08-20 DIAGNOSIS — F51.01 PRIMARY INSOMNIA: ICD-10-CM

## 2025-08-20 DIAGNOSIS — F41.1 GENERALIZED ANXIETY DISORDER: ICD-10-CM

## 2025-08-20 DIAGNOSIS — F31.61 BIPOLAR DISORDER, CURRENT EPISODE MIXED, MILD  (CMD): Primary | ICD-10-CM

## 2025-08-20 PROCEDURE — 99214 OFFICE O/P EST MOD 30 MIN: CPT | Performed by: NURSE PRACTITIONER

## 2025-08-20 RX ORDER — LAMOTRIGINE 25 MG/1
50 TABLET ORAL DAILY
Qty: 180 TABLET | Refills: 1 | Status: SHIPPED | OUTPATIENT
Start: 2025-08-20

## 2025-08-20 RX ORDER — TEMAZEPAM 7.5 MG/1
7.5 CAPSULE ORAL NIGHTLY PRN
Qty: 30 CAPSULE | Refills: 1 | Status: SHIPPED | OUTPATIENT
Start: 2025-08-20

## 2025-08-26 ENCOUNTER — APPOINTMENT (OUTPATIENT)
Dept: PAIN MANAGEMENT | Age: 70
End: 2025-08-26
Attending: PAIN MEDICINE

## 2025-09-16 ENCOUNTER — APPOINTMENT (OUTPATIENT)
Dept: FAMILY MEDICINE | Age: 70
End: 2025-09-16

## 2026-01-28 ENCOUNTER — APPOINTMENT (OUTPATIENT)
Dept: BEHAVIORAL HEALTH | Age: 71
End: 2026-01-28

## (undated) DEVICE — MMIS - TOGA SURG FLYTE SURGICOOL XL PEEL-AWAY ZP

## (undated) DEVICE — MMIS - DRAPE SURG 120X77IN REINFORCE SPLIT ABS TUBE HLDR

## (undated) DEVICE — MMIS - DRAPE SURG 77X56IN 42X25IN REINF FAN FOLD CNVRT

## (undated) DEVICE — MMIS - COVER HVDTY REINF BACK DISP 110X80IN 110X37IN TBL

## (undated) DEVICE — MMIS - SPONGE LAP 18X18IN CTN XRAY DTCT HI ABS PREWASH

## (undated) DEVICE — MMIS - DEVICE TRCLSN PDS STRATAFIX SYMMETRIC PDS + 1 CTX

## (undated) DEVICE — MMIS - KIT PLS LAV FAN SPRAY TIP RTN MECH RCKR SWH TXTR

## (undated) DEVICE — MMIS - GLOVE SURG 7.5 PROTEXIS LF CRM PF BEAD CUFF STRL

## (undated) DEVICE — MMIS - SOLUTION URMTC 0.9% NACL 3L LF IRR

## (undated) DEVICE — MMIS - SEALER BP .226IN .137IN AQUAMANTYS 6

## (undated) DEVICE — MMIS - SUTURE VCL+ 2-0 CT1 27IN TRCLSN PLGC 910 BRAID

## (undated) DEVICE — MMIS - GLOVE SURG 8 PROTEXIS LF BLUE PF SMTH BEAD CUFF

## (undated) DEVICE — MMIS - KIT PSTN 9IN PT CARE SUPN HDRST DONUT PADDED PERI

## (undated) DEVICE — MMIS - SUTURE VCL+ 1 CT1 36IN BRAID COAT ABS UNDYED

## (undated) DEVICE — MMIS - DRESSING WND 8X2IN SLVRLN PAD ANTIMICROBIAL STRL

## (undated) DEVICE — MMIS - GLOVE SURG 6.5 PROTEXIS LF CRM PF BEAD CUFF STRL

## (undated) DEVICE — Device

## (undated) DEVICE — MMIS - GLOVE SURG 8.5 PROTEXIS LF CRM PF BEAD CUFF STRL

## (undated) DEVICE — MMIS - DEVICE PSTN UNV DEVON CONVOLUTE PRTC MOLD NTOX ULN

## (undated) DEVICE — MMIS - BLADE SAW 68MMX12MM 2 SIDE RECIPROCATE STRL THK.8

## (undated) DEVICE — MMIS - BLANKET WRM UPR BODY 78.7X29.9IN MISTRAL-AIR FORCE

## (undated) DEVICE — MMIS - SYSTEM WND IRR DBRD CLNSG IRRISEPT 0.05% CHG STRL

## (undated) DEVICE — MMIS - DRESSING ADH 10X4IN KENDALL TELFA POLY CTN ISLAND

## (undated) DEVICE — MMIS - SUTURE ETHIBOND EXCEL 5 V40 30IN 4 STRN BRAID

## (undated) DEVICE — MMIS - SOLUTION AQLT 0.9% NACL 1000ML LF IRR

## (undated) DEVICE — MMIS - HANA POSITIONING KIT

## (undated) DEVICE — MMIS - GLOVE SURG 6.5 PROTEXIS LF LIGHT BRN PF BEAD CUFF

## (undated) DEVICE — MMIS - COVER STAND 54X23IN MAYO REG FABRIC REINFORCE STD

## (undated) DEVICE — MMIS - DEVICE TRCLSN PLGCPRN 25 STRATAFIX SPRL MONOCRYL +

## (undated) DEVICE — MMIS - CONSTRUCT HIP TOTAL CONVENT - JOHNSON & JOHNSON

## (undated) DEVICE — MMIS - TOGA SURG FLYTE SURGICOOL 2XL ZP PEEL AWAY

## (undated) DEVICE — MMIS - ADHESIVE DRMBND PRINEO 22CM 3.8ML LIQUID MESH APL

## (undated) DEVICE — MMIS - GLOVE SURG 7 PROTEXIS LF BLUE PF SMTH BEAD CUFF

## (undated) DEVICE — MMIS - ADHESIVE SKIN TOPICAL LIQUIBAND EXCEED 0.8ML

## (undated) DEVICE — MMIS - DRESSING TRANS TEGADERM 8X6IN ADH HPOAL WTPRF BSC

## (undated) DEVICE — MMIS - BLADE SAW 90X13MM THK1.27MM OSCILLATE ACRION DEPUY

## (undated) DEVICE — MMIS - SUTURE VCL+ 0 CT1 36IN BRAID COAT ABS UNDYED

## (undated) DEVICE — MMIS - KIT SURG ORIF HIP FX

## (undated) DEVICE — MMIS - GLOVE SURG 8 PROTEXIS LF CRM PF BEAD CUFF STRL

## (undated) DEVICE — MMIS - CONSTRUCT HIP TOTAL ADVANCED (MET POLY)

## (undated) DEVICE — MMIS - DRESSING WND TELFA 8X3IN ABS PAD NADH STRL LF

## (undated) DEVICE — MMIS - SEALER BP .226IN .092IN AQUAMANTYS 2.3

## (undated) DEVICE — MMIS - COVER FLXB RGD CNVRT LIGHT HNDL PLASTIC DISP GRN

## (undated) DEVICE — MMIS - BIT DRILL 3.2MM 145MM QC NS

## (undated) DEVICE — MMIS - ADHESIVE DRMBND .7ML LIQUID APL MCBL BRR FLXB

## (undated) DEVICE — MMIS - TUBE SCT MINI KAMVAC STRL LF DISP

## (undated) DEVICE — MMIS - SOLUTION 100ML 1 PK VIAFLEX MINI BAG INJ 0.9% NACL

## (undated) DEVICE — MMIS - SCREW BN 4.5MM 8MM 38MM 3.5MM SELF TAP LG HEX SCKT
Type: IMPLANTABLE DEVICE | Site: HIP | Status: NON-FUNCTIONAL
Removed: 2021-02-24

## (undated) DEVICE — MMIS - GLOVE SURG 7.5 PROTEXIS LF CRM PF SMTH BEAD CUFF

## (undated) DEVICE — MMIS - GLOVE SURG 8 GAMMEX LF NATURAL PF TXTR BEAD CUFF

## (undated) DEVICE — MMIS - SHEET PATRAN TRNSF

## (undated) DEVICE — MMIS - PACK SETUP STRL

## (undated) DEVICE — PINNACLE HIP SOLUTIONS ALTRX POLYETHYLENE ACETABULAR LINER +4 10 DEGREE 36MM ID 54MM OD
Type: IMPLANTABLE DEVICE | Site: HIP | Status: NON-FUNCTIONAL
Brand: PINNACLE ALTRX
Removed: 2021-02-24